# Patient Record
Sex: MALE | Race: WHITE | Employment: OTHER | URBAN - METROPOLITAN AREA
[De-identification: names, ages, dates, MRNs, and addresses within clinical notes are randomized per-mention and may not be internally consistent; named-entity substitution may affect disease eponyms.]

---

## 2017-05-16 PROBLEM — G93.41 ACUTE METABOLIC ENCEPHALOPATHY: Status: ACTIVE | Noted: 2017-05-16

## 2017-08-18 ENCOUNTER — HOSPITAL ENCOUNTER (OUTPATIENT)
Dept: ULTRASOUND IMAGING | Age: 77
Discharge: HOME | End: 2017-08-18

## 2017-08-18 DIAGNOSIS — N18.6 TYPE 2 DIABETES MELLITUS WITH ESRD (END-STAGE RENAL DISEASE) (HCC): ICD-10-CM

## 2017-08-18 DIAGNOSIS — E11.22 TYPE 2 DIABETES MELLITUS WITH ESRD (END-STAGE RENAL DISEASE) (HCC): ICD-10-CM

## 2017-08-18 DIAGNOSIS — D64.9 ANEMIA, UNSPECIFIED: ICD-10-CM

## 2017-08-18 DIAGNOSIS — N18.30 CHRONIC KIDNEY DISEASE, STAGE III (MODERATE) (HCC): ICD-10-CM

## 2017-08-18 DIAGNOSIS — I10 UNSPECIFIED ESSENTIAL HYPERTENSION: ICD-10-CM

## 2017-08-18 LAB
ALBUMIN SERPL-MCNC: 4.5 GM/DL (ref 3.4–5)
ANION GAP SERPL CALCULATED.3IONS-SCNC: 13 MMOL/L (ref 4–16)
BACTERIA: NEGATIVE /HPF
BASOPHILS ABSOLUTE: 0.1 K/CU MM
BASOPHILS RELATIVE PERCENT: 0.9 % (ref 0–1)
BILIRUBIN URINE: NEGATIVE MG/DL
BLOOD, URINE: ABNORMAL
BUN BLDV-MCNC: 30 MG/DL (ref 6–23)
CALCIUM SERPL-MCNC: 8.8 MG/DL (ref 8.3–10.6)
CHLORIDE BLD-SCNC: 103 MMOL/L (ref 99–110)
CLARITY: CLEAR
CO2: 25 MMOL/L (ref 21–32)
COLOR: ABNORMAL
CREAT SERPL-MCNC: 1.8 MG/DL (ref 0.9–1.3)
CREATININE URINE: 96.5 MG/DL (ref 39–259)
DIFFERENTIAL TYPE: ABNORMAL
EOSINOPHILS ABSOLUTE: 0.3 K/CU MM
EOSINOPHILS RELATIVE PERCENT: 3.9 % (ref 0–3)
GFR AFRICAN AMERICAN: 45 ML/MIN/1.73M2
GFR NON-AFRICAN AMERICAN: 37 ML/MIN/1.73M2
GLUCOSE BLD-MCNC: 108 MG/DL (ref 70–140)
GLUCOSE, URINE: NEGATIVE MG/DL
HCT VFR BLD CALC: 28.3 % (ref 42–52)
HEMOGLOBIN: 9.1 GM/DL (ref 13.5–18)
IMMATURE NEUTROPHIL %: 0.4 % (ref 0–0.43)
KETONES, URINE: NEGATIVE MG/DL
LEUKOCYTE ESTERASE, URINE: NEGATIVE
LYMPHOCYTES ABSOLUTE: 2.3 K/CU MM
LYMPHOCYTES RELATIVE PERCENT: 29.6 % (ref 24–44)
MCH RBC QN AUTO: 29.1 PG (ref 27–31)
MCHC RBC AUTO-ENTMCNC: 32.2 % (ref 32–36)
MCV RBC AUTO: 90.4 FL (ref 78–100)
MONOCYTES ABSOLUTE: 0.5 K/CU MM
MONOCYTES RELATIVE PERCENT: 6.8 % (ref 0–4)
MUCUS: ABNORMAL HPF
NITRITE URINE, QUANTITATIVE: NEGATIVE
NUCLEATED RBC %: 0 %
PDW BLD-RTO: 13.8 % (ref 11.7–14.9)
PH, URINE: 5 (ref 5–8)
PHOSPHORUS: 3.1 MG/DL (ref 2.5–4.9)
PLATELET # BLD: 209 K/CU MM (ref 140–440)
PMV BLD AUTO: 11.3 FL (ref 7.5–11.1)
POTASSIUM SERPL-SCNC: 5.1 MMOL/L (ref 3.5–5.1)
PROT/CREAT RATIO, UR: 0.2
PROTEIN UA: NEGATIVE MG/DL
RBC # BLD: 3.13 M/CU MM (ref 4.6–6.2)
RBC URINE: 1 /HPF (ref 0–3)
SEGMENTED NEUTROPHILS ABSOLUTE COUNT: 4.5 K/CU MM
SEGMENTED NEUTROPHILS RELATIVE PERCENT: 58.4 % (ref 36–66)
SODIUM BLD-SCNC: 141 MMOL/L (ref 135–145)
SPECIFIC GRAVITY UA: 1.01 (ref 1–1.03)
TOTAL IMMATURE NEUTOROPHIL: 0.03 K/CU MM
TOTAL NUCLEATED RBC: 0 K/CU MM
TRICHOMONAS: ABNORMAL /HPF
URINE TOTAL PROTEIN: 16.5 MG/DL
UROBILINOGEN, URINE: NORMAL MG/DL (ref 0.2–1)
VITAMIN D 25-HYDROXY: 14.51 NG/ML
WBC # BLD: 7.7 K/CU MM (ref 4–10.5)
WBC UA: <1 /HPF (ref 0–2)

## 2017-08-19 LAB — PARATHYROID HORMONE INTACT: 51

## 2017-11-25 PROBLEM — N17.9 AKI (ACUTE KIDNEY INJURY) (HCC): Status: ACTIVE | Noted: 2017-11-25

## 2018-04-05 ENCOUNTER — HOSPITAL ENCOUNTER (OUTPATIENT)
Dept: OTHER | Age: 78
Discharge: HOME | End: 2018-04-05
Attending: INTERNAL MEDICINE | Admitting: INTERNAL MEDICINE

## 2018-04-05 LAB
ALBUMIN SERPL-MCNC: 4.1 GM/DL (ref 3.4–5)
ALP BLD-CCNC: 80 IU/L (ref 40–129)
ALT SERPL-CCNC: 12 U/L (ref 10–40)
ANION GAP SERPL CALCULATED.3IONS-SCNC: 8 MMOL/L (ref 4–16)
APTT: 35.5 SECONDS (ref 21.2–33)
AST SERPL-CCNC: 17 IU/L (ref 15–37)
BILIRUB SERPL-MCNC: 0.3 MG/DL (ref 0–1)
BUN BLDV-MCNC: 28 MG/DL (ref 6–23)
CALCIUM SERPL-MCNC: 8.7 MG/DL (ref 8.3–10.6)
CHLORIDE BLD-SCNC: 102 MMOL/L (ref 99–110)
CO2: 29 MMOL/L (ref 21–32)
CREAT SERPL-MCNC: 1.9 MG/DL (ref 0.9–1.3)
ERYTHROCYTE SEDIMENTATION RATE: 41 MM/HR (ref 0–20)
FERRITIN: 66 NG/ML (ref 30–400)
FOLATE: >20 NG/ML (ref 3.1–17.5)
GFR AFRICAN AMERICAN: 42 ML/MIN/1.73M2
GFR NON-AFRICAN AMERICAN: 35 ML/MIN/1.73M2
GLUCOSE BLD-MCNC: 189 MG/DL (ref 70–99)
INR BLD: 1.07 INDEX
LACTATE DEHYDROGENASE: 207 IU/L (ref 120–246)
POTASSIUM SERPL-SCNC: 5.9 MMOL/L (ref 3.5–5.1)
PROTHROMBIN TIME: 12.2 SECONDS (ref 9.12–12.5)
RETICULOCYTE COUNT PCT: 1.5 % (ref 0.2–2.2)
SODIUM BLD-SCNC: 139 MMOL/L (ref 135–145)
TOTAL PROTEIN: 6.9 GM/DL (ref 6.4–8.2)
TSH HIGH SENSITIVITY: 0.19 UIU/ML (ref 0.27–4.2)
VITAMIN B-12: 501.1 PG/ML (ref 211–911)

## 2018-04-06 LAB
ALBUMIN ELP: 3.7 GM/DL (ref 3.2–5.6)
ALPHA-1-GLOBULIN: 0.3 GM/DL (ref 0.1–0.4)
ALPHA-2-GLOBULIN: 0.8 GM/DL (ref 0.4–1.2)
BETA GLOBULIN: 0.9 GM/DL (ref 0.5–1.3)
GAMMA GLOBULIN: 1.2 GM/DL (ref 0.5–1.6)
HAPTOGLOBIN: 194
TOTAL PROTEIN: 6.9 GM/DL (ref 6.4–8.2)

## 2018-06-01 PROBLEM — G93.40 ENCEPHALOPATHY ACUTE: Status: ACTIVE | Noted: 2018-06-01

## 2019-09-13 ENCOUNTER — OFFICE VISIT (OUTPATIENT)
Dept: ORTHOPEDIC SURGERY | Age: 79
End: 2019-09-13
Payer: MEDICARE

## 2019-09-13 VITALS — WEIGHT: 184 LBS | RESPIRATION RATE: 16 BRPM | HEIGHT: 67 IN | BODY MASS INDEX: 28.88 KG/M2

## 2019-09-13 DIAGNOSIS — S62.320A DISPLACED FRACTURE OF SHAFT OF SECOND METACARPAL BONE, RIGHT HAND, INITIAL ENCOUNTER FOR CLOSED FRACTURE: Primary | ICD-10-CM

## 2019-09-13 PROCEDURE — 1123F ACP DISCUSS/DSCN MKR DOCD: CPT | Performed by: PHYSICIAN ASSISTANT

## 2019-09-13 PROCEDURE — 4040F PNEUMOC VAC/ADMIN/RCVD: CPT | Performed by: PHYSICIAN ASSISTANT

## 2019-09-13 PROCEDURE — 26600 TREAT METACARPAL FRACTURE: CPT | Performed by: PHYSICIAN ASSISTANT

## 2019-09-13 PROCEDURE — 1036F TOBACCO NON-USER: CPT | Performed by: PHYSICIAN ASSISTANT

## 2019-09-13 PROCEDURE — G8427 DOCREV CUR MEDS BY ELIG CLIN: HCPCS | Performed by: PHYSICIAN ASSISTANT

## 2019-09-13 PROCEDURE — 99202 OFFICE O/P NEW SF 15 MIN: CPT | Performed by: PHYSICIAN ASSISTANT

## 2019-09-13 PROCEDURE — G8419 CALC BMI OUT NRM PARAM NOF/U: HCPCS | Performed by: PHYSICIAN ASSISTANT

## 2019-09-13 RX ORDER — TRAZODONE HYDROCHLORIDE 50 MG/1
50 TABLET ORAL
COMMUNITY
Start: 2019-07-10 | End: 2020-07-09

## 2019-09-13 ASSESSMENT — ENCOUNTER SYMPTOMS
RESPIRATORY NEGATIVE: 1
GASTROINTESTINAL NEGATIVE: 1
EYES NEGATIVE: 1

## 2019-09-13 NOTE — PROGRESS NOTES
I reviewed and agree with the portions of the HPI, review of systems, vital documentation and plan performed by my staff and have added/addended where appropriate. Review of Systems   Constitutional: Negative. HENT: Negative. Eyes: Negative. Respiratory: Negative. Cardiovascular: Negative. Gastrointestinal: Negative. Genitourinary: Negative. Musculoskeletal: Positive for arthralgias, gait problem, joint swelling and myalgias. Skin: Negative. Neurological: Positive for speech difficulty. Psychiatric/Behavioral: Positive for behavioral problems, confusion and dysphoric mood. The patient is nervous/anxious. Michael Murphy is a 70y.o. year old male who complains of Right hand pain and swelling. Patient was sent here by [facility] for possible hand fracture. He is a poor historian but states he had a fall on 9/9/19. He rates his pain a 7/10. And states there is no known prior history. Patient has severe anxiety. He is right-hand dominant.         Past Medical History:   Diagnosis Date    Depression     Diabetes ([address]     Dystonia per pt    GERD (gastroesophageal reflux disease)     Hyperlipidemia     Hypertension     Hypothyroidism     Neuropathy     Non-smoker per pt       Past Surgical History:   Procedure Laterality Date    ANKLE SURGERY Left 06/25/2016    [facility] BACK SURGERY         Family History   Problem Relation Age of Onset    Diabetes Father     High Cholesterol Father     Heart Disease Maternal Grandmother     Heart Disease Maternal Grandfather     Stroke Maternal Grandfather     Heart Disease Paternal Grandmother     Diabetes Paternal Grandfather     Heart Disease Paternal Grandfather     High Blood Pressure Paternal Grandfather        Social History     Socioeconomic History    Marital status:      Spouse name: None    Number of children: None    Years of education: None    Highest education level: None Occupational History    None   Social Needs    Financial resource strain: None    Food insecurity:     Worry: None     Inability: None    Transportation needs:     Medical: None     Non-medical: None   Tobacco Use    Smoking status: Never Smoker    Smokeless tobacco: Never Used   Substance and Sexual Activity    Alcohol use: No     Alcohol/week: 0.0 standard drinks    Drug use: No    Sexual activity: None   Lifestyle    Physical activity:     Days per week: None     Minutes per session: None    Stress: None   Relationships    Social connections:     Talks on phone: None     Gets together: None     Attends [employer] service: None     Active member of club or organization: None     Attends meetings of clubs or organizations: None     Relationship status: None    Intimate partner violence:     Fear of current or ex partner: None     Emotionally abused: None     Physically abused: None     Forced sexual activity: None   Other Topics Concern    None   Social History Narrative    None       Current Outpatient Medications   Medication Sig Dispense Refill    traZODone (DESYREL) 50 MG tablet Take 50 mg by mouth      donepezil (ARICEPT) 10 MG tablet Take 10 mg by mouth nightly      gabapentin (NEURONTIN) 300 MG capsule Take 300 mg by mouth 2 times daily. Suzette Becker escitalopram (LEXAPRO) 20 MG tablet Take 20 mg by mouth daily      morphine (MS CONTIN) 15 MG extended release tablet Take 15 mg by mouth 2 times daily. Suzette Becker linagliptin (TRADJENTA) 5 MG tablet Take 1 tablet by mouth daily 30 tablet 0    QUEtiapine (SEROQUEL) 50 MG tablet Take 1 tablet by mouth nightly 60 tablet 3    loperamide (IMODIUM) 2 MG capsule Take 2 mg by mouth as needed for Diarrhea      aspirin 81 MG chewable tablet Take 1 tablet by mouth daily 30 tablet 0    busPIRone (BUSPAR) 5 MG tablet Take 1 tablet by mouth 2 times daily 30 tablet 0    gabapentin (NEURONTIN) 400 MG capsule Take 2 capsules by mouth 3 times daily (with meals)

## 2019-09-23 ENCOUNTER — OFFICE VISIT (OUTPATIENT)
Dept: ORTHOPEDIC SURGERY | Age: 79
End: 2019-09-23

## 2019-09-23 VITALS
BODY MASS INDEX: 28.91 KG/M2 | RESPIRATION RATE: 14 BRPM | WEIGHT: 184.2 LBS | HEART RATE: 78 BPM | OXYGEN SATURATION: 99 % | HEIGHT: 67 IN

## 2019-09-23 DIAGNOSIS — S62.320A DISPLACED FRACTURE OF SHAFT OF SECOND METACARPAL BONE, RIGHT HAND, INITIAL ENCOUNTER FOR CLOSED FRACTURE: Primary | ICD-10-CM

## 2019-09-23 PROCEDURE — 99024 POSTOP FOLLOW-UP VISIT: CPT | Performed by: PHYSICIAN ASSISTANT

## 2019-09-23 ASSESSMENT — ENCOUNTER SYMPTOMS
EYES NEGATIVE: 1
RESPIRATORY NEGATIVE: 1
GASTROINTESTINAL NEGATIVE: 1

## 2019-09-23 NOTE — PROGRESS NOTES
Hold Lantus Insulin if H. S.blood glucose < 150  and call Dr. Baez Doing 1 vial 0    levothyroxine (SYNTHROID) 100 MCG tablet Take 1 tablet by mouth Daily 30 tablet 0    memantine (NAMENDA) 5 MG tablet Take 1 tablet by mouth 2 times daily 60 tablet 0    melatonin 3 MG TABS tablet Take 1 tablet by mouth nightly as needed (sleep) 30 tablet 0    Multiple Vitamin (MULTI-VITAMINS PO) Take 2 tablets by mouth daily Pt takes 2 tablets of Orchard Blend fruit and 2 tablets of Orchard vegetable a day      omeprazole (PRILOSEC) 20 MG capsule Take 20 mg by mouth daily      albuterol sulfate  (90 BASE) MCG/ACT inhaler Inhale 2 puffs into the lungs every 6 hours as needed for Wheezing      insulin lispro (HUMALOG) 100 UNIT/ML injection vial Inject 0-12 Units into the skin 3 times daily (with meals) 1 vial 3    cetirizine (ZYRTEC) 10 MG tablet Take 1 tablet by mouth daily       No current facility-administered medications for this visit. Allergies   Allergen Reactions    Amitriptyline Other (See Comments)     sedation  Other reaction(s): sedation  sedation    Codeine      States have used this with no issues  Other reaction(s): \"crazy\"  States have used this with no issues    Suprax [Cefixime] Other (See Comments)     Pain       Review of Systems:  See above      Physical Exam:   Ht 5' 6.75\" (1.695 m)   BMI 29.03 kg/m²        Gait is Abnormal.   Gen/Psych:Examination reveals a pleasant individual in no acute distress. The patient is oriented to time, place and person. The patient's mood and affect are appropriate.     Lymph: The lymphatic examination bilaterally reveals all areas to be without enlargement or induration.      Skin intact without lymphadenopathy, discoloration, or abnormal temperature.      Vascular: There is intact, symmetric circulation in both upper extremities.       Right Arm exam:  Sensation is subjectively and objectively {gen normal-decreased-absent ed:[id number]} in the

## 2019-09-30 ENCOUNTER — OFFICE VISIT (OUTPATIENT)
Dept: ORTHOPEDIC SURGERY | Age: 79
End: 2019-09-30

## 2019-09-30 VITALS — OXYGEN SATURATION: 98 % | TEMPERATURE: 98 F | HEART RATE: 80 BPM

## 2019-09-30 DIAGNOSIS — S62.320A DISPLACED FRACTURE OF SHAFT OF SECOND METACARPAL BONE, RIGHT HAND, INITIAL ENCOUNTER FOR CLOSED FRACTURE: Primary | ICD-10-CM

## 2019-09-30 PROCEDURE — 99024 POSTOP FOLLOW-UP VISIT: CPT | Performed by: ORTHOPAEDIC SURGERY

## 2019-09-30 RX ORDER — ACETAMINOPHEN 500 MG
500 TABLET ORAL 4 TIMES DAILY PRN
Qty: 60 TABLET | Refills: 0 | Status: ON HOLD | OUTPATIENT
Start: 2019-09-30 | End: 2020-05-11 | Stop reason: HOSPADM

## 2019-09-30 RX ORDER — HYDROCODONE BITARTRATE AND ACETAMINOPHEN 5; 325 MG/1; MG/1
1 TABLET ORAL EVERY 6 HOURS PRN
Qty: 15 TABLET | Refills: 0 | Status: SHIPPED | OUTPATIENT
Start: 2019-09-30 | End: 2019-10-07

## 2019-09-30 RX ORDER — CLINDAMYCIN HYDROCHLORIDE 150 MG/1
150 CAPSULE ORAL 4 TIMES DAILY
Qty: 4 CAPSULE | Refills: 0 | Status: SHIPPED | OUTPATIENT
Start: 2019-09-30 | End: 2019-10-01

## 2019-09-30 ASSESSMENT — ENCOUNTER SYMPTOMS
SHORTNESS OF BREATH: 0
COLOR CHANGE: 0

## 2019-09-30 NOTE — PROGRESS NOTES
Subjective:      Patient ID: Pacheco Headley is a 70 y.o. male. Patient here today to discuss possible surgery on R hand, referred by SORAIDA Paz for consult. Right hand cast is cutting into skin and patient would like that to be removed. Patient states due to the Right hand injury the L hand is now swelling and pains him to use.      Luis Huang MA
tenderness. He exhibits no deformity. Right elbow: Normal.       Left elbow: Normal.        Right wrist: He exhibits normal range of motion, no tenderness, no bony tenderness, no swelling, no effusion, no crepitus, no deformity and no laceration. Left wrist: He exhibits normal range of motion, no tenderness, no bony tenderness, no swelling, no effusion, no crepitus, no deformity and no laceration. Right hand: He exhibits decreased range of motion, tenderness, bony tenderness and swelling. He exhibits normal capillary refill, no deformity and no laceration. Normal sensation noted. Decreased strength noted. Left hand: He exhibits tenderness and swelling. He exhibits normal range of motion and no bony tenderness. Normal sensation noted. Normal strength noted. Neurological: He is alert and oriented to person, place, and time. Skin: Skin is warm and dry. Capillary refill takes less than 2 seconds. No rash noted. No erythema. No pallor. Right hand-Skin intact with no erythema, ecchymosis or lacerations present. moderate tenderness to palpation over the second metacarpal  Capillary refill less than 2 seconds to all fingers. Compartments soft. Range of motion not assessed due to recent injury. XRAY  X-ray 3 views of the right hand from September 23, 2019 reviewed by me today in the office demonstrates age appropriate bone density throughout with a long oblique fracture through the second metacarpal shaft with mild shortening and moderate dorsal displacement of the distal  fracture fragment, no subluxation or dislocation noted.     Xr Hand Left (min 3 Views)    Result Date: 9/30/2019  XRAY X-ray 3 views of the left hand obtained and reviewed by me today in the office demonstrates age appropriate bone density throughout with normal joint spaces and normal overall alignment, no subluxation or dislocation noted, mild degenerative changes, no acute osseous abnormalities, no bony

## 2019-10-01 ENCOUNTER — TELEPHONE (OUTPATIENT)
Dept: ORTHOPEDIC SURGERY | Age: 79
End: 2019-10-01

## 2019-10-02 ENCOUNTER — ANESTHESIA EVENT (OUTPATIENT)
Dept: OPERATING ROOM | Age: 79
End: 2019-10-02
Payer: MEDICARE

## 2019-10-03 RX ORDER — MIDODRINE HYDROCHLORIDE 5 MG/1
5 TABLET ORAL 2 TIMES DAILY
Status: ON HOLD | COMMUNITY
End: 2020-05-11 | Stop reason: HOSPADM

## 2019-10-03 RX ORDER — SENNA PLUS 8.6 MG/1
2 TABLET ORAL DAILY
Status: ON HOLD | COMMUNITY
End: 2022-01-26 | Stop reason: HOSPADM

## 2019-10-03 RX ORDER — ATORVASTATIN CALCIUM 80 MG/1
80 TABLET, FILM COATED ORAL DAILY
Status: ON HOLD | COMMUNITY
End: 2022-01-26 | Stop reason: HOSPADM

## 2019-10-04 ENCOUNTER — ANESTHESIA (OUTPATIENT)
Dept: OPERATING ROOM | Age: 79
End: 2019-10-04
Payer: MEDICARE

## 2019-10-04 ENCOUNTER — HOSPITAL ENCOUNTER (OUTPATIENT)
Age: 79
Setting detail: OUTPATIENT SURGERY
Discharge: HOME | End: 2019-10-04
Attending: ORTHOPAEDIC SURGERY | Admitting: ORTHOPAEDIC SURGERY
Payer: MEDICARE

## 2019-10-04 ENCOUNTER — APPOINTMENT (OUTPATIENT)
Dept: GENERAL RADIOLOGY | Age: 79
End: 2019-10-04
Attending: ORTHOPAEDIC SURGERY
Payer: MEDICARE

## 2019-10-04 VITALS
TEMPERATURE: 98.6 F | OXYGEN SATURATION: 100 % | DIASTOLIC BLOOD PRESSURE: 68 MMHG | RESPIRATION RATE: 1 BRPM | SYSTOLIC BLOOD PRESSURE: 109 MMHG

## 2019-10-04 VITALS
TEMPERATURE: 96.9 F | OXYGEN SATURATION: 99 % | BODY MASS INDEX: 28.25 KG/M2 | DIASTOLIC BLOOD PRESSURE: 94 MMHG | RESPIRATION RATE: 16 BRPM | SYSTOLIC BLOOD PRESSURE: 193 MMHG | HEART RATE: 87 BPM | HEIGHT: 67 IN | WEIGHT: 180 LBS

## 2019-10-04 LAB
ANION GAP SERPL CALCULATED.3IONS-SCNC: 7 MMOL/L (ref 4–16)
BUN BLDV-MCNC: 28 MG/DL (ref 6–23)
CALCIUM SERPL-MCNC: 8.1 MG/DL (ref 8.3–10.6)
CHLORIDE BLD-SCNC: 106 MMOL/L (ref 99–110)
CO2: 27 MMOL/L (ref 21–32)
CREAT SERPL-MCNC: 2.1 MG/DL (ref 0.9–1.3)
GFR AFRICAN AMERICAN: 37 ML/MIN/1.73M2
GFR NON-AFRICAN AMERICAN: 31 ML/MIN/1.73M2
GLUCOSE BLD-MCNC: 115 MG/DL (ref 70–99)
GLUCOSE BLD-MCNC: 129 MG/DL (ref 70–99)
GLUCOSE BLD-MCNC: 145 MG/DL (ref 70–99)
HCT VFR BLD CALC: 29.5 % (ref 42–52)
HEMOGLOBIN: 9 GM/DL (ref 13.5–18)
MCH RBC QN AUTO: 29.4 PG (ref 27–31)
MCHC RBC AUTO-ENTMCNC: 30.5 % (ref 32–36)
MCV RBC AUTO: 96.4 FL (ref 78–100)
PDW BLD-RTO: 13.5 % (ref 11.7–14.9)
PLATELET # BLD: 154 K/CU MM (ref 140–440)
PMV BLD AUTO: 10.6 FL (ref 7.5–11.1)
POTASSIUM SERPL-SCNC: 5 MMOL/L (ref 3.5–5.1)
RBC # BLD: 3.06 M/CU MM (ref 4.6–6.2)
SODIUM BLD-SCNC: 140 MMOL/L (ref 135–145)
WBC # BLD: 4.2 K/CU MM (ref 4–10.5)

## 2019-10-04 PROCEDURE — 82962 GLUCOSE BLOOD TEST: CPT

## 2019-10-04 PROCEDURE — 7100000011 HC PHASE II RECOVERY - ADDTL 15 MIN: Performed by: ORTHOPAEDIC SURGERY

## 2019-10-04 PROCEDURE — 7100000001 HC PACU RECOVERY - ADDTL 15 MIN: Performed by: ORTHOPAEDIC SURGERY

## 2019-10-04 PROCEDURE — 6360000002 HC RX W HCPCS: Performed by: ORTHOPAEDIC SURGERY

## 2019-10-04 PROCEDURE — 6360000002 HC RX W HCPCS: Performed by: NURSE ANESTHETIST, CERTIFIED REGISTERED

## 2019-10-04 PROCEDURE — 2580000003 HC RX 258: Performed by: ANESTHESIOLOGY

## 2019-10-04 PROCEDURE — 3600000014 HC SURGERY LEVEL 4 ADDTL 15MIN: Performed by: ORTHOPAEDIC SURGERY

## 2019-10-04 PROCEDURE — 2500000003 HC RX 250 WO HCPCS: Performed by: NURSE ANESTHETIST, CERTIFIED REGISTERED

## 2019-10-04 PROCEDURE — 2720000010 HC SURG SUPPLY STERILE: Performed by: ORTHOPAEDIC SURGERY

## 2019-10-04 PROCEDURE — 7100000000 HC PACU RECOVERY - FIRST 15 MIN: Performed by: ORTHOPAEDIC SURGERY

## 2019-10-04 PROCEDURE — C1713 ANCHOR/SCREW BN/BN,TIS/BN: HCPCS | Performed by: ORTHOPAEDIC SURGERY

## 2019-10-04 PROCEDURE — 3700000001 HC ADD 15 MINUTES (ANESTHESIA): Performed by: ORTHOPAEDIC SURGERY

## 2019-10-04 PROCEDURE — 2500000003 HC RX 250 WO HCPCS: Performed by: ORTHOPAEDIC SURGERY

## 2019-10-04 PROCEDURE — 3600000004 HC SURGERY LEVEL 4 BASE: Performed by: ORTHOPAEDIC SURGERY

## 2019-10-04 PROCEDURE — 7100000010 HC PHASE II RECOVERY - FIRST 15 MIN: Performed by: ORTHOPAEDIC SURGERY

## 2019-10-04 PROCEDURE — 2709999900 HC NON-CHARGEABLE SUPPLY: Performed by: ORTHOPAEDIC SURGERY

## 2019-10-04 PROCEDURE — 76000 FLUOROSCOPY <1 HR PHYS/QHP: CPT

## 2019-10-04 PROCEDURE — 85027 COMPLETE CBC AUTOMATED: CPT

## 2019-10-04 PROCEDURE — 80048 BASIC METABOLIC PNL TOTAL CA: CPT

## 2019-10-04 PROCEDURE — 26615 TREAT METACARPAL FRACTURE: CPT | Performed by: ORTHOPAEDIC SURGERY

## 2019-10-04 PROCEDURE — 3700000000 HC ANESTHESIA ATTENDED CARE: Performed by: ORTHOPAEDIC SURGERY

## 2019-10-04 DEVICE — IMPLANTS FOR ORTHOPEDIC BONE FIXATION. ALSO CALLED A BONE SCREW.
Type: IMPLANTABLE DEVICE | Site: HAND | Status: FUNCTIONAL
Brand: VARIABLE ANGLE NON-LOCKING SCREW

## 2019-10-04 DEVICE — IMPLANTS FOR ORTHOPEDIC BONE FIXATION.
Type: IMPLANTABLE DEVICE | Site: HAND | Status: FUNCTIONAL
Brand: VARIABLE ANGLE COMPRESSION SCREW

## 2019-10-04 DEVICE — IMPLANTS FOR ORTHOPEDIC BONE FIXATION
Type: IMPLANTABLE DEVICE | Status: FUNCTIONAL
Brand: 1.5MM H&F STRAIGHT PLATE, 8 HOLES, 1.5MM/2.0MM/2.4MM

## 2019-10-04 DEVICE — IMPLANTS FOR ORTHOPEDIC BONE FIXATION
Type: IMPLANTABLE DEVICE | Site: HAND | Status: FUNCTIONAL
Brand: VARIABLE ANGLE LOCKING SCREW

## 2019-10-04 RX ORDER — SODIUM CHLORIDE 9 MG/ML
INJECTION, SOLUTION INTRAVENOUS CONTINUOUS
Status: DISCONTINUED | OUTPATIENT
Start: 2019-10-04 | End: 2019-10-04 | Stop reason: HOSPADM

## 2019-10-04 RX ORDER — SODIUM CHLORIDE, SODIUM LACTATE, POTASSIUM CHLORIDE, CALCIUM CHLORIDE 600; 310; 30; 20 MG/100ML; MG/100ML; MG/100ML; MG/100ML
INJECTION, SOLUTION INTRAVENOUS CONTINUOUS
Status: DISCONTINUED | OUTPATIENT
Start: 2019-10-04 | End: 2019-10-04 | Stop reason: HOSPADM

## 2019-10-04 RX ORDER — FENTANYL CITRATE 50 UG/ML
INJECTION, SOLUTION INTRAMUSCULAR; INTRAVENOUS PRN
Status: DISCONTINUED | OUTPATIENT
Start: 2019-10-04 | End: 2019-10-04 | Stop reason: SDUPTHER

## 2019-10-04 RX ORDER — ONDANSETRON 2 MG/ML
INJECTION INTRAMUSCULAR; INTRAVENOUS PRN
Status: DISCONTINUED | OUTPATIENT
Start: 2019-10-04 | End: 2019-10-04 | Stop reason: SDUPTHER

## 2019-10-04 RX ORDER — ACETAMINOPHEN 160 MG
TABLET,DISINTEGRATING ORAL DAILY
Status: ON HOLD | COMMUNITY
End: 2022-01-26 | Stop reason: HOSPADM

## 2019-10-04 RX ORDER — OXYCODONE HYDROCHLORIDE 5 MG/1
10 TABLET ORAL EVERY 4 HOURS PRN
Status: DISCONTINUED | OUTPATIENT
Start: 2019-10-04 | End: 2019-10-04 | Stop reason: HOSPADM

## 2019-10-04 RX ORDER — SODIUM CHLORIDE 0.9 % (FLUSH) 0.9 %
10 SYRINGE (ML) INJECTION EVERY 12 HOURS SCHEDULED
Status: DISCONTINUED | OUTPATIENT
Start: 2019-10-04 | End: 2019-10-04 | Stop reason: HOSPADM

## 2019-10-04 RX ORDER — BUPIVACAINE HYDROCHLORIDE 2.5 MG/ML
INJECTION, SOLUTION EPIDURAL; INFILTRATION; INTRACAUDAL
Status: COMPLETED | OUTPATIENT
Start: 2019-10-04 | End: 2019-10-04

## 2019-10-04 RX ORDER — KETOROLAC TROMETHAMINE 4 MG/ML
1 SOLUTION/ DROPS OPHTHALMIC 4 TIMES DAILY
COMMUNITY
End: 2021-06-09

## 2019-10-04 RX ORDER — DOCUSATE SODIUM 100 MG/1
100 CAPSULE, LIQUID FILLED ORAL 2 TIMES DAILY
Status: DISCONTINUED | OUTPATIENT
Start: 2019-10-04 | End: 2019-10-04 | Stop reason: HOSPADM

## 2019-10-04 RX ORDER — OFLOXACIN 3 MG/ML
1 SOLUTION/ DROPS OPHTHALMIC 4 TIMES DAILY
COMMUNITY
End: 2021-06-09

## 2019-10-04 RX ORDER — GLYCOPYRROLATE 1 MG/5 ML
SYRINGE (ML) INTRAVENOUS PRN
Status: DISCONTINUED | OUTPATIENT
Start: 2019-10-04 | End: 2019-10-04 | Stop reason: SDUPTHER

## 2019-10-04 RX ORDER — SODIUM CHLORIDE 0.9 % (FLUSH) 0.9 %
10 SYRINGE (ML) INJECTION PRN
Status: DISCONTINUED | OUTPATIENT
Start: 2019-10-04 | End: 2019-10-04 | Stop reason: HOSPADM

## 2019-10-04 RX ORDER — CEFAZOLIN SODIUM 2 G/100ML
2 INJECTION, SOLUTION INTRAVENOUS ONCE
Status: COMPLETED | OUTPATIENT
Start: 2019-10-04 | End: 2019-10-04

## 2019-10-04 RX ORDER — EPHEDRINE SULFATE 50 MG/ML
INJECTION, SOLUTION INTRAVENOUS PRN
Status: DISCONTINUED | OUTPATIENT
Start: 2019-10-04 | End: 2019-10-04 | Stop reason: SDUPTHER

## 2019-10-04 RX ORDER — ONDANSETRON 2 MG/ML
4 INJECTION INTRAMUSCULAR; INTRAVENOUS EVERY 6 HOURS PRN
Status: DISCONTINUED | OUTPATIENT
Start: 2019-10-04 | End: 2019-10-04 | Stop reason: HOSPADM

## 2019-10-04 RX ORDER — LIDOCAINE HYDROCHLORIDE 20 MG/ML
INJECTION, SOLUTION EPIDURAL; INFILTRATION; INTRACAUDAL; PERINEURAL PRN
Status: DISCONTINUED | OUTPATIENT
Start: 2019-10-04 | End: 2019-10-04 | Stop reason: SDUPTHER

## 2019-10-04 RX ORDER — FENTANYL CITRATE 50 UG/ML
25 INJECTION, SOLUTION INTRAMUSCULAR; INTRAVENOUS EVERY 5 MIN PRN
Status: DISCONTINUED | OUTPATIENT
Start: 2019-10-04 | End: 2019-10-04 | Stop reason: HOSPADM

## 2019-10-04 RX ORDER — FENTANYL CITRATE 50 UG/ML
50 INJECTION, SOLUTION INTRAMUSCULAR; INTRAVENOUS EVERY 5 MIN PRN
Status: DISCONTINUED | OUTPATIENT
Start: 2019-10-04 | End: 2019-10-04 | Stop reason: HOSPADM

## 2019-10-04 RX ORDER — DEXAMETHASONE SODIUM PHOSPHATE 4 MG/ML
INJECTION, SOLUTION INTRA-ARTICULAR; INTRALESIONAL; INTRAMUSCULAR; INTRAVENOUS; SOFT TISSUE PRN
Status: DISCONTINUED | OUTPATIENT
Start: 2019-10-04 | End: 2019-10-04 | Stop reason: SDUPTHER

## 2019-10-04 RX ORDER — SODIUM CHLORIDE 9 MG/ML
INJECTION, SOLUTION INTRAVENOUS
Status: COMPLETED
Start: 2019-10-04 | End: 2019-10-04

## 2019-10-04 RX ORDER — DIPHENHYDRAMINE HCL 25 MG
25 TABLET ORAL NIGHTLY
Status: ON HOLD | COMMUNITY
End: 2020-05-11 | Stop reason: HOSPADM

## 2019-10-04 RX ORDER — PROPOFOL 10 MG/ML
INJECTION, EMULSION INTRAVENOUS PRN
Status: DISCONTINUED | OUTPATIENT
Start: 2019-10-04 | End: 2019-10-04 | Stop reason: SDUPTHER

## 2019-10-04 RX ORDER — OXYCODONE HYDROCHLORIDE 5 MG/1
5 TABLET ORAL EVERY 4 HOURS PRN
Status: DISCONTINUED | OUTPATIENT
Start: 2019-10-04 | End: 2019-10-04 | Stop reason: HOSPADM

## 2019-10-04 RX ORDER — PREDNISOLONE ACETATE 10 MG/ML
1 SUSPENSION/ DROPS OPHTHALMIC 4 TIMES DAILY
COMMUNITY
End: 2021-06-09

## 2019-10-04 RX ADMIN — Medication 0.2 MG: at 12:48

## 2019-10-04 RX ADMIN — DEXAMETHASONE SODIUM PHOSPHATE 8 MG: 4 INJECTION, SOLUTION INTRAMUSCULAR; INTRAVENOUS at 12:46

## 2019-10-04 RX ADMIN — FENTANYL CITRATE 50 MCG: 50 INJECTION INTRAMUSCULAR; INTRAVENOUS at 12:40

## 2019-10-04 RX ADMIN — Medication 0.2 MG: at 12:43

## 2019-10-04 RX ADMIN — PROPOFOL 150 MG: 10 INJECTION, EMULSION INTRAVENOUS at 12:40

## 2019-10-04 RX ADMIN — CEFAZOLIN SODIUM 2 G: 2 INJECTION, SOLUTION INTRAVENOUS at 12:43

## 2019-10-04 RX ADMIN — FENTANYL CITRATE 25 MCG: 50 INJECTION INTRAMUSCULAR; INTRAVENOUS at 13:05

## 2019-10-04 RX ADMIN — ONDANSETRON 4 MG: 2 INJECTION INTRAMUSCULAR; INTRAVENOUS at 13:21

## 2019-10-04 RX ADMIN — LIDOCAINE HYDROCHLORIDE 100 MG: 20 INJECTION, SOLUTION EPIDURAL; INFILTRATION; INTRACAUDAL; PERINEURAL at 12:40

## 2019-10-04 RX ADMIN — EPHEDRINE SULFATE 10 MG: 50 INJECTION, SOLUTION INTRAMUSCULAR; INTRAVENOUS; SUBCUTANEOUS at 12:47

## 2019-10-04 RX ADMIN — SODIUM CHLORIDE: 9 INJECTION, SOLUTION INTRAVENOUS at 12:37

## 2019-10-04 RX ADMIN — FENTANYL CITRATE 25 MCG: 50 INJECTION INTRAMUSCULAR; INTRAVENOUS at 12:51

## 2019-10-04 ASSESSMENT — PULMONARY FUNCTION TESTS
PIF_VALUE: 6
PIF_VALUE: 18
PIF_VALUE: 12
PIF_VALUE: 10
PIF_VALUE: 8
PIF_VALUE: 6
PIF_VALUE: 12
PIF_VALUE: 6
PIF_VALUE: 6
PIF_VALUE: 9
PIF_VALUE: 5
PIF_VALUE: 6
PIF_VALUE: 2
PIF_VALUE: 9
PIF_VALUE: 3
PIF_VALUE: 8
PIF_VALUE: 12
PIF_VALUE: 10
PIF_VALUE: 6
PIF_VALUE: 1
PIF_VALUE: 6
PIF_VALUE: 9
PIF_VALUE: 1
PIF_VALUE: 6
PIF_VALUE: 8
PIF_VALUE: 12
PIF_VALUE: 10
PIF_VALUE: 10
PIF_VALUE: 7
PIF_VALUE: 1
PIF_VALUE: 1
PIF_VALUE: 10
PIF_VALUE: 6
PIF_VALUE: 10
PIF_VALUE: 10
PIF_VALUE: 6
PIF_VALUE: 12
PIF_VALUE: 9
PIF_VALUE: 7
PIF_VALUE: 6
PIF_VALUE: 11
PIF_VALUE: 9
PIF_VALUE: 6
PIF_VALUE: 8
PIF_VALUE: 6
PIF_VALUE: 12
PIF_VALUE: 8
PIF_VALUE: 6
PIF_VALUE: 1
PIF_VALUE: 11
PIF_VALUE: 6
PIF_VALUE: 7
PIF_VALUE: 7
PIF_VALUE: 2
PIF_VALUE: 7
PIF_VALUE: 0
PIF_VALUE: 7
PIF_VALUE: 9
PIF_VALUE: 7
PIF_VALUE: 5
PIF_VALUE: 8

## 2019-10-04 ASSESSMENT — PAIN DESCRIPTION - DESCRIPTORS: DESCRIPTORS: DISCOMFORT;SHARP

## 2019-10-04 ASSESSMENT — PAIN SCALES - GENERAL
PAINLEVEL_OUTOF10: 4
PAINLEVEL_OUTOF10: 0

## 2019-10-04 ASSESSMENT — PAIN - FUNCTIONAL ASSESSMENT: PAIN_FUNCTIONAL_ASSESSMENT: 0-10

## 2019-10-21 ENCOUNTER — OFFICE VISIT (OUTPATIENT)
Dept: ORTHOPEDIC SURGERY | Age: 79
End: 2019-10-21

## 2019-10-21 VITALS
RESPIRATION RATE: 15 BRPM | HEIGHT: 67 IN | BODY MASS INDEX: 28.24 KG/M2 | HEART RATE: 84 BPM | WEIGHT: 179.9 LBS | OXYGEN SATURATION: 94 %

## 2019-10-21 DIAGNOSIS — Z09 POSTOP CHECK: Primary | ICD-10-CM

## 2019-10-21 PROCEDURE — 99024 POSTOP FOLLOW-UP VISIT: CPT | Performed by: PHYSICIAN ASSISTANT

## 2019-11-20 PROBLEM — Z09 POSTOP CHECK: Status: RESOLVED | Noted: 2019-10-21 | Resolved: 2019-11-20

## 2019-11-22 ENCOUNTER — OFFICE VISIT (OUTPATIENT)
Dept: ORTHOPEDIC SURGERY | Age: 79
End: 2019-11-22

## 2019-11-22 VITALS
BODY MASS INDEX: 29.57 KG/M2 | HEIGHT: 67 IN | OXYGEN SATURATION: 98 % | HEART RATE: 78 BPM | WEIGHT: 188.4 LBS | RESPIRATION RATE: 14 BRPM

## 2019-11-22 DIAGNOSIS — Z98.890 S/P ORIF (OPEN REDUCTION INTERNAL FIXATION) FRACTURE: Primary | ICD-10-CM

## 2019-11-22 DIAGNOSIS — S62.320A DISPLACED FRACTURE OF SHAFT OF SECOND METACARPAL BONE, RIGHT HAND, INITIAL ENCOUNTER FOR CLOSED FRACTURE: ICD-10-CM

## 2019-11-22 DIAGNOSIS — Z87.81 S/P ORIF (OPEN REDUCTION INTERNAL FIXATION) FRACTURE: Primary | ICD-10-CM

## 2019-11-22 PROCEDURE — 99024 POSTOP FOLLOW-UP VISIT: CPT | Performed by: PHYSICIAN ASSISTANT

## 2020-05-01 ENCOUNTER — APPOINTMENT (OUTPATIENT)
Dept: CT IMAGING | Age: 80
DRG: 673 | End: 2020-05-01
Payer: MEDICARE

## 2020-05-01 ENCOUNTER — HOSPITAL ENCOUNTER (INPATIENT)
Age: 80
LOS: 10 days | Discharge: NURSING, REHAB OR LONG-TERM CARE | DRG: 673 | End: 2020-05-11
Attending: EMERGENCY MEDICINE | Admitting: INTERNAL MEDICINE
Payer: MEDICARE

## 2020-05-01 ENCOUNTER — APPOINTMENT (OUTPATIENT)
Dept: GENERAL RADIOLOGY | Age: 80
DRG: 673 | End: 2020-05-01
Payer: MEDICARE

## 2020-05-01 LAB
ALBUMIN SERPL-MCNC: 3.5 GM/DL (ref 3.4–5)
ALP BLD-CCNC: 648 IU/L (ref 40–129)
ALT SERPL-CCNC: 144 U/L (ref 10–40)
ANION GAP SERPL CALCULATED.3IONS-SCNC: 17 MMOL/L (ref 4–16)
AST SERPL-CCNC: 147 IU/L (ref 15–37)
BACTERIA: NEGATIVE /HPF
BASE EXCESS: 8 (ref 0–3.3)
BASOPHILS ABSOLUTE: 0 K/CU MM
BASOPHILS RELATIVE PERCENT: 0.4 % (ref 0–1)
BILIRUB SERPL-MCNC: 2.7 MG/DL (ref 0–1)
BILIRUBIN URINE: NEGATIVE MG/DL
BLOOD, URINE: ABNORMAL
BUN BLDV-MCNC: 83 MG/DL (ref 6–23)
CALCIUM SERPL-MCNC: 8 MG/DL (ref 8.3–10.6)
CHLORIDE BLD-SCNC: 90 MMOL/L (ref 99–110)
CLARITY: CLEAR
CO2: 18 MMOL/L (ref 21–32)
COLOR: ABNORMAL
COMMENT: ABNORMAL
CREAT SERPL-MCNC: 10.1 MG/DL (ref 0.9–1.3)
DIFFERENTIAL TYPE: ABNORMAL
EOSINOPHILS ABSOLUTE: 0.2 K/CU MM
EOSINOPHILS RELATIVE PERCENT: 2.1 % (ref 0–3)
GFR AFRICAN AMERICAN: 6 ML/MIN/1.73M2
GFR NON-AFRICAN AMERICAN: 5 ML/MIN/1.73M2
GLUCOSE BLD-MCNC: 124 MG/DL (ref 70–99)
GLUCOSE, URINE: 50 MG/DL
HCO3 VENOUS: 19.6 MMOL/L (ref 19–25)
HCT VFR BLD CALC: 25.9 % (ref 42–52)
HEMOGLOBIN: 8 GM/DL (ref 13.5–18)
IMMATURE NEUTROPHIL %: 0.4 % (ref 0–0.43)
KETONES, URINE: NEGATIVE MG/DL
LEUKOCYTE ESTERASE, URINE: NEGATIVE
LYMPHOCYTES ABSOLUTE: 0.9 K/CU MM
LYMPHOCYTES RELATIVE PERCENT: 11 % (ref 24–44)
MCH RBC QN AUTO: 28.4 PG (ref 27–31)
MCHC RBC AUTO-ENTMCNC: 30.9 % (ref 32–36)
MCV RBC AUTO: 91.8 FL (ref 78–100)
MONOCYTES ABSOLUTE: 0.6 K/CU MM
MONOCYTES RELATIVE PERCENT: 7.2 % (ref 0–4)
NITRITE URINE, QUANTITATIVE: NEGATIVE
NUCLEATED RBC %: 0 %
O2 SAT, VEN: 90.8 % (ref 50–70)
PCO2, VEN: 49 MMHG (ref 38–52)
PDW BLD-RTO: 13.7 % (ref 11.7–14.9)
PH VENOUS: 7.21 (ref 7.32–7.42)
PH, URINE: 5 (ref 5–8)
PLATELET # BLD: 122 K/CU MM (ref 140–440)
PMV BLD AUTO: 12.4 FL (ref 7.5–11.1)
PO2, VEN: 68 MMHG (ref 28–48)
POTASSIUM SERPL-SCNC: 4.5 MMOL/L (ref 3.5–5.1)
PRO-BNP: 2578 PG/ML
PROTEIN UA: 100 MG/DL
RBC # BLD: 2.82 M/CU MM (ref 4.6–6.2)
RBC URINE: 1 /HPF (ref 0–3)
SEGMENTED NEUTROPHILS ABSOLUTE COUNT: 6.6 K/CU MM
SEGMENTED NEUTROPHILS RELATIVE PERCENT: 78.9 % (ref 36–66)
SODIUM BLD-SCNC: 125 MMOL/L (ref 135–145)
SPECIFIC GRAVITY UA: 1.01 (ref 1–1.03)
TOTAL IMMATURE NEUTOROPHIL: 0.03 K/CU MM
TOTAL NUCLEATED RBC: 0 K/CU MM
TOTAL PROTEIN: 6.6 GM/DL (ref 6.4–8.2)
TRICHOMONAS: ABNORMAL /HPF
UROBILINOGEN, URINE: NORMAL MG/DL (ref 0.2–1)
WBC # BLD: 8.4 K/CU MM (ref 4–10.5)
WBC UA: <1 /HPF (ref 0–2)

## 2020-05-01 PROCEDURE — 70450 CT HEAD/BRAIN W/O DYE: CPT

## 2020-05-01 PROCEDURE — 99291 CRITICAL CARE FIRST HOUR: CPT

## 2020-05-01 PROCEDURE — 83880 ASSAY OF NATRIURETIC PEPTIDE: CPT

## 2020-05-01 PROCEDURE — 4500000027

## 2020-05-01 PROCEDURE — 80053 COMPREHEN METABOLIC PANEL: CPT

## 2020-05-01 PROCEDURE — 1200000000 HC SEMI PRIVATE

## 2020-05-01 PROCEDURE — 82805 BLOOD GASES W/O2 SATURATION: CPT

## 2020-05-01 PROCEDURE — 85025 COMPLETE CBC W/AUTO DIFF WBC: CPT

## 2020-05-01 PROCEDURE — 71045 X-RAY EXAM CHEST 1 VIEW: CPT

## 2020-05-01 PROCEDURE — 74176 CT ABD & PELVIS W/O CONTRAST: CPT

## 2020-05-01 PROCEDURE — 81001 URINALYSIS AUTO W/SCOPE: CPT

## 2020-05-02 ENCOUNTER — APPOINTMENT (OUTPATIENT)
Dept: ULTRASOUND IMAGING | Age: 80
DRG: 673 | End: 2020-05-02
Payer: MEDICARE

## 2020-05-02 ENCOUNTER — APPOINTMENT (OUTPATIENT)
Dept: GENERAL RADIOLOGY | Age: 80
DRG: 673 | End: 2020-05-02
Payer: MEDICARE

## 2020-05-02 ENCOUNTER — CLINICAL DOCUMENTATION (OUTPATIENT)
Dept: ULTRASOUND IMAGING | Age: 80
End: 2020-05-02

## 2020-05-02 ENCOUNTER — APPOINTMENT (OUTPATIENT)
Dept: INTERVENTIONAL RADIOLOGY/VASCULAR | Age: 80
DRG: 673 | End: 2020-05-02
Payer: MEDICARE

## 2020-05-02 LAB
ALBUMIN SERPL-MCNC: 3.6 GM/DL (ref 3.4–5)
ALP BLD-CCNC: 749 IU/L (ref 40–128)
ALT SERPL-CCNC: 143 U/L (ref 10–40)
ANION GAP SERPL CALCULATED.3IONS-SCNC: 21 MMOL/L (ref 4–16)
AST SERPL-CCNC: 157 IU/L (ref 15–37)
BILIRUB SERPL-MCNC: 2.6 MG/DL (ref 0–1)
BUN BLDV-MCNC: 83 MG/DL (ref 6–23)
CALCIUM SERPL-MCNC: 8 MG/DL (ref 8.3–10.6)
CHLORIDE BLD-SCNC: 94 MMOL/L (ref 99–110)
CO2: 18 MMOL/L (ref 21–32)
CREAT SERPL-MCNC: 10.9 MG/DL (ref 0.9–1.3)
ESTIMATED AVERAGE GLUCOSE: 166 MG/DL
GFR AFRICAN AMERICAN: 6 ML/MIN/1.73M2
GFR NON-AFRICAN AMERICAN: 5 ML/MIN/1.73M2
GLUCOSE BLD-MCNC: 114 MG/DL (ref 70–99)
GLUCOSE BLD-MCNC: 129 MG/DL (ref 70–99)
GLUCOSE BLD-MCNC: 150 MG/DL (ref 70–99)
GLUCOSE BLD-MCNC: 162 MG/DL (ref 70–99)
GLUCOSE BLD-MCNC: 63 MG/DL (ref 70–99)
GLUCOSE BLD-MCNC: 64 MG/DL (ref 70–99)
GLUCOSE BLD-MCNC: 88 MG/DL (ref 70–99)
GLUCOSE BLD-MCNC: 92 MG/DL (ref 70–99)
GLUCOSE BLD-MCNC: 99 MG/DL (ref 70–99)
HBA1C MFR BLD: 7.4 % (ref 4.2–6.3)
HBV SURFACE AB TITR SER: <3.5 {TITER}
HCT VFR BLD CALC: 26.6 % (ref 42–52)
HEMOGLOBIN: 8.2 GM/DL (ref 13.5–18)
HEPATITIS B SURFACE ANTIGEN: NON REACTIVE
MCH RBC QN AUTO: 28.5 PG (ref 27–31)
MCHC RBC AUTO-ENTMCNC: 30.8 % (ref 32–36)
MCV RBC AUTO: 92.4 FL (ref 78–100)
OSMOLALITY: 302 MOS/L (ref 280–300)
PDW BLD-RTO: 13.7 % (ref 11.7–14.9)
PLATELET # BLD: 143 K/CU MM (ref 140–440)
PMV BLD AUTO: 12.3 FL (ref 7.5–11.1)
POTASSIUM SERPL-SCNC: 4.1 MMOL/L (ref 3.5–5.1)
RBC # BLD: 2.88 M/CU MM (ref 4.6–6.2)
SODIUM BLD-SCNC: 133 MMOL/L (ref 135–145)
TOTAL CK: 3135 IU/L (ref 38–174)
TOTAL PROTEIN: 6.1 GM/DL (ref 6.4–8.2)
WBC # BLD: 8.4 K/CU MM (ref 4–10.5)

## 2020-05-02 PROCEDURE — 6370000000 HC RX 637 (ALT 250 FOR IP): Performed by: INTERNAL MEDICINE

## 2020-05-02 PROCEDURE — 86706 HEP B SURFACE ANTIBODY: CPT

## 2020-05-02 PROCEDURE — 6360000002 HC RX W HCPCS: Performed by: INTERNAL MEDICINE

## 2020-05-02 PROCEDURE — 5A1D70Z PERFORMANCE OF URINARY FILTRATION, INTERMITTENT, LESS THAN 6 HOURS PER DAY: ICD-10-PCS | Performed by: INTERNAL MEDICINE

## 2020-05-02 PROCEDURE — 90935 HEMODIALYSIS ONE EVALUATION: CPT

## 2020-05-02 PROCEDURE — 2580000003 HC RX 258: Performed by: HOSPITALIST

## 2020-05-02 PROCEDURE — 80053 COMPREHEN METABOLIC PANEL: CPT

## 2020-05-02 PROCEDURE — 36556 INSERT NON-TUNNEL CV CATH: CPT

## 2020-05-02 PROCEDURE — 82550 ASSAY OF CK (CPK): CPT

## 2020-05-02 PROCEDURE — 85027 COMPLETE CBC AUTOMATED: CPT

## 2020-05-02 PROCEDURE — 1200000000 HC SEMI PRIVATE

## 2020-05-02 PROCEDURE — 83036 HEMOGLOBIN GLYCOSYLATED A1C: CPT

## 2020-05-02 PROCEDURE — 76937 US GUIDE VASCULAR ACCESS: CPT

## 2020-05-02 PROCEDURE — B548ZZA ULTRASONOGRAPHY OF SUPERIOR VENA CAVA, GUIDANCE: ICD-10-PCS | Performed by: RADIOLOGY

## 2020-05-02 PROCEDURE — 2709999900 HC NON-CHARGEABLE SUPPLY

## 2020-05-02 PROCEDURE — 02HV33Z INSERTION OF INFUSION DEVICE INTO SUPERIOR VENA CAVA, PERCUTANEOUS APPROACH: ICD-10-PCS | Performed by: RADIOLOGY

## 2020-05-02 PROCEDURE — C1894 INTRO/SHEATH, NON-LASER: HCPCS

## 2020-05-02 PROCEDURE — 71045 X-RAY EXAM CHEST 1 VIEW: CPT

## 2020-05-02 PROCEDURE — C1752 CATH,HEMODIALYSIS,SHORT-TERM: HCPCS

## 2020-05-02 PROCEDURE — 83930 ASSAY OF BLOOD OSMOLALITY: CPT

## 2020-05-02 PROCEDURE — 87340 HEPATITIS B SURFACE AG IA: CPT

## 2020-05-02 PROCEDURE — 2580000003 HC RX 258: Performed by: INTERNAL MEDICINE

## 2020-05-02 PROCEDURE — 94761 N-INVAS EAR/PLS OXIMETRY MLT: CPT

## 2020-05-02 PROCEDURE — 82962 GLUCOSE BLOOD TEST: CPT

## 2020-05-02 RX ORDER — ACETAMINOPHEN 650 MG/1
650 SUPPOSITORY RECTAL EVERY 6 HOURS PRN
Status: DISCONTINUED | OUTPATIENT
Start: 2020-05-02 | End: 2020-05-11 | Stop reason: HOSPADM

## 2020-05-02 RX ORDER — SODIUM CHLORIDE 0.9 % (FLUSH) 0.9 %
10 SYRINGE (ML) INJECTION EVERY 12 HOURS SCHEDULED
Status: DISCONTINUED | OUTPATIENT
Start: 2020-05-02 | End: 2020-05-11 | Stop reason: HOSPADM

## 2020-05-02 RX ORDER — ONDANSETRON 2 MG/ML
4 INJECTION INTRAMUSCULAR; INTRAVENOUS EVERY 6 HOURS PRN
Status: DISCONTINUED | OUTPATIENT
Start: 2020-05-02 | End: 2020-05-11 | Stop reason: HOSPADM

## 2020-05-02 RX ORDER — ESCITALOPRAM OXALATE 10 MG/1
20 TABLET ORAL DAILY
Status: DISCONTINUED | OUTPATIENT
Start: 2020-05-02 | End: 2020-05-02

## 2020-05-02 RX ORDER — HEPARIN SODIUM 1000 [USP'U]/ML
2600 INJECTION, SOLUTION INTRAVENOUS; SUBCUTANEOUS
Status: COMPLETED | OUTPATIENT
Start: 2020-05-02 | End: 2020-05-02

## 2020-05-02 RX ORDER — ACETAMINOPHEN 325 MG/1
650 TABLET ORAL EVERY 6 HOURS PRN
Status: DISCONTINUED | OUTPATIENT
Start: 2020-05-02 | End: 2020-05-11 | Stop reason: HOSPADM

## 2020-05-02 RX ORDER — PROMETHAZINE HYDROCHLORIDE 12.5 MG/1
12.5 TABLET ORAL EVERY 6 HOURS PRN
Status: DISCONTINUED | OUTPATIENT
Start: 2020-05-02 | End: 2020-05-11 | Stop reason: HOSPADM

## 2020-05-02 RX ORDER — SODIUM CHLORIDE 0.9 % (FLUSH) 0.9 %
10 SYRINGE (ML) INJECTION PRN
Status: DISCONTINUED | OUTPATIENT
Start: 2020-05-02 | End: 2020-05-11 | Stop reason: HOSPADM

## 2020-05-02 RX ORDER — ASPIRIN 81 MG/1
81 TABLET, CHEWABLE ORAL DAILY
Status: DISCONTINUED | OUTPATIENT
Start: 2020-05-02 | End: 2020-05-11 | Stop reason: HOSPADM

## 2020-05-02 RX ORDER — ALBUTEROL SULFATE 2.5 MG/3ML
2.5 SOLUTION RESPIRATORY (INHALATION) EVERY 6 HOURS PRN
Status: DISCONTINUED | OUTPATIENT
Start: 2020-05-02 | End: 2020-05-11 | Stop reason: HOSPADM

## 2020-05-02 RX ORDER — NICOTINE POLACRILEX 4 MG
15 LOZENGE BUCCAL PRN
Status: DISCONTINUED | OUTPATIENT
Start: 2020-05-02 | End: 2020-05-11 | Stop reason: HOSPADM

## 2020-05-02 RX ORDER — ATORVASTATIN CALCIUM 40 MG/1
80 TABLET, FILM COATED ORAL DAILY
Status: DISCONTINUED | OUTPATIENT
Start: 2020-05-02 | End: 2020-05-11 | Stop reason: HOSPADM

## 2020-05-02 RX ORDER — ACETAMINOPHEN 500 MG
500 TABLET ORAL 4 TIMES DAILY PRN
Status: DISCONTINUED | OUTPATIENT
Start: 2020-05-02 | End: 2020-05-11 | Stop reason: HOSPADM

## 2020-05-02 RX ORDER — INSULIN GLARGINE 100 [IU]/ML
20 INJECTION, SOLUTION SUBCUTANEOUS NIGHTLY
Status: DISCONTINUED | OUTPATIENT
Start: 2020-05-02 | End: 2020-05-02

## 2020-05-02 RX ORDER — BUSPIRONE HYDROCHLORIDE 5 MG/1
5 TABLET ORAL 2 TIMES DAILY
Status: DISCONTINUED | OUTPATIENT
Start: 2020-05-02 | End: 2020-05-02

## 2020-05-02 RX ORDER — HEPARIN SODIUM 5000 [USP'U]/ML
5000 INJECTION, SOLUTION INTRAVENOUS; SUBCUTANEOUS EVERY 8 HOURS SCHEDULED
Status: DISCONTINUED | OUTPATIENT
Start: 2020-05-02 | End: 2020-05-11 | Stop reason: HOSPADM

## 2020-05-02 RX ORDER — ALBUTEROL SULFATE 90 UG/1
2 AEROSOL, METERED RESPIRATORY (INHALATION) EVERY 6 HOURS PRN
Status: DISCONTINUED | OUTPATIENT
Start: 2020-05-02 | End: 2020-05-02

## 2020-05-02 RX ORDER — DEXTROSE MONOHYDRATE 25 G/50ML
12.5 INJECTION, SOLUTION INTRAVENOUS PRN
Status: DISCONTINUED | OUTPATIENT
Start: 2020-05-02 | End: 2020-05-11 | Stop reason: HOSPADM

## 2020-05-02 RX ORDER — QUETIAPINE FUMARATE 25 MG/1
50 TABLET, FILM COATED ORAL NIGHTLY
Status: DISCONTINUED | OUTPATIENT
Start: 2020-05-02 | End: 2020-05-02

## 2020-05-02 RX ORDER — DONEPEZIL HYDROCHLORIDE 10 MG/1
10 TABLET, FILM COATED ORAL NIGHTLY
Status: DISCONTINUED | OUTPATIENT
Start: 2020-05-02 | End: 2020-05-02

## 2020-05-02 RX ORDER — POLYETHYLENE GLYCOL 3350 17 G/17G
17 POWDER, FOR SOLUTION ORAL DAILY PRN
Status: DISCONTINUED | OUTPATIENT
Start: 2020-05-02 | End: 2020-05-05

## 2020-05-02 RX ORDER — SODIUM CHLORIDE 9 MG/ML
INJECTION, SOLUTION INTRAVENOUS CONTINUOUS
Status: DISCONTINUED | OUTPATIENT
Start: 2020-05-02 | End: 2020-05-02

## 2020-05-02 RX ORDER — DEXTROSE MONOHYDRATE 50 MG/ML
100 INJECTION, SOLUTION INTRAVENOUS PRN
Status: DISCONTINUED | OUTPATIENT
Start: 2020-05-02 | End: 2020-05-11 | Stop reason: HOSPADM

## 2020-05-02 RX ADMIN — ASPIRIN 81 MG 81 MG: 81 TABLET ORAL at 09:12

## 2020-05-02 RX ADMIN — SODIUM CHLORIDE, PRESERVATIVE FREE 10 ML: 5 INJECTION INTRAVENOUS at 22:19

## 2020-05-02 RX ADMIN — BUSPIRONE HYDROCHLORIDE 5 MG: 5 TABLET ORAL at 01:19

## 2020-05-02 RX ADMIN — ATORVASTATIN CALCIUM 80 MG: 40 TABLET, FILM COATED ORAL at 09:12

## 2020-05-02 RX ADMIN — HEPARIN SODIUM 5000 UNITS: 5000 INJECTION INTRAVENOUS; SUBCUTANEOUS at 06:19

## 2020-05-02 RX ADMIN — HEPARIN SODIUM 5000 UNITS: 5000 INJECTION INTRAVENOUS; SUBCUTANEOUS at 14:55

## 2020-05-02 RX ADMIN — DONEPEZIL HYDROCHLORIDE 10 MG: 10 TABLET, FILM COATED ORAL at 01:19

## 2020-05-02 RX ADMIN — QUETIAPINE FUMARATE 50 MG: 25 TABLET ORAL at 01:18

## 2020-05-02 RX ADMIN — HEPARIN SODIUM 2600 UNITS: 1000 INJECTION, SOLUTION INTRAVENOUS; SUBCUTANEOUS at 13:03

## 2020-05-02 RX ADMIN — HEPARIN SODIUM 5000 UNITS: 5000 INJECTION INTRAVENOUS; SUBCUTANEOUS at 22:19

## 2020-05-02 RX ADMIN — DEXTROSE MONOHYDRATE 12.5 G: 25 INJECTION, SOLUTION INTRAVENOUS at 15:17

## 2020-05-02 ASSESSMENT — PAIN SCALES - GENERAL
PAINLEVEL_OUTOF10: 0
PAINLEVEL_OUTOF10: 0

## 2020-05-02 NOTE — ED NOTES
Dr Burgess Steinberg returned call @[phone] -- parked call and notified Dr Irasema Patel.      Dianelys Clark  05/01/20 1130

## 2020-05-02 NOTE — ED NOTES
Hospitalist returned call @ [phone] returned call @[phone] -- transferred call to Dr Tisha Rubio  Waiting orders     [facility]  05/01/20 2252

## 2020-05-02 NOTE — H&P
History and Physical      Name:  Ricky Espinosa /Age/Sex: 1940  (70 y.o. male)   MRN & CSN:  [id number] & [id number] Admission Date/Time: 2020  7:15 PM   Location:  ED28/ED-28 PCP: Andrew Ho MD       Hospital day 1       History of Present Illness:     Chief Complaint: AMS , SO    Ricky Espinosa is a 70 y.o.  male with a history of dementia who presents with AMS. HE has a history of CKD. HE had tremors and change in consciousness. He had SO with a creatinine of 10 compared to baseline of 2. It was thought to be postrenal.  Nephrology was contacted. CT imaging was suggestive of bladder distension. A Pereira was placed and dark urine came out. When I examined the patient he was alert and talking. HE says he is not confused right now. Dark urine noted on the urine bag. Ten point ROS reviewed negative, unless as noted above    Objective:   No intake or output data in the 24 hours ending 20 0020   Vitals:   Vitals:    20 2046   BP: 114/73   Pulse: 75   Resp: 18   Temp:    SpO2: 98%     Physical Exam:   GEN Awake male, sitting upright in bed in no apparent distress. Appears given age. EYES Pupils are equally round. No scleral erythema, discharge, or conjunctivitis. HENT Mucous membranes are moist. Oral pharynx without exudates, no evidence of thrush. NECK Supple, no apparent thyromegaly or masses. RESP Clear to auscultation, no wheezes, rales or rhonchi. Symmetric chest movement while on room air. CARDIO/VASC S1/S2 auscultated. Regular rate without appreciable murmurs, rubs, or gallops. No JVD or carotid bruits. Peripheral pulses equal bilaterally and palpable. No peripheral edema. GI Abdomen is soft without significant tenderness, masses, or guarding. Bowel sounds are normoactive. Rectal exam deferred.  No costovertebral angle tenderness. Normal appearing external genitalia. Pereira catheter is not present.   HEME/LYMPH No palpable cervical lymphadenopathy

## 2020-05-02 NOTE — CONSULTS
Pt seen ,examined,interviewed and chart reviewed. Please see the dictated consult for details     Imp :   1. SO/CKD stage  A1- oliguric- his urine is near bland - only alb- I supect from ? ATI-  he seems to be on 600 mg/ d gabapentin at [facility] likely  Causing  toxicicty with SO- will r/o other process - he seems to have pulm edema too - shoul he does not  recover renal fx , than brpoeden the d/d - he  Has  no obstruction  per CT - has some renal  cyst- he seems to have uremia- some asterixcis and myoclonic jesr from uremia and gabapentin tocicty likley  2. Likely  gabpentin  Toxicity - also r/o serotonin syndrome- with several serotonergic meds   3. ? ADHF- little bit difficult to explain - but sybil cardiac syndrome type 3 with underlying risk factor for CAD/ cardiomyopathy unless she  Has  transient myocardial  Depression   4. Underlying / dementai/HTn/HLP/ HLP     Plan:  1.  Urgent HD cath   2. Add ck   3. uregt HD as gabapentin is dialyzable  4.  he  may recoevr renl fx ubnless he  Has severe  ATI or additonal intrinsic    process - he ahd stage 3 SO in 6/18 and neede HD x1 - also will broaden the d/d if renal fx does not improve - depending on collateral  and  Other data       Thanks for the consult    #[id number]

## 2020-05-02 NOTE — PROGRESS NOTES
Renal Replacement Therapy note     Seen on RRT, tolerating ok. BP :acceptable  Access: Rt IJ temp HD cath    Blood Flow :200 ml/ min  Dialysate Flow :400 ml/min  UF 2 kg   RRT order reviewed.     Edwin Cruz   5/2/2020

## 2020-05-02 NOTE — CONSULTS
[address] [facility]               [address] [facility], [address] [facility]                                  CONSULTATION    PATIENT NAME: Elin Brown                      :        1940  MED REC NO:   [id number]                          ROOM:       3227  ACCOUNT NO:   [de-identified]                           ADMIT DATE: 2020  PROVIDER:     Danilo Chaves MD    CONSULT DATE:  2020    CONSULT REQUESTED BY:  Jocelyn Rai MD    REASON FOR CONSULT:  Acute kidney injury with underlying CKD stage IV. BRIEF HISTORY:  The patient is a 51-year-old, looks like nursing home  resident, who was sent to the emergency room apparently with altered  mental status with myoclonic jerk, fatigue, etc.  It is reported that he  came from [place]. He might have been there for a while. We might  have to call the nursing home and see what the collateral history is. In any event, in the emergency room, the patient was rather hypotensive  with a blood pressure of 101/62 and underwent several diagnostic tests,  which include imaging and biochemical.  Imaging study, mainly CT of the  abdomen and pelvis without contrast was unrevealing. His bladder was a  little bit distended, but really no hydronephrosis. He also had a CT of  the head which did not reveal any clear-cut etiology. Chest x-ray  showed some cardiomegaly and probable pulmonary edema. Biochemical  testing showed BUN and creatinine of 83 and 10.9 respectively and CO2 of  18, and his sugar was 92. He is anemic with hemoglobin of 8.2 and  platelet count of 421,200, normal white count. Urinalysis, really no  active sediment except 100 mg/dL of albumin. His proBNP level though  was high, more than 2500. He was subsequently admitted for further  evaluation. I am a little bit familiar with him. I did see him back in 2017.   He  sees my partner, Dr. Cy Lenz, although has not been seeing him  since 2018 inpatient admission. Truly speaking, his creatinine was  running about 1.6 or so before 2017, but he had acute kidney injury and  peak creatinine was 2.8. He did recover some of the kidney function  only to readmit in 06/2018. At that time, the creatinine peaked almost  6.9. He actually needed one hemodialysis mainly for hypokalemia. He  was able to recover some of the kidney function, creatinine running in  2ish, but he never followed up with Dr. Kelli Snider. Looks like, he  probably had been in [facility]UG [facility]AB[facility] RES[facility] and is on 1600 mg per day of  gabapentin. I am unsure what really happened between 2018 and now. PAST MEDICAL HISTORY:  1. At least CKD stage IVA1, does not look like much proteinuria before. 2.  Diabetes mellitus, he is on insulin therapy. He has currently  little low blood sugar this morning. 3.  Stage III SO back in 2018 needing one dialysis. 4.  Hypertension. 5.  Hyperlipidemia. 6.  Hypothyroidism. 7.  Probable dementia as well as other underlying mental disorder. 8.  Gastroesophageal reflux disorder. PAST SURGICAL HISTORY:  1. Temporary dialysis catheter back in 06/2018 which obviously I had to  remove. 2.  He also had some ankle surgery and back surgery in the distant past.    FAMILY MEDICAL HISTORY:  Unavailable at this time. SOCIAL HISTORY:  He is in [facility] right now. I think he  had been in a nursing home for a while, even back in 2017 he was in  nursing home. I am unsure of other social history. HABITS:  Apparently does not smoke. No history of alcohol or illicit  drug abuse. ALLERGIES:  He is allergic to AMITRIPTYLINE, CODEINE, and CEFIXIME,  precise reaction is unknown to me.     HOME MEDICATIONS:  According to Epic, he was on vitamin D3, Benadryl  p.r.n., prednisolone ophthalmic drop, atorvastatin 80 mg at bedtime,  midodrine, Senokot, trazodone, Aricept, Lexapro, morphine sulfate,  gabapentin 300 mg twice a day, Tradjenta, Seroquel,

## 2020-05-02 NOTE — PROGRESS NOTES
Patient arrived to unit from ed via stretcher. Patient alert to person with some confusion noted. Skin assessment completed with Harry Libman. Patients skin is warm and dry. Abrasions noted to Bilateral knees. Patient feet are dry with flaky skin and calluses. No other skin issues noted at this time.  Will continue to monitor

## 2020-05-02 NOTE — ED PROVIDER NOTES
6/2018    Neuropathy     Non-smoker per pt    Poor historian     [address]izoaffective disorder [address] [address]     hx per old chart    Vitamin D deficiency     hx per old chart       Prior to Admission medications    Medication Sig Start Date End Date Taking? Authorizing Provider   ketorolac 0.4 % SOLN ophthalmic solution Place 1 drop into the left eye 4 times daily    Historical Provider, MD   Cholecalciferol (VITAMIN D3) 2000 units CAPS Take by mouth Daily    Historical Provider, MD   diphenhydrAMINE (BENADRYL ALLERGY) 25 MG tablet Take 25 mg by mouth nightly    Historical Provider, MD   ofloxacin (OCUFLOX) 0.3 % solution Place 1 drop into the left eye 4 times daily    Historical Provider, MD   prednisoLONE acetate (PRED FORTE) 1 % ophthalmic suspension 1 drop 4 times daily    Historical Provider, MD   atorvastatin (LIPITOR) 80 MG tablet Take 80 mg by mouth daily Indications: high cholesterol    Historical Provider, MD   midodrine (PROAMATINE) 5 MG tablet Take 5 mg by mouth 2 times daily    Historical Provider, MD   senna (SENOKOT) 8.6 MG tablet Take 2 tablets by mouth daily Indications: constipation    Historical Provider, MD   acetaminophen (TYLENOL) 500 MG tablet Take 1 tablet by mouth 4 times daily as needed for Pain 9/30/19   Azalia Diaz DO   traZODone (DESYREL) 50 MG tablet Take 50 mg by mouth 7/10/19 7/9/20  Historical Provider, MD   donepezil (ARICEPT) 10 MG tablet Take 10 mg by mouth nightly    Historical Provider, MD   gabapentin (NEURONTIN) 300 MG capsule Take 300 mg by mouth 2 times daily. Genesis Carter Historical Provider, MD   escitalopram (LEXAPRO) 20 MG tablet Take 20 mg by mouth daily    Historical Provider, MD   morphine (MS CONTIN) 15 MG extended release tablet Take 15 mg by mouth 2 times daily. Genesis Carter     Historical Provider, MD   linagliptin (TRADJENTA) 5 MG tablet Take 1 tablet by mouth daily 11/28/17   Keri Raya MD   QUEtiapine (SEROQUEL) 50 MG tablet Take 1 tablet by mouth nightly 11/27/17   Azalia Myron Whitaker MD   loperamide (IMODIUM) 2 MG capsule Take 2 mg by mouth as needed for Diarrhea    Historical Provider, MD   aspirin 81 MG chewable tablet Take 1 tablet by mouth daily 5/17/17   Zay Solis MD   busPIRone (BUSPAR) 5 MG tablet Take 1 tablet by mouth 2 times daily 5/17/17   Zay Solis MD   gabapentin (NEURONTIN) 400 MG capsule Take 2 capsules by mouth 3 times daily (with meals)  Patient taking differently: Take 400 mg by mouth 2 times daily. . 5/17/17   Zay Solis MD   insulin glargine (LANTUS) 100 UNIT/ML injection vial Inject 20 Units into the skin nightly Hold Lantus Insulin if H. S.blood glucose < 150  and call Dr. Ernst Last  Patient taking differently: Inject 25 Units into the skin nightly Hold Lantus Insulin if H. S.blood glucose < 150  and call Dr. Ernst Last 5/17/17   Zay Solis MD   levothyroxine (SYNTHROID) 100 MCG tablet Take 1 tablet by mouth Daily 5/18/17   Zay Solis MD   memantine [facility]) 5 MG tablet Take 1 tablet by mouth 2 times daily 5/17/17   Zay Solis MD   melatonin 3 MG TABS tablet Take 1 tablet by mouth nightly as needed (sleep) 5/17/17   Zay Solis MD   Multiple Vitamin (MULTI-VITAMINS PO) Take 2 tablets by mouth daily Pt takes 2 tablets of Orchard Blend fruit and 2 tablets of Orchard vegetable a day    Historical Provider, MD   omeprazole (PRILOSEC) 20 MG capsule Take 20 mg by mouth daily    Historical Provider, MD   albuterol sulfate  (90 BASE) MCG/ACT inhaler Inhale 2 puffs into the lungs every 6 hours as needed for Wheezing    Historical Provider, MD   insulin lispro (HUMALOG) 100 UNIT/ML injection vial Inject 0-12 Units into the skin 3 times daily (with meals) 3/6/16   Sole Almonte MD   cetirizine (ZYRTEC) 10 MG tablet Take 1 tablet by mouth daily 3/6/16   Sole Almonte MD        Patient Active Problem List   Diagnosis    Altered mental status    Essential hypertension    Low back pain    Acquired hypothyroidism    Panic disorder    Dystonia    Chronic kidney disease (CKD) stage G3a/A3, moderately decreased glomerular filtration rate (GFR) between 45-59 mL/min/1.73 square meter and albuminuria creatinine ratio greater than 300 mg/g (HCC)    Type 2 diabetes mellitus without complication ([facility] [address]    Ambulatory dysfunction    Non compliance w medication regimen    Hyperlipidemia    Closed fracture of distal end of fibula    Acute metabolic encephalopathy    SO (acute kidney injury) ([facility] [address]    Encephalopathy acute    Closed displaced fracture of shaft of second metacarpal bone of right hand         SURGICAL HISTORY       Past Surgical History:   Procedure Laterality Date    ANKLE SURGERY Left 06/25/2016    [facility]ospit[facility] BACK [facility]GERY      Cervical 3    COLONOSCOPY      ENDOSCOPY, COLON, DIAGNOSTIC      EYE SURGERY  09/2019    Left cataract surgery    FRACTURE SURGERY      Right Hand    HAND SURGERY Right 10/4/2019    HAND OPEN REDUCTION INTERNAL FIXATION RIGHT SECOND METACARPAL performed by Ash Osorio DO at [address] Right     Inguinal Hernia    TONSILLECTOMY           CURRENT MEDICATIONS       Previous Medications    ACETAMINOPHEN (TYLENOL) 500 MG TABLET    Take 1 tablet by mouth 4 times daily as needed for Pain    ALBUTEROL SULFATE  (90 BASE) MCG/ACT INHALER    Inhale 2 puffs into the lungs every 6 hours as needed for Wheezing    ASPIRIN 81 MG CHEWABLE TABLET    Take 1 tablet by mouth daily    ATORVASTATIN (LIPITOR) 80 MG TABLET    Take 80 mg by mouth daily Indications: high cholesterol    BUSPIRONE (BUSPAR) 5 MG TABLET    Take 1 tablet by mouth 2 times daily    CETIRIZINE (ZYRTEC) 10 MG TABLET    Take 1 tablet by mouth daily    CHOLECALCIFEROL (VITAMIN D3) 2000 UNITS CAPS    Take by mouth Daily    DIPHENHYDRAMINE (BENADRYL ALLERGY) 25 MG TABLET    Take 25 mg by mouth nightly    DONEPEZIL (ARICEPT) 10 MG TABLET    Take 10 mg by mouth nightly    ESCITALOPRAM (LEXAPRO) 20 MG TABLET    Take 20 mg by mouth daily

## 2020-05-02 NOTE — PROGRESS NOTES
Hospitalist Progress Note      Name:  Zoey Davis /Age/Sex: 1940  (70 y.o. male)   MRN & CSN:  [id number] & [id number] Admission Date/Time: 2020  7:15 PM   Location:  [facility]4/8059- PCP: Blair Barton, [address] Day: 2    Assessment and Plan:   Zoey Davis is a 70 y.o.  male  who presents with <principal problem not specified>     > Altered mental status  - sec to uremia vs metabolic encephalopathy multifactorial   - hold all sedatives Neurontin, Seroquel, trazodone, lexapro, buspar  - management as below    >SO on CKD III - Cr 10 on adm  > Hyponatremia  - consulted nephrology  - HD planned      >IDDM with hypoglycemia  - hold lantus, tradjenta, cover with SSI, hypoglycemia protocol.      >HLD  >Dementia    Diet DIET GENERAL;   DVT Prophylaxis ? Heparin   Code Status Full Code   Disposition  back to SNF pending clinical improvment     History of Present Illness:     Pt S&E. Pt somnolent, would wake up if call his name,     10-14 point ROS limited due to pt's mental status    Objective: Intake/Output Summary (Last 24 hours) at 2020 0926  Last data filed at 2020 0655  Gross per 24 hour   Intake --   Output 225 ml   Net -225 ml      Vitals:   Vitals:    20 0613   BP: (!) 165/83   Pulse: 94   Resp: 18   Temp: 98.4 °F (36.9 °C)   SpO2: 96%     Physical Exam:    GEN somnolent male, cooperative, no apparent distress. EYES No scleral erythema, discharge, or conjunctivitis. RESP Decreased air sounds. Symmetric chest movement . CARDIO/VASC S1/S2 auscultated. Regular rate. GI Abdomen is soft without significant tenderness, Bowel sounds are normoactive. MSK No gross joint deformities  SKIN Normal coloration,  NEURO/PSYCH somnolent.     Medications:   Medications:    sodium chloride flush  10 mL Intravenous 2 times per day    heparin (porcine)  5,000 Units Subcutaneous 3 times per day    aspirin  81 mg Oral Daily    atorvastatin  80 mg Oral Daily    insulin lispro

## 2020-05-03 LAB
GLUCOSE BLD-MCNC: 127 MG/DL (ref 70–99)
GLUCOSE BLD-MCNC: 127 MG/DL (ref 70–99)
GLUCOSE BLD-MCNC: 145 MG/DL (ref 70–99)
GLUCOSE BLD-MCNC: 220 MG/DL (ref 70–99)
GLUCOSE BLD-MCNC: 239 MG/DL (ref 70–99)
GLUCOSE BLD-MCNC: 72 MG/DL (ref 70–99)

## 2020-05-03 PROCEDURE — 6370000000 HC RX 637 (ALT 250 FOR IP): Performed by: INTERNAL MEDICINE

## 2020-05-03 PROCEDURE — 6360000002 HC RX W HCPCS: Performed by: INTERNAL MEDICINE

## 2020-05-03 PROCEDURE — 82962 GLUCOSE BLOOD TEST: CPT

## 2020-05-03 PROCEDURE — 94761 N-INVAS EAR/PLS OXIMETRY MLT: CPT

## 2020-05-03 PROCEDURE — 1200000000 HC SEMI PRIVATE

## 2020-05-03 PROCEDURE — 2580000003 HC RX 258: Performed by: INTERNAL MEDICINE

## 2020-05-03 PROCEDURE — 2580000003 HC RX 258: Performed by: HOSPITALIST

## 2020-05-03 PROCEDURE — 6370000000 HC RX 637 (ALT 250 FOR IP): Performed by: HOSPITALIST

## 2020-05-03 RX ORDER — MORPHINE SULFATE 15 MG/1
15 TABLET, FILM COATED, EXTENDED RELEASE ORAL EVERY 12 HOURS SCHEDULED
Status: DISCONTINUED | OUTPATIENT
Start: 2020-05-03 | End: 2020-05-07

## 2020-05-03 RX ADMIN — HEPARIN SODIUM 5000 UNITS: 5000 INJECTION INTRAVENOUS; SUBCUTANEOUS at 05:07

## 2020-05-03 RX ADMIN — ATORVASTATIN CALCIUM 80 MG: 40 TABLET, FILM COATED ORAL at 08:48

## 2020-05-03 RX ADMIN — HEPARIN SODIUM 5000 UNITS: 5000 INJECTION INTRAVENOUS; SUBCUTANEOUS at 21:42

## 2020-05-03 RX ADMIN — SODIUM CHLORIDE, PRESERVATIVE FREE 10 ML: 5 INJECTION INTRAVENOUS at 08:49

## 2020-05-03 RX ADMIN — ACETAMINOPHEN 650 MG: 325 TABLET ORAL at 12:18

## 2020-05-03 RX ADMIN — INSULIN LISPRO 2 UNITS: 100 INJECTION, SOLUTION INTRAVENOUS; SUBCUTANEOUS at 17:22

## 2020-05-03 RX ADMIN — HEPARIN SODIUM 5000 UNITS: 5000 INJECTION INTRAVENOUS; SUBCUTANEOUS at 13:19

## 2020-05-03 RX ADMIN — SODIUM CHLORIDE, PRESERVATIVE FREE 10 ML: 5 INJECTION INTRAVENOUS at 21:06

## 2020-05-03 RX ADMIN — DEXTROSE MONOHYDRATE 12.5 G: 25 INJECTION, SOLUTION INTRAVENOUS at 03:48

## 2020-05-03 RX ADMIN — DEXTROSE MONOHYDRATE 100 ML/HR: 50 INJECTION, SOLUTION INTRAVENOUS at 03:48

## 2020-05-03 RX ADMIN — INSULIN LISPRO 1 UNITS: 100 INJECTION, SOLUTION INTRAVENOUS; SUBCUTANEOUS at 12:15

## 2020-05-03 RX ADMIN — MORPHINE SULFATE 15 MG: 15 TABLET, FILM COATED, EXTENDED RELEASE ORAL at 21:05

## 2020-05-03 RX ADMIN — ASPIRIN 81 MG 81 MG: 81 TABLET ORAL at 08:48

## 2020-05-03 ASSESSMENT — PAIN DESCRIPTION - PAIN TYPE
TYPE: ACUTE PAIN
TYPE: ACUTE PAIN

## 2020-05-03 ASSESSMENT — PAIN SCALES - GENERAL
PAINLEVEL_OUTOF10: 3
PAINLEVEL_OUTOF10: 8
PAINLEVEL_OUTOF10: 0

## 2020-05-03 ASSESSMENT — PAIN DESCRIPTION - DESCRIPTORS
DESCRIPTORS: SHARP
DESCRIPTORS: HEADACHE

## 2020-05-03 ASSESSMENT — PAIN DESCRIPTION - FREQUENCY: FREQUENCY: CONTINUOUS

## 2020-05-03 ASSESSMENT — PAIN DESCRIPTION - ORIENTATION: ORIENTATION: RIGHT

## 2020-05-03 ASSESSMENT — PAIN DESCRIPTION - LOCATION
LOCATION: NECK
LOCATION: HEAD

## 2020-05-03 NOTE — PROGRESS NOTES
lateralizing weakness. PSYCH Awake, alert, oriented x 2. Affect appropriate.     Medications:   Medications:    sodium chloride flush  10 mL Intravenous 2 times per day    heparin (porcine)  5,000 Units Subcutaneous 3 times per day    aspirin  81 mg Oral Daily    atorvastatin  80 mg Oral Daily    insulin lispro  0-6 Units Subcutaneous TID WC    insulin lispro  0-3 Units Subcutaneous Nightly      Infusions:    dextrose 100 mL/hr (05/03/20 0348)     PRN Meds: sodium chloride flush, 10 mL, PRN  acetaminophen, 650 mg, Q6H PRN    Or  acetaminophen, 650 mg, Q6H PRN  polyethylene glycol, 17 g, Daily PRN  promethazine, 12.5 mg, Q6H PRN    Or  ondansetron, 4 mg, Q6H PRN  acetaminophen, 500 mg, 4x Daily PRN  albuterol, 2.5 mg, Q6H PRN  glucose, 15 g, PRN  dextrose, 12.5 g, PRN  glucagon (rDNA), 1 mg, PRN  dextrose, 100 mL/hr, PRN          Electronically signed by Ronny Erwin MD on 5/3/2020 at 9:32 AM

## 2020-05-04 LAB
ALBUMIN SERPL-MCNC: 3.2 GM/DL (ref 3.4–5)
ALP BLD-CCNC: 945 IU/L (ref 40–128)
ALT SERPL-CCNC: 113 U/L (ref 10–40)
ANION GAP SERPL CALCULATED.3IONS-SCNC: 20 MMOL/L (ref 4–16)
AST SERPL-CCNC: 148 IU/L (ref 15–37)
BILIRUB SERPL-MCNC: 1.9 MG/DL (ref 0–1)
BUN BLDV-MCNC: 80 MG/DL (ref 6–23)
CALCIUM SERPL-MCNC: 7.5 MG/DL (ref 8.3–10.6)
CHLORIDE BLD-SCNC: 86 MMOL/L (ref 99–110)
CO2: 22 MMOL/L (ref 21–32)
CREAT SERPL-MCNC: 11.7 MG/DL (ref 0.9–1.3)
FERRITIN: 1411 NG/ML (ref 30–400)
GFR AFRICAN AMERICAN: 5 ML/MIN/1.73M2
GFR NON-AFRICAN AMERICAN: 4 ML/MIN/1.73M2
GLUCOSE BLD-MCNC: 145 MG/DL (ref 70–99)
GLUCOSE BLD-MCNC: 162 MG/DL (ref 70–99)
GLUCOSE BLD-MCNC: 175 MG/DL (ref 70–99)
GLUCOSE BLD-MCNC: 203 MG/DL (ref 70–99)
GLUCOSE BLD-MCNC: 229 MG/DL (ref 70–99)
GLUCOSE BLD-MCNC: 229 MG/DL (ref 70–99)
IRON: 42 UG/DL (ref 59–158)
MAGNESIUM: 2.6 MG/DL (ref 1.8–2.4)
PCT TRANSFERRIN: 25 % (ref 10–44)
PHOSPHORUS: 5.5 MG/DL (ref 2.5–4.9)
POTASSIUM SERPL-SCNC: 4.3 MMOL/L (ref 3.5–5.1)
SODIUM BLD-SCNC: 128 MMOL/L (ref 135–145)
TOTAL CK: 1845 IU/L (ref 38–174)
TOTAL IRON BINDING CAPACITY: 168 UG/DL (ref 250–450)
TOTAL PROTEIN: 6.1 GM/DL (ref 6.4–8.2)
UNSATURATED IRON BINDING CAPACITY: 126 UG/DL (ref 110–370)

## 2020-05-04 PROCEDURE — 6360000002 HC RX W HCPCS: Performed by: INTERNAL MEDICINE

## 2020-05-04 PROCEDURE — 80053 COMPREHEN METABOLIC PANEL: CPT

## 2020-05-04 PROCEDURE — 82962 GLUCOSE BLOOD TEST: CPT

## 2020-05-04 PROCEDURE — 83550 IRON BINDING TEST: CPT

## 2020-05-04 PROCEDURE — 83540 ASSAY OF IRON: CPT

## 2020-05-04 PROCEDURE — 82728 ASSAY OF FERRITIN: CPT

## 2020-05-04 PROCEDURE — 83735 ASSAY OF MAGNESIUM: CPT

## 2020-05-04 PROCEDURE — 90935 HEMODIALYSIS ONE EVALUATION: CPT

## 2020-05-04 PROCEDURE — 1200000000 HC SEMI PRIVATE

## 2020-05-04 PROCEDURE — 84100 ASSAY OF PHOSPHORUS: CPT

## 2020-05-04 PROCEDURE — 82550 ASSAY OF CK (CPK): CPT

## 2020-05-04 PROCEDURE — 36415 COLL VENOUS BLD VENIPUNCTURE: CPT

## 2020-05-04 PROCEDURE — 6370000000 HC RX 637 (ALT 250 FOR IP): Performed by: HOSPITALIST

## 2020-05-04 RX ORDER — HEPARIN SODIUM 1000 [USP'U]/ML
2600 INJECTION, SOLUTION INTRAVENOUS; SUBCUTANEOUS
Status: COMPLETED | OUTPATIENT
Start: 2020-05-04 | End: 2020-05-04

## 2020-05-04 RX ADMIN — EPOETIN ALFA-EPBX 2000 UNITS: 2000 INJECTION, SOLUTION INTRAVENOUS; SUBCUTANEOUS at 09:51

## 2020-05-04 RX ADMIN — HEPARIN SODIUM 2600 UNITS: 1000 INJECTION, SOLUTION INTRAVENOUS; SUBCUTANEOUS at 11:15

## 2020-05-04 RX ADMIN — INSULIN LISPRO 1 UNITS: 100 INJECTION, SOLUTION INTRAVENOUS; SUBCUTANEOUS at 13:51

## 2020-05-04 RX ADMIN — HEPARIN SODIUM 5000 UNITS: 5000 INJECTION INTRAVENOUS; SUBCUTANEOUS at 05:47

## 2020-05-04 RX ADMIN — EPOETIN ALFA-EPBX 3000 UNITS: 3000 INJECTION, SOLUTION INTRAVENOUS; SUBCUTANEOUS at 09:51

## 2020-05-04 RX ADMIN — MORPHINE SULFATE 15 MG: 15 TABLET, FILM COATED, EXTENDED RELEASE ORAL at 21:08

## 2020-05-04 RX ADMIN — INSULIN LISPRO 1 UNITS: 100 INJECTION, SOLUTION INTRAVENOUS; SUBCUTANEOUS at 18:20

## 2020-05-04 RX ADMIN — HEPARIN SODIUM 5000 UNITS: 5000 INJECTION INTRAVENOUS; SUBCUTANEOUS at 15:56

## 2020-05-04 RX ADMIN — HEPARIN SODIUM 5000 UNITS: 5000 INJECTION INTRAVENOUS; SUBCUTANEOUS at 21:11

## 2020-05-04 ASSESSMENT — PAIN SCALES - GENERAL
PAINLEVEL_OUTOF10: 6
PAINLEVEL_OUTOF10: 0
PAINLEVEL_OUTOF10: 6
PAINLEVEL_OUTOF10: 0
PAINLEVEL_OUTOF10: 6

## 2020-05-04 ASSESSMENT — PAIN DESCRIPTION - FREQUENCY: FREQUENCY: INTERMITTENT

## 2020-05-04 ASSESSMENT — PAIN DESCRIPTION - PAIN TYPE: TYPE: ACUTE PAIN

## 2020-05-04 ASSESSMENT — PAIN DESCRIPTION - LOCATION: LOCATION: NECK

## 2020-05-04 ASSESSMENT — PAIN DESCRIPTION - PROGRESSION: CLINICAL_PROGRESSION: NOT CHANGED

## 2020-05-04 NOTE — PROGRESS NOTES
Recommendation/Plan  :   1.   -serum creatinine has progressively trending higher  -completed HD today: 2.5 hours and UF during HD: 1.5 liters  -no current indications of renal recovery at present time   2.   -negligible urine output   -fluid management via HD / UF  3.   -on SSI for diabetes management   4.   -latest Hb: 8.2; follow hemoglobin trend   -retacrit given during HD today   -CBC in am   5.   -recent systolics: 93 - 881  6.   -hyponatremia management via HD / UF   -please hold all serotonergic medications   7.    -CK level improved from previous blood draw    Electronically signed by ALENA Berger - [facility]         Nephrology Attending Progress Note  5/4/2020 2:51 PM  Subjective:   Admit Date: 5/1/2020  PCP: Rodriguez Werner MD    Interval History: I have personally performed face to face diagnostic evaluation on this patient. I have personally reviewed pertinent labs and imaging and agree with the care plan above.  My additional findings are as follows:  Pt seen and examined on dialysis and try interact but confused and tried pulling on wilburn catheter    Objective:   Vitals: /60   Pulse 78   Temp 98.4 °F (36.9 °C) (Oral)   Resp 16   Ht 5' 7\" (1.702 m)   Wt 153 lb 3.5 oz (69.5 kg)   SpO2 95%   BMI 24.00 kg/m²   Weak arousable  Soft nt  Confused  wilburn    Assessment and Plan:  IMP:  As stated above    Plan     1 bp stable monitor  2 HdX1 today and monitor  3 fu affect and held meds with sedation  4 ssi and monitor  5 monitor hb  6 na low stable monitor with hd   For now give time for meds get out of system and follow             Electronically signed by Parker Almeida MD on 5/4/2020 at 2:51 PM

## 2020-05-05 ENCOUNTER — APPOINTMENT (OUTPATIENT)
Dept: ULTRASOUND IMAGING | Age: 80
DRG: 673 | End: 2020-05-05
Payer: MEDICARE

## 2020-05-05 LAB
ANION GAP SERPL CALCULATED.3IONS-SCNC: 18 MMOL/L (ref 4–16)
BUN BLDV-MCNC: 58 MG/DL (ref 6–23)
CALCIUM SERPL-MCNC: 8.3 MG/DL (ref 8.3–10.6)
CHLORIDE BLD-SCNC: 84 MMOL/L (ref 99–110)
CO2: 22 MMOL/L (ref 21–32)
CREAT SERPL-MCNC: 8.6 MG/DL (ref 0.9–1.3)
GFR AFRICAN AMERICAN: 7 ML/MIN/1.73M2
GFR NON-AFRICAN AMERICAN: 6 ML/MIN/1.73M2
GLUCOSE BLD-MCNC: 174 MG/DL (ref 70–99)
GLUCOSE BLD-MCNC: 183 MG/DL (ref 70–99)
GLUCOSE BLD-MCNC: 239 MG/DL (ref 70–99)
GLUCOSE BLD-MCNC: 247 MG/DL (ref 70–99)
GLUCOSE BLD-MCNC: 303 MG/DL (ref 70–99)
HCT VFR BLD CALC: 28.1 % (ref 42–52)
HEMOGLOBIN: 8 GM/DL (ref 13.5–18)
MCH RBC QN AUTO: 28.8 PG (ref 27–31)
MCHC RBC AUTO-ENTMCNC: 28.5 % (ref 32–36)
MCV RBC AUTO: 101.1 FL (ref 78–100)
PDW BLD-RTO: 14.1 % (ref 11.7–14.9)
PLATELET # BLD: 223 K/CU MM (ref 140–440)
PMV BLD AUTO: 11.2 FL (ref 7.5–11.1)
POTASSIUM SERPL-SCNC: 5 MMOL/L (ref 3.5–5.1)
RBC # BLD: 2.78 M/CU MM (ref 4.6–6.2)
SODIUM BLD-SCNC: 124 MMOL/L (ref 135–145)
WBC # BLD: 12.5 K/CU MM (ref 4–10.5)

## 2020-05-05 PROCEDURE — 92610 EVALUATE SWALLOWING FUNCTION: CPT | Performed by: SPEECH-LANGUAGE PATHOLOGIST

## 2020-05-05 PROCEDURE — 6360000002 HC RX W HCPCS: Performed by: INTERNAL MEDICINE

## 2020-05-05 PROCEDURE — 2580000003 HC RX 258: Performed by: INTERNAL MEDICINE

## 2020-05-05 PROCEDURE — 6370000000 HC RX 637 (ALT 250 FOR IP): Performed by: INTERNAL MEDICINE

## 2020-05-05 PROCEDURE — 36415 COLL VENOUS BLD VENIPUNCTURE: CPT

## 2020-05-05 PROCEDURE — 1200000000 HC SEMI PRIVATE

## 2020-05-05 PROCEDURE — 6370000000 HC RX 637 (ALT 250 FOR IP): Performed by: HOSPITALIST

## 2020-05-05 PROCEDURE — 85027 COMPLETE CBC AUTOMATED: CPT

## 2020-05-05 PROCEDURE — 6370000000 HC RX 637 (ALT 250 FOR IP)

## 2020-05-05 PROCEDURE — 82962 GLUCOSE BLOOD TEST: CPT

## 2020-05-05 PROCEDURE — 2709999900 HC NON-CHARGEABLE SUPPLY

## 2020-05-05 PROCEDURE — 6370000000 HC RX 637 (ALT 250 FOR IP): Performed by: PHYSICIAN ASSISTANT

## 2020-05-05 PROCEDURE — 76775 US EXAM ABDO BACK WALL LIM: CPT

## 2020-05-05 PROCEDURE — 80048 BASIC METABOLIC PNL TOTAL CA: CPT

## 2020-05-05 PROCEDURE — 94761 N-INVAS EAR/PLS OXIMETRY MLT: CPT

## 2020-05-05 RX ORDER — SODIUM CHLORIDE 9 MG/ML
INJECTION, SOLUTION INTRAVENOUS CONTINUOUS
Status: DISCONTINUED | OUTPATIENT
Start: 2020-05-05 | End: 2020-05-08

## 2020-05-05 RX ORDER — DOCUSATE SODIUM 100 MG/1
100 CAPSULE, LIQUID FILLED ORAL 2 TIMES DAILY
Status: DISCONTINUED | OUTPATIENT
Start: 2020-05-05 | End: 2020-05-07

## 2020-05-05 RX ORDER — POLYETHYLENE GLYCOL 3350 17 G/17G
17 POWDER, FOR SOLUTION ORAL DAILY
Status: DISCONTINUED | OUTPATIENT
Start: 2020-05-05 | End: 2020-05-07

## 2020-05-05 RX ADMIN — INSULIN LISPRO 2 UNITS: 100 INJECTION, SOLUTION INTRAVENOUS; SUBCUTANEOUS at 13:14

## 2020-05-05 RX ADMIN — HEPARIN SODIUM 5000 UNITS: 5000 INJECTION INTRAVENOUS; SUBCUTANEOUS at 22:27

## 2020-05-05 RX ADMIN — Medication 1 EACH: at 03:17

## 2020-05-05 RX ADMIN — INSULIN LISPRO 4 UNITS: 100 INJECTION, SOLUTION INTRAVENOUS; SUBCUTANEOUS at 18:33

## 2020-05-05 RX ADMIN — MORPHINE SULFATE 15 MG: 15 TABLET, FILM COATED, EXTENDED RELEASE ORAL at 22:27

## 2020-05-05 RX ADMIN — Medication: at 02:49

## 2020-05-05 RX ADMIN — HEPARIN SODIUM 5000 UNITS: 5000 INJECTION INTRAVENOUS; SUBCUTANEOUS at 14:55

## 2020-05-05 RX ADMIN — DOCUSATE SODIUM 100 MG: 100 CAPSULE, LIQUID FILLED ORAL at 10:53

## 2020-05-05 RX ADMIN — DOCUSATE SODIUM 100 MG: 100 CAPSULE, LIQUID FILLED ORAL at 22:27

## 2020-05-05 RX ADMIN — SODIUM CHLORIDE, PRESERVATIVE FREE 10 ML: 5 INJECTION INTRAVENOUS at 22:28

## 2020-05-05 RX ADMIN — SODIUM CHLORIDE: 9 INJECTION, SOLUTION INTRAVENOUS at 13:59

## 2020-05-05 RX ADMIN — MORPHINE SULFATE 15 MG: 15 TABLET, FILM COATED, EXTENDED RELEASE ORAL at 10:45

## 2020-05-05 RX ADMIN — HEPARIN SODIUM 5000 UNITS: 5000 INJECTION INTRAVENOUS; SUBCUTANEOUS at 05:39

## 2020-05-05 RX ADMIN — ONDANSETRON HYDROCHLORIDE 4 MG: 2 SOLUTION INTRAMUSCULAR; INTRAVENOUS at 03:17

## 2020-05-05 RX ADMIN — DARBEPOETIN ALFA 100 MCG: 100 SOLUTION INTRAVENOUS; SUBCUTANEOUS at 14:50

## 2020-05-05 RX ADMIN — INSULIN LISPRO 1 UNITS: 100 INJECTION, SOLUTION INTRAVENOUS; SUBCUTANEOUS at 09:31

## 2020-05-05 RX ADMIN — ACETAMINOPHEN 500 MG: 500 TABLET ORAL at 14:58

## 2020-05-05 RX ADMIN — ATORVASTATIN CALCIUM 80 MG: 40 TABLET, FILM COATED ORAL at 10:44

## 2020-05-05 RX ADMIN — ASPIRIN 81 MG 81 MG: 81 TABLET ORAL at 10:40

## 2020-05-05 RX ADMIN — POLYETHYLENE GLYCOL (3350) 17 G: 17 POWDER, FOR SOLUTION ORAL at 10:45

## 2020-05-05 RX ADMIN — SODIUM CHLORIDE, PRESERVATIVE FREE 10 ML: 5 INJECTION INTRAVENOUS at 10:45

## 2020-05-05 ASSESSMENT — PAIN SCALES - GENERAL
PAINLEVEL_OUTOF10: 9
PAINLEVEL_OUTOF10: 3
PAINLEVEL_OUTOF10: 9
PAINLEVEL_OUTOF10: 6

## 2020-05-05 NOTE — PROGRESS NOTES
3557  Gross per 24 hour   Intake 630 ml   Output 2000 ml   Net -1370 ml      Vitals:   Vitals:    05/05/20 0503   BP: 125/65   Pulse: 67   Resp: 17   Temp: 97.3 °F (36.3 °C)   SpO2: 91%     Physical Exam:   GEN: Awake male, nontoxic appearing. Right IJ in place. EYES: No eye discharge. HENT: Membranes moist.  No nasal discharge. NECK: Supple,  RESP: No wheezing. No crackles. Symmetric breath sounds. CV: Regular rate and rhythm. No murmur. No peripheral edema. GI: Soft abdomen. Nontender. Nondistended. : Pereira in place. MSK: No bony fractures. No gross deformities. SKIN: warm, dry, no rashes  NEURO: Cranial nerves appear grossly intact, normal speech, no focal deficit.   PSYCH: Awake, alert, oriented     Medications:   Medications:    polyethylene glycol  17 g Oral Daily    docusate sodium  100 mg Oral BID    morphine  15 mg Oral 2 times per day    sodium chloride flush  10 mL Intravenous 2 times per day    heparin (porcine)  5,000 Units Subcutaneous 3 times per day    aspirin  81 mg Oral Daily    atorvastatin  80 mg Oral Daily    insulin lispro  0-6 Units Subcutaneous TID WC    insulin lispro  0-3 Units Subcutaneous Nightly      Infusions:    dextrose 100 mL/hr (05/03/20 0348)     PRN Meds: microfibrillar collagen, , PRN  sodium chloride flush, 10 mL, PRN  acetaminophen, 650 mg, Q6H PRN    Or  acetaminophen, 650 mg, Q6H PRN  promethazine, 12.5 mg, Q6H PRN    Or  ondansetron, 4 mg, Q6H PRN  acetaminophen, 500 mg, 4x Daily PRN  albuterol, 2.5 mg, Q6H PRN  glucose, 15 g, PRN  dextrose, 12.5 g, PRN  glucagon (rDNA), 1 mg, PRN  dextrose, 100 mL/hr, PRN      Electronically signed by Ej Rosa MD on 5/5/2020 at 9:10 AM

## 2020-05-05 NOTE — PROGRESS NOTES
Results   Component Value Date    LABALBU 3.2 05/04/2020    LABALBU 100 06/01/2018     Calcium:    Lab Results   Component Value Date    CALCIUM 8.3 05/05/2020     Phosphorus:    Lab Results   Component Value Date    PHOS 5.5 05/04/2020     U/A:    Lab Results   Component Value Date    NITRU NEGATIVE 05/01/2020    COLORU ELIDA 05/01/2020    WBCUA <1 05/01/2020    RBCUA 1 05/01/2020    MUCUS RARE 06/01/2018    TRICHOMONAS NONE SEEN 05/01/2020    BACTERIA NEGATIVE 05/01/2020    CLARITYU CLEAR 05/01/2020    SPECGRAV 1.015 05/01/2020    UROBILINOGEN NORMAL 05/01/2020    BILIRUBINUR NEGATIVE 05/01/2020    BLOODU SMALL 05/01/2020    KETUA NEGATIVE 05/01/2020     ABG:  No results found for: PHART, ENR0MOL, PO2ART, PLM6LKR, BEART, THGBART, HQE1XIT, H0ZJGVFI  HgBA1c:    Lab Results   Component Value Date    LABA1C 7.4 05/02/2020     Microalbumen/Creatinine ratio:  No components found for: RUCREAT          Objective:   Vitals: /62   Pulse 70   Temp 97.8 °F (36.6 °C) (Oral)   Resp 16   Ht 5' 7\" (1.702 m)   Wt 156 lb 12.8 oz (71.1 kg)   SpO2 94%   BMI 24.56 kg/m²   General appearance: awake weak  HEENT: Head: Normal, normocephalic, atraumatic.   Neck: supple, symmetrical, trachea midline  Lungs: diminished breath sounds bilaterally  Heart: S1, S2 normal  Abdomen: abnormal findings:  soft nt  Extremities: edema trace  Neurologic: Mental status: alertness: awake      Patient Active Problem List:     Altered mental status     Essential hypertension     Low back pain     Acquired hypothyroidism     Panic disorder     Dystonia     Chronic kidney disease (CKD) stage G3a/A3, moderately decreased glomerular filtration rate (GFR) between 45-59 mL/min/1.73 square meter and albuminuria creatinine ratio greater than 300 mg/g ([facility])     Type 2 diabetes mellitus without complication (HCC)     Ambulatory dysfunction     Non compliance w medication regimen     Hyperlipidemia     Closed fracture of distal end of fibula     Acute metabolic encephalopathy     SO (acute kidney injury) ([facility] [address]     Encephalopathy acute     Closed displaced fracture of shaft of second metacarpal bone of right hand    Renal US  FINDINGS:  Exam was markedly limited and had to be terminated early due to patient  combativeness.  The exam was unable to be completed.  Three images of the  right kidney demonstrate no definite hydronephrosis or increased cortical  echogenicity.      Impression:       Limited exam due to early termination secondary to patient combativeness. Limited view of the right kidney demonstrates no definite hydronephrosis. Please refer to dedicated CT performed on 05/01/2020 for evaluation of the  kidneys.  No hydronephrosis is noted on prior CT. Assessment and Plan:      IMP:  arf from atn on ckd 4  CHF diastolic ef 06-77%  Dm2 controlled  Anemia  Hyponatremia/hyperkalemia  Met encephalopathy  Hypotension    Plan     1 monitor renal function off dialysis and affect appear better. Hope can recover renal still. Off the neurontin and possible pra with med affect. No obstruction. 2 volume stbale not on diuretic  3 glucsoe stable overall  4 no active bleed monitor hb check iron  5 gentle ivf for na and monitor K, also give fluid for increase CK  6 monitor affect and is better  7 was on proamatine outpt and monitor bp  Will follow in setting psych meds and more stable and not complain of pain  Will try call daughter and give update.  dw pt    Also bleeding from hd catheter and after saw IR now stable  And left message for daughter with my number           Mervin Garner MD

## 2020-05-05 NOTE — PLAN OF CARE
Problem: SAFETY  Goal: Free from accidental physical injury  Outcome: Ongoing     Problem: DAILY CARE  Goal: Daily care needs are met  Outcome: Ongoing     Problem: PAIN  Goal: Patient's pain/discomfort is manageable  Outcome: Ongoing     Problem: KNOWLEDGE DEFICIT  Goal: Patient/S.O. demonstrates understanding of disease process, treatment plan, medications, and discharge instructions.   Outcome: Ongoing     Problem: DISCHARGE BARRIERS  Goal: Patient's continuum of care needs are met  Outcome: Ongoing     Problem: Pain:  Goal: Pain level will decrease  Description: Pain level will decrease  Outcome: Ongoing  Goal: Control of acute pain  Description: Control of acute pain  Outcome: Ongoing  Goal: Control of chronic pain  Description: Control of chronic pain  Outcome: Ongoing

## 2020-05-06 LAB
ANION GAP SERPL CALCULATED.3IONS-SCNC: 15 MMOL/L (ref 4–16)
BUN BLDV-MCNC: 78 MG/DL (ref 6–23)
CALCIUM SERPL-MCNC: 7.7 MG/DL (ref 8.3–10.6)
CHLORIDE BLD-SCNC: 90 MMOL/L (ref 99–110)
CO2: 22 MMOL/L (ref 21–32)
CREAT SERPL-MCNC: 10 MG/DL (ref 0.9–1.3)
GFR AFRICAN AMERICAN: 6 ML/MIN/1.73M2
GFR NON-AFRICAN AMERICAN: 5 ML/MIN/1.73M2
GLUCOSE BLD-MCNC: 159 MG/DL (ref 70–99)
GLUCOSE BLD-MCNC: 174 MG/DL (ref 70–99)
GLUCOSE BLD-MCNC: 175 MG/DL (ref 70–99)
GLUCOSE BLD-MCNC: 196 MG/DL (ref 70–99)
GLUCOSE BLD-MCNC: 212 MG/DL (ref 70–99)
POTASSIUM SERPL-SCNC: 4.5 MMOL/L (ref 3.5–5.1)
REASON FOR REJECTION: NORMAL
REJECTED TEST: NORMAL
SODIUM BLD-SCNC: 127 MMOL/L (ref 135–145)

## 2020-05-06 PROCEDURE — 2580000003 HC RX 258: Performed by: INTERNAL MEDICINE

## 2020-05-06 PROCEDURE — 6360000002 HC RX W HCPCS: Performed by: INTERNAL MEDICINE

## 2020-05-06 PROCEDURE — 36415 COLL VENOUS BLD VENIPUNCTURE: CPT

## 2020-05-06 PROCEDURE — 6370000000 HC RX 637 (ALT 250 FOR IP): Performed by: HOSPITALIST

## 2020-05-06 PROCEDURE — 94761 N-INVAS EAR/PLS OXIMETRY MLT: CPT

## 2020-05-06 PROCEDURE — 80048 BASIC METABOLIC PNL TOTAL CA: CPT

## 2020-05-06 PROCEDURE — 6370000000 HC RX 637 (ALT 250 FOR IP): Performed by: INTERNAL MEDICINE

## 2020-05-06 PROCEDURE — 90935 HEMODIALYSIS ONE EVALUATION: CPT

## 2020-05-06 PROCEDURE — 1200000000 HC SEMI PRIVATE

## 2020-05-06 PROCEDURE — 82962 GLUCOSE BLOOD TEST: CPT

## 2020-05-06 RX ORDER — LORAZEPAM 2 MG/ML
0.25 INJECTION INTRAMUSCULAR ONCE
Status: COMPLETED | OUTPATIENT
Start: 2020-05-06 | End: 2020-05-06

## 2020-05-06 RX ORDER — HEPARIN SODIUM 1000 [USP'U]/ML
2600 INJECTION, SOLUTION INTRAVENOUS; SUBCUTANEOUS
Status: COMPLETED | OUTPATIENT
Start: 2020-05-06 | End: 2020-05-06

## 2020-05-06 RX ADMIN — SODIUM CHLORIDE: 9 INJECTION, SOLUTION INTRAVENOUS at 03:28

## 2020-05-06 RX ADMIN — HEPARIN SODIUM 2600 UNITS: 1000 INJECTION, SOLUTION INTRAVENOUS; SUBCUTANEOUS at 12:38

## 2020-05-06 RX ADMIN — ATORVASTATIN CALCIUM 80 MG: 40 TABLET, FILM COATED ORAL at 14:03

## 2020-05-06 RX ADMIN — ONDANSETRON HYDROCHLORIDE 4 MG: 2 SOLUTION INTRAMUSCULAR; INTRAVENOUS at 22:06

## 2020-05-06 RX ADMIN — ASPIRIN 81 MG 81 MG: 81 TABLET ORAL at 14:04

## 2020-05-06 RX ADMIN — SODIUM CHLORIDE, PRESERVATIVE FREE 10 ML: 5 INJECTION INTRAVENOUS at 22:07

## 2020-05-06 RX ADMIN — INSULIN LISPRO 2 UNITS: 100 INJECTION, SOLUTION INTRAVENOUS; SUBCUTANEOUS at 18:42

## 2020-05-06 RX ADMIN — HEPARIN SODIUM 5000 UNITS: 5000 INJECTION INTRAVENOUS; SUBCUTANEOUS at 14:03

## 2020-05-06 RX ADMIN — HEPARIN SODIUM 5000 UNITS: 5000 INJECTION INTRAVENOUS; SUBCUTANEOUS at 22:06

## 2020-05-06 RX ADMIN — HEPARIN SODIUM 5000 UNITS: 5000 INJECTION INTRAVENOUS; SUBCUTANEOUS at 05:38

## 2020-05-06 RX ADMIN — POLYETHYLENE GLYCOL (3350) 17 G: 17 POWDER, FOR SOLUTION ORAL at 14:03

## 2020-05-06 RX ADMIN — SODIUM CHLORIDE: 9 INJECTION, SOLUTION INTRAVENOUS at 16:51

## 2020-05-06 RX ADMIN — MORPHINE SULFATE 15 MG: 15 TABLET, FILM COATED, EXTENDED RELEASE ORAL at 22:05

## 2020-05-06 RX ADMIN — DOCUSATE SODIUM 100 MG: 100 CAPSULE, LIQUID FILLED ORAL at 14:03

## 2020-05-06 RX ADMIN — INSULIN LISPRO 1 UNITS: 100 INJECTION, SOLUTION INTRAVENOUS; SUBCUTANEOUS at 08:39

## 2020-05-06 RX ADMIN — LORAZEPAM 0.25 MG: 2 INJECTION, SOLUTION INTRAMUSCULAR; INTRAVENOUS at 10:44

## 2020-05-06 RX ADMIN — INSULIN LISPRO 1 UNITS: 100 INJECTION, SOLUTION INTRAVENOUS; SUBCUTANEOUS at 14:19

## 2020-05-06 RX ADMIN — DOCUSATE SODIUM 100 MG: 100 CAPSULE, LIQUID FILLED ORAL at 22:05

## 2020-05-06 ASSESSMENT — PAIN DESCRIPTION - LOCATION: LOCATION: NECK

## 2020-05-06 ASSESSMENT — PAIN DESCRIPTION - PAIN TYPE: TYPE: ACUTE PAIN

## 2020-05-06 ASSESSMENT — PAIN DESCRIPTION - FREQUENCY: FREQUENCY: INTERMITTENT

## 2020-05-06 ASSESSMENT — PAIN SCALES - GENERAL
PAINLEVEL_OUTOF10: 0
PAINLEVEL_OUTOF10: 0
PAINLEVEL_OUTOF10: 10
PAINLEVEL_OUTOF10: 0

## 2020-05-06 ASSESSMENT — PAIN DESCRIPTION - ORIENTATION: ORIENTATION: RIGHT

## 2020-05-06 ASSESSMENT — PAIN DESCRIPTION - PROGRESSION: CLINICAL_PROGRESSION: NOT CHANGED

## 2020-05-06 ASSESSMENT — PAIN DESCRIPTION - DESCRIPTORS: DESCRIPTORS: SHARP

## 2020-05-06 NOTE — PROGRESS NOTES
Nephrology Progress Note  5/6/2020 12:24 PM  Subjective:   Admit Date: 5/1/2020  PCP: Morelia Zavala MD  Interval History: pt appear stable and tolerating dialysis but was anxious and wanted off early    Diet: DIET GENERAL; Dysphagia Minced and Moist; Daily Fluid Restriction: 1800 ml; Low Potassium  Pain is:Mild      Data:   Scheduled Meds:   heparin (porcine)  2,600 Units Intracatheter Once in dialysis    polyethylene glycol  17 g Oral Daily    docusate sodium  100 mg Oral BID    morphine  15 mg Oral 2 times per day    sodium chloride flush  10 mL Intravenous 2 times per day    heparin (porcine)  5,000 Units Subcutaneous 3 times per day    aspirin  81 mg Oral Daily    atorvastatin  80 mg Oral Daily    insulin lispro  0-6 Units Subcutaneous TID WC    insulin lispro  0-3 Units Subcutaneous Nightly     Continuous Infusions:   sodium chloride 75 mL/hr at 05/06/20 0328    dextrose 100 mL/hr (05/03/20 0348)     PRN Meds:microfibrillar collagen, sodium chloride flush, acetaminophen **OR** acetaminophen, promethazine **OR** ondansetron, acetaminophen, albuterol, glucose, dextrose, glucagon (rDNA), dextrose  I/O last 3 completed shifts: In: 320 [P.O.:20; I.V.:300]  Out: 50 [Urine:50]  No intake/output data recorded. Intake/Output Summary (Last 24 hours) at 5/6/2020 1224  Last data filed at 5/6/2020 0321  Gross per 24 hour   Intake 320 ml   Output 50 ml   Net 270 ml     CBC:   Recent Labs     05/05/20 0358   WBC 12.5*   HGB 8.0*        BMP:    Recent Labs     05/04/20 0418 05/05/20 0358 05/06/20  0524   * 124* 127*   K 4.3 5.0 4.5   CL 86* 84* 90*   CO2 22 22 22   BUN 80* 58* 78*   CREATININE 11.7* 8.6* 10.0*   GLUCOSE 229* 174* 196*     Hepatic:   Recent Labs     05/04/20 0418   *   *   BILITOT 1.9*   ALKPHOS 945*     Troponin: No results for input(s): TROPONINI in the last 72 hours. BNP: No results for input(s): BNP in the last 72 hours.   Lipids: No results for input(s): CHOL, HDL in the last 72 hours. Invalid input(s): LDLCALCU  ABGs: No results found for: PHART, PO2ART, VGE8TYF  INR: No results for input(s): INR in the last 72 hours. Renal Labs  Albumin:    Lab Results   Component Value Date    LABALBU 3.2 05/04/2020    LABALBU 100 06/01/2018     Calcium:    Lab Results   Component Value Date    CALCIUM 7.7 05/06/2020     Phosphorus:    Lab Results   Component Value Date    PHOS 5.5 05/04/2020     U/A:    Lab Results   Component Value Date    NITRU NEGATIVE 05/01/2020    COLORU ELIDA 05/01/2020    WBCUA <1 05/01/2020    RBCUA 1 05/01/2020    MUCUS RARE 06/01/2018    TRICHOMONAS NONE SEEN 05/01/2020    BACTERIA NEGATIVE 05/01/2020    CLARITYU CLEAR 05/01/2020    SPECGRAV 1.015 05/01/2020    UROBILINOGEN NORMAL 05/01/2020    BILIRUBINUR NEGATIVE 05/01/2020    BLOODU SMALL 05/01/2020    KETUA NEGATIVE 05/01/2020     ABG:  No results found for: PHART, EQM5QUL, PO2ART, ATF3PMK, BEART, THGBART, VNH2OSM, S0SSVIMY  HgBA1c:    Lab Results   Component Value Date    LABA1C 7.4 05/02/2020     Microalbumen/Creatinine ratio:  No components found for: RUCREAT          Objective:   Vitals: /62   Pulse 84   Temp 98.4 °F (36.9 °C)   Resp 16   Ht 5' 7\" (1.702 m)   Wt 160 lb 1.6 oz (72.6 kg)   SpO2 95%   BMI 25.08 kg/m²   General appearance: awake weak  HEENT: Head: Normal, normocephalic, atraumatic.   Neck: supple, symmetrical, trachea midline  Lungs: diminished breath sounds bilaterally  Heart: S1, S2 normal  Abdomen: abnormal findings:  soft nt  Extremities: edema trace  Neurologic: Mental status: alertness: awake      Patient Active Problem List:     Altered mental status     Essential hypertension     Low back pain     Acquired hypothyroidism     Panic disorder     Dystonia     Chronic kidney disease (CKD) stage G3a/A3, moderately decreased glomerular filtration rate (GFR) between 45-59 mL/min/1.73 square meter and albuminuria creatinine ratio greater than 300 mg/g (HCC)     Type 2 diabetes mellitus without complication ([facility] [address]     Ambulatory dysfunction     Non compliance w medication regimen     Hyperlipidemia     Closed fracture of distal end of fibula     Acute metabolic encephalopathy     SO (acute kidney injury) ([address]     Encephalopathy acute     Closed displaced fracture of shaft of second metacarpal bone of right hand    Renal US  FINDINGS:  Exam was markedly limited and had to be terminated early due to patient  combativeness.  The exam was unable to be completed.  Three images of the  right kidney demonstrate no definite hydronephrosis or increased cortical  echogenicity.      Impression:       Limited exam due to early termination secondary to patient combativeness. Limited view of the right kidney demonstrates no definite hydronephrosis. Please refer to dedicated CT performed on 05/01/2020 for evaluation of the  kidneys.  No hydronephrosis is noted on prior CT. Assessment and Plan:      IMP:  esrd from atn  CHF diastolic ef 32-33%  Dm2 controlled  Anemia  Hyponatremia/hyperkalemia  Met encephalopathy  Hypotension    Plan     1 for short term at least the dialysis will be needed. Doing hd today and will maintain mwf for now. I dw daughter POA from [place] and she was agreeable but wanted dw pt first and let me know. My concern is if will stay in chair for full treatment and behave.  If agree will order tunnel hd line and schedule outpt chair  2 o2 monitor and stbale  3 ssi  4 epo as need with hd  5 correct na and K with hd  6 monitor affect  7 bp low stable  Will follow             Carla Milner MD

## 2020-05-06 NOTE — PROGRESS NOTES
Initial, Last Name, Title): Jessy Avina RN   Incapacitated Nurse Education Completed: Yes     HBsAg ONLY:  Date Drawn: May 2, 2020       Results: Negative  HBsAb:  Date Drawn:  May 2, 2020       Results: Susceptible <10    Order     Citrate Bath  Ca+ (Calcium): 2.5 (05/06/20 0910)  K+ (Potassium): 2 (05/06/20 0910)  Bicarb: 35 (05/06/20 0910)  Na+ Modeling: Not Applicable  Dialyzer: F963  Dialysate Temperature (C):  36  Blood Flow Rate (BFR):  300   Dialysate Flow Rate (DFR):   600        Access to be Utilized   Access: Non-tunneled Catheter  Location: Internal Jugular  Side: Right   Needle gauge:  Not Applicable  + Bruit/Thrill: Not Applicable    First Use X-ray Verified: Not Applicable  OK to use line order: Not Applicable    Site Assessment:  Signs and Symptoms of Infection/Inflammation: None  If yes: Not Applicable  Dressing: Dry and Intact  Site Prep: Medical Aseptic Technique  Dressing Changed this Treatment: No  If yes, by whom: NA - not changed today  Date of Last Dressing Change: May 5, 2020  Antimicrobial Patch in place?: Yes  Blue Caps in place?: Yes   Gauze Dressing? : n/a  Non Dialysis Use?: No  Comment:    Flows: Good, Patent  If access problem, who was notified:     Pre and Post-Assessment  Patient Vitals for the past 8 hrs:   Level of Consciousness Heart Rhythm Respiratory Quality/Effort O2 Device Bilateral Breath Sounds Skin Color Skin Condition/Temp Abdomen Inspection Bowel Sounds (All Quadrants) Edema Comments Pain Level   05/06/20 0815 0 -- -- None (Room air) -- -- -- -- -- -- -- --   05/06/20 0823 0 -- -- -- -- -- -- Soft;Rounded -- None -- --   05/06/20 0900 0 Regular Unlabored None (Room air) Diminished Appropriate for ethnicity Dry; Warm Soft;Rounded Active None cconsent verified, pre treatment vitals 0   05/06/20 1230 0 Regular Unlabored None (Room air) Diminished Appropriate for ethnicity Dry; Warm Soft;Rounded Active None -- --   05/06/20 1300 0 -- -- None (Room air) -- -- -- -- -- -- -- -- Comments Access Visible   05/06/20 0910 200 ml/min 500 ml/hr 0 ml -40 mmHg 40 50 -- 600 tx initiated, md notified Yes   05/06/20 0915 300 ml/min 500 ml/hr 82 ml -80 mmHg 80 50 -- 600 lines secure Yes   05/06/20 0930 300 ml/min 500 ml/hr 177 ml -80 mmHg 80 50 -- 600 no distress Yes   05/06/20 0945 300 ml/min 500 ml/hr 208 ml -80 mmHg 80 50 -- 600 talking to RN Yes   05/06/20 1000 300 ml/min 500 ml/hr 315 ml -80 mmHg 80 50 -- 600 no complaints Yes   05/06/20 1015 300 ml/min 500 ml/hr 424 ml -80 mmHg 80 50 -- 600 pt anxious Yes   05/06/20 1030 300 ml/min 500 ml/hr 539 ml -80 mmHg 80 50 -- 600 called floor RN to give ativan Yes   05/06/20 1045 300 ml/min 500 ml/hr 675 ml -80 mmHg 90 40 13.6 600 bicarb changed Yes   05/06/20 1100 300 ml/min 500 ml/hr 809 ml -80 mmHg 90 40 -- 600 Floor RN gave ativan Yes   05/06/20 1115 200 ml/min 500 ml/hr 913 ml -80 mmHg 90 40 -- 600 resting quietly Yes   05/06/20 1130 300 ml/min 500 ml/hr 1043 ml -80 mmHg 90 40 -- 600 no change Yes   05/06/20 1145 300 ml/min 500 ml/hr 1163 ml -80 mmHg 90 40 -- 600 denies needs Yes   05/06/20 1200 300 ml/min 500 ml/hr 1249 ml -80 mmHg 90 40 -- 600 -- --   05/06/20 1215 -- -- 1375 ml -- -- -- -- -- tx ended Yes     Vital Signs  Patient Vitals for the past 8 hrs:   BP Temp Pulse Resp SpO2   05/06/20 0815 132/64 98.1 °F (36.7 °C) 64 16 98 %   05/06/20 0900 121/64 98.4 °F (36.9 °C) 65 16 97 %   05/06/20 0910 126/67 -- 65 -- --   05/06/20 0915 (!) 109/59 -- 64 -- --   05/06/20 0930 (!) 89/58 -- 77 -- --   05/06/20 0945 (!) 96/45 -- 79 -- --   05/06/20 1000 (!) 107/54 -- 78 -- --   05/06/20 1015 126/68 -- 72 -- 100 %   05/06/20 1030 113/64 -- 72 16 98 %   05/06/20 1045 109/65 -- 76 -- --   05/06/20 1100 116/64 -- 79 -- --   05/06/20 1115 116/64 -- 82 -- --   05/06/20 1130 115/60 -- 83 -- --   05/06/20 1135 -- -- -- -- 95 %   05/06/20 1145 102/62 -- 84 -- --   05/06/20 1200 (!) 103/57 -- 81 -- --   05/06/20 1215 119/71 -- 87 -- --   05/06/20 1230 128/65 98.2

## 2020-05-06 NOTE — PROGRESS NOTES
Hospitalist Progress Note      Name:  Franklyn Escalante /Age/Sex: 1940  (70 y.o. male)   MRN & CSN:  [id number] & [id number] Admission Date/Time: 2020  7:15 PM   Location:  [address] [place] [address] PCP: Amy Chávez, Phonitive - [address] Day: 6    ASSESSMENT & PLAN:   Franklyn Escalante is a 70 y.o.  male who presented to the hospital with a complaint of confusion. He was subsequently noted to have a creatinine of 10.1. Baseline creatinine is 2.1. He is not making urine. Creatinine has not improved. Patient to remain in the hospital.  He will probably end up having dialysis on outpatient basis. If so, he may need a tunneled HD catheter. #.  SO on CKD 3--Baseline creatinine 2.1. Creatinine admission 10.1. Right IJ catheter in place. Undergoing HD. Pereira in place. Creatinine remains at 10. No urine output.  -Daily chemistry panel  -Avoid NSAIDs/contrast  - Continue routine HD, managed by nephro    #. Toxic metabolic encephalopathy-- likely uremic encephalopathy given BUN of 83 and creatinine of 10.1. No longer confused. #.  DM2--A1c 7.4 on 20.  -Hypoglycemic protocol  -Low-dose sliding scale insulin    #. Constipation--no bowel movement in 4 days per patient account. -MiraLAX daily  -Docusate 100 mg twice daily    #. Anemia of CKD 3-- hemoglobin around baseline at 8. #.  Hyperlipidemia--on Lipitor      MEDICAL DECISION MAKING:  -Labs reviewed  -Imaging reviewed  -Level of risk moderate  Diet DIET GENERAL; Dysphagia Minced and Moist; Daily Fluid Restriction: 1800 ml; Low Potassium   DVT Prophylaxis [] Lovenox, [x]  Heparin, [] SCDs, [] Ambulation   GI Prophylaxis [] PPI,  [] H2 Blocker,  [] Carafate,  [] Diet/Tube Feeds   Code Status Full Code   Disposition  SNF   [facility] [] Low, [x] Moderate,[]  High     Chief complaint/Interval History/ROS     Chief Complaint: Encephalopathy, renal failure      INTERVAL HISTORY: No longer confused. Not making urine. ROS:    No chest pain.   No abdominal pain.  No nausea. No vomiting. No fevers or chills. Objective: Intake/Output Summary (Last 24 hours) at 5/6/2020 1034  Last data filed at 5/6/2020 0321  Gross per 24 hour   Intake 320 ml   Output 50 ml   Net 270 ml      Vitals:   Vitals:    05/06/20 1015   BP: 126/68   Pulse: 72   Resp:    Temp:    SpO2:      Physical Exam:   GEN: Awake male, pleasant. Nontoxic appearing. Answers questions appropriately. EYES: Sclera anicteric. HENT: Membranes moist.  No nasal discharge. Normocephalic atraumatic. NECK: Supple,  RESP: Clear to auscultation bilaterally. CV: Regular rate and rhythm. No murmur. No peripheral edema. GI: Soft abdomen. Nontender. Nondistended. : Pereira in place. MSK: No bony fractures. No gross deformities. SKIN: warm, dry, no rashes  NEURO: Cranial nerves appear grossly intact, normal speech, no focal deficit.   PSYCH: Awake, alert, oriented,  normal affect, normal mood    Medications:   Medications:    heparin (porcine)  2,600 Units Intracatheter Once in dialysis    LORazepam  0.25 mg Intravenous Once    polyethylene glycol  17 g Oral Daily    docusate sodium  100 mg Oral BID    morphine  15 mg Oral 2 times per day    sodium chloride flush  10 mL Intravenous 2 times per day    heparin (porcine)  5,000 Units Subcutaneous 3 times per day    aspirin  81 mg Oral Daily    atorvastatin  80 mg Oral Daily    insulin lispro  0-6 Units Subcutaneous TID WC    insulin lispro  0-3 Units Subcutaneous Nightly      Infusions:    sodium chloride 75 mL/hr at 05/06/20 0328    dextrose 100 mL/hr (05/03/20 0348)     PRN Meds: microfibrillar collagen, , PRN  sodium chloride flush, 10 mL, PRN  acetaminophen, 650 mg, Q6H PRN    Or  acetaminophen, 650 mg, Q6H PRN  promethazine, 12.5 mg, Q6H PRN    Or  ondansetron, 4 mg, Q6H PRN  acetaminophen, 500 mg, 4x Daily PRN  albuterol, 2.5 mg, Q6H PRN  glucose, 15 g, PRN  dextrose, 12.5 g, PRN  glucagon (rDNA), 1 mg, PRN  dextrose, 100 mL/hr, PRN      Electronically signed by Jenny Schulte MD on 5/6/2020 at 10:34 AM

## 2020-05-07 ENCOUNTER — APPOINTMENT (OUTPATIENT)
Dept: GENERAL RADIOLOGY | Age: 80
DRG: 673 | End: 2020-05-07
Payer: MEDICARE

## 2020-05-07 LAB
ALBUMIN SERPL-MCNC: 3 GM/DL (ref 3.4–5)
ALP BLD-CCNC: 803 IU/L (ref 40–128)
ALT SERPL-CCNC: 75 U/L (ref 10–40)
ANION GAP SERPL CALCULATED.3IONS-SCNC: 16 MMOL/L (ref 4–16)
APTT: 69.2 SECONDS (ref 25.1–37.1)
AST SERPL-CCNC: 72 IU/L (ref 15–37)
BILIRUB SERPL-MCNC: 1.1 MG/DL (ref 0–1)
BUN BLDV-MCNC: 45 MG/DL (ref 6–23)
CALCIUM SERPL-MCNC: 7.9 MG/DL (ref 8.3–10.6)
CHLORIDE BLD-SCNC: 94 MMOL/L (ref 99–110)
CO2: 25 MMOL/L (ref 21–32)
CREAT SERPL-MCNC: 7.4 MG/DL (ref 0.9–1.3)
GFR AFRICAN AMERICAN: 9 ML/MIN/1.73M2
GFR NON-AFRICAN AMERICAN: 7 ML/MIN/1.73M2
GLUCOSE BLD-MCNC: 159 MG/DL (ref 70–99)
GLUCOSE BLD-MCNC: 165 MG/DL (ref 70–99)
GLUCOSE BLD-MCNC: 196 MG/DL (ref 70–99)
GLUCOSE BLD-MCNC: 201 MG/DL (ref 70–99)
GLUCOSE BLD-MCNC: 216 MG/DL (ref 70–99)
HCT VFR BLD CALC: 26.4 % (ref 42–52)
HEMOGLOBIN: 8.1 GM/DL (ref 13.5–18)
INR BLD: 1.04 INDEX
MCH RBC QN AUTO: 28.4 PG (ref 27–31)
MCHC RBC AUTO-ENTMCNC: 30.7 % (ref 32–36)
MCV RBC AUTO: 92.6 FL (ref 78–100)
PDW BLD-RTO: 14 % (ref 11.7–14.9)
PLATELET # BLD: 186 K/CU MM (ref 140–440)
PMV BLD AUTO: 11.2 FL (ref 7.5–11.1)
POTASSIUM SERPL-SCNC: 4.6 MMOL/L (ref 3.5–5.1)
PROTHROMBIN TIME: 12.6 SECONDS (ref 11.7–14.5)
RBC # BLD: 2.85 M/CU MM (ref 4.6–6.2)
SARS-COV-2, NAAT: NOT DETECTED
SODIUM BLD-SCNC: 135 MMOL/L (ref 135–145)
SOURCE: NORMAL
TOTAL PROTEIN: 5.8 GM/DL (ref 6.4–8.2)
WBC # BLD: 16 K/CU MM (ref 4–10.5)

## 2020-05-07 PROCEDURE — 97535 SELF CARE MNGMENT TRAINING: CPT

## 2020-05-07 PROCEDURE — 1200000000 HC SEMI PRIVATE

## 2020-05-07 PROCEDURE — 6370000000 HC RX 637 (ALT 250 FOR IP): Performed by: INTERNAL MEDICINE

## 2020-05-07 PROCEDURE — 85730 THROMBOPLASTIN TIME PARTIAL: CPT

## 2020-05-07 PROCEDURE — 85027 COMPLETE CBC AUTOMATED: CPT

## 2020-05-07 PROCEDURE — 36415 COLL VENOUS BLD VENIPUNCTURE: CPT

## 2020-05-07 PROCEDURE — 97166 OT EVAL MOD COMPLEX 45 MIN: CPT

## 2020-05-07 PROCEDURE — 8E0ZXY6 ISOLATION: ICD-10-PCS | Performed by: FAMILY MEDICINE

## 2020-05-07 PROCEDURE — 6360000002 HC RX W HCPCS: Performed by: INTERNAL MEDICINE

## 2020-05-07 PROCEDURE — 85610 PROTHROMBIN TIME: CPT

## 2020-05-07 PROCEDURE — 6370000000 HC RX 637 (ALT 250 FOR IP): Performed by: HOSPITALIST

## 2020-05-07 PROCEDURE — 97530 THERAPEUTIC ACTIVITIES: CPT

## 2020-05-07 PROCEDURE — 82962 GLUCOSE BLOOD TEST: CPT

## 2020-05-07 PROCEDURE — U0002 COVID-19 LAB TEST NON-CDC: HCPCS

## 2020-05-07 PROCEDURE — 2580000003 HC RX 258: Performed by: INTERNAL MEDICINE

## 2020-05-07 PROCEDURE — 2700000000 HC OXYGEN THERAPY PER DAY

## 2020-05-07 PROCEDURE — 80053 COMPREHEN METABOLIC PANEL: CPT

## 2020-05-07 PROCEDURE — 74018 RADEX ABDOMEN 1 VIEW: CPT

## 2020-05-07 PROCEDURE — 94761 N-INVAS EAR/PLS OXIMETRY MLT: CPT

## 2020-05-07 PROCEDURE — 92526 ORAL FUNCTION THERAPY: CPT

## 2020-05-07 RX ORDER — TRAMADOL HYDROCHLORIDE 50 MG/1
50 TABLET ORAL EVERY 6 HOURS PRN
Status: DISCONTINUED | OUTPATIENT
Start: 2020-05-07 | End: 2020-05-11 | Stop reason: HOSPADM

## 2020-05-07 RX ORDER — SENNA PLUS 8.6 MG/1
1 TABLET ORAL NIGHTLY
Status: DISCONTINUED | OUTPATIENT
Start: 2020-05-07 | End: 2020-05-11 | Stop reason: HOSPADM

## 2020-05-07 RX ORDER — FUROSEMIDE 10 MG/ML
80 INJECTION INTRAMUSCULAR; INTRAVENOUS ONCE
Status: COMPLETED | OUTPATIENT
Start: 2020-05-07 | End: 2020-05-07

## 2020-05-07 RX ORDER — POLYETHYLENE GLYCOL 3350 17 G/17G
17 POWDER, FOR SOLUTION ORAL 2 TIMES DAILY
Status: DISCONTINUED | OUTPATIENT
Start: 2020-05-07 | End: 2020-05-11 | Stop reason: HOSPADM

## 2020-05-07 RX ADMIN — FUROSEMIDE 80 MG: 10 INJECTION, SOLUTION INTRAMUSCULAR; INTRAVENOUS at 17:20

## 2020-05-07 RX ADMIN — SODIUM CHLORIDE: 9 INJECTION, SOLUTION INTRAVENOUS at 18:50

## 2020-05-07 RX ADMIN — SODIUM CHLORIDE: 9 INJECTION, SOLUTION INTRAVENOUS at 05:56

## 2020-05-07 RX ADMIN — ONDANSETRON HYDROCHLORIDE 4 MG: 2 SOLUTION INTRAMUSCULAR; INTRAVENOUS at 05:55

## 2020-05-07 RX ADMIN — HEPARIN SODIUM 5000 UNITS: 5000 INJECTION INTRAVENOUS; SUBCUTANEOUS at 05:55

## 2020-05-07 RX ADMIN — INSULIN LISPRO 2 UNITS: 100 INJECTION, SOLUTION INTRAVENOUS; SUBCUTANEOUS at 17:19

## 2020-05-07 RX ADMIN — SODIUM CHLORIDE, PRESERVATIVE FREE 10 ML: 5 INJECTION INTRAVENOUS at 08:18

## 2020-05-07 RX ADMIN — ATORVASTATIN CALCIUM 80 MG: 40 TABLET, FILM COATED ORAL at 08:09

## 2020-05-07 RX ADMIN — POLYETHYLENE GLYCOL (3350) 17 G: 17 POWDER, FOR SOLUTION ORAL at 08:08

## 2020-05-07 RX ADMIN — Medication 8.6 MG: at 21:06

## 2020-05-07 RX ADMIN — MORPHINE SULFATE 15 MG: 15 TABLET, FILM COATED, EXTENDED RELEASE ORAL at 08:09

## 2020-05-07 RX ADMIN — POLYETHYLENE GLYCOL 3350 17 G: 17 POWDER, FOR SOLUTION ORAL at 21:06

## 2020-05-07 RX ADMIN — DOCUSATE SODIUM 100 MG: 100 CAPSULE, LIQUID FILLED ORAL at 08:09

## 2020-05-07 RX ADMIN — INSULIN LISPRO 2 UNITS: 100 INJECTION, SOLUTION INTRAVENOUS; SUBCUTANEOUS at 13:27

## 2020-05-07 RX ADMIN — ASPIRIN 81 MG 81 MG: 81 TABLET ORAL at 08:09

## 2020-05-07 RX ADMIN — TRAMADOL HYDROCHLORIDE 50 MG: 50 TABLET, FILM COATED ORAL at 21:06

## 2020-05-07 ASSESSMENT — PAIN DESCRIPTION - PROGRESSION: CLINICAL_PROGRESSION: NOT CHANGED

## 2020-05-07 ASSESSMENT — PAIN DESCRIPTION - FREQUENCY: FREQUENCY: INTERMITTENT

## 2020-05-07 ASSESSMENT — PAIN - FUNCTIONAL ASSESSMENT: PAIN_FUNCTIONAL_ASSESSMENT: PREVENTS OR INTERFERES SOME ACTIVE ACTIVITIES AND ADLS

## 2020-05-07 ASSESSMENT — PAIN SCALES - GENERAL
PAINLEVEL_OUTOF10: 3
PAINLEVEL_OUTOF10: 8

## 2020-05-07 ASSESSMENT — PAIN DESCRIPTION - PAIN TYPE: TYPE: CHRONIC PAIN

## 2020-05-07 ASSESSMENT — PAIN DESCRIPTION - ORIENTATION: ORIENTATION: MID;LOWER

## 2020-05-07 ASSESSMENT — PAIN DESCRIPTION - DESCRIPTORS: DESCRIPTORS: ACHING

## 2020-05-07 ASSESSMENT — PAIN DESCRIPTION - ONSET: ONSET: ON-GOING

## 2020-05-07 ASSESSMENT — PAIN DESCRIPTION - LOCATION: LOCATION: BACK

## 2020-05-07 NOTE — PROGRESS NOTES
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 · Strength R/L:  4-/5,   · Sensation: WFL  · Tone: Normal  · Coordination: WFL  · Perception: WNL    Activities of Daily Living (ADLs):  · Feeding: [place] Antonio  · Grooming: SBA (washing face/hands and brushing hair sitting upright)  · UB bathing: SBA (washing abdomen, trunk, BUE arms sitting upright in reclining chair)  · LB bathing: maxA (assistance to wash anne area/buttocks in stand due to decreased dynamic standing balance, unable to reach floor level to wash feet/lower legs, pt able to wash upper legs sitting upright in chair)  · UB dressing: SBA (Vance/donning gown sitting upright in chair)  · LB dressing: dependent/total (doffing/donning socks sitting EOB)  · Toileting: dependent/total (See LB bathing/dressing for details)    Cognitive and Psychosocial Functioning:  · Overall cognitive status: Decreased short term memory/problem solving, decreased safety awareness  · Affect: Confused       Mobility:  · Supine to sit:  [place]  · Transfers: maxA from EOB stand step to [facility] and then stand step from [facility] to reclining chair  · Sitting balance:  CGA. · Standing balance:  maxA. · Toilet/Shower Transfers: maxA from EOB to [facility]          PM TX session    Pt received sitting upright in chair requesting to return to bed. STS maxA from reclining chair up, stand pivot maxA. Stand to sit maxA. Sit to supine maxA. Scooting to [address] [place] [address] [address] [place]. Pt supine in bed, bed alarm on, call light at side, nursing notified.  Gait belt used throughout

## 2020-05-07 NOTE — PROGRESS NOTES
PRN  dextrose, 12.5 g, PRN  glucagon (rDNA), 1 mg, PRN  dextrose, 100 mL/hr, PRN      Electronically signed by Forrest Green MD on 5/7/2020 at 10:10 AM

## 2020-05-07 NOTE — PROGRESS NOTES
Nephrology Progress Note  5/7/2020 9:21 AM  Subjective:   Admit Date: 5/1/2020  PCP: Jessika Galvan MD     Interval History: Please see discussion below   With patient's daughter via facetime. Cayla Gross is threatening to pull out his temp HD line. Diet: DIET GENERAL; Dysphagia Minced and Moist; Daily Fluid Restriction: 1800 ml; Low Potassium    Data:   Scheduled Meds:   polyethylene glycol  17 g Oral Daily    docusate sodium  100 mg Oral BID    morphine  15 mg Oral 2 times per day    sodium chloride flush  10 mL Intravenous 2 times per day    heparin (porcine)  5,000 Units Subcutaneous 3 times per day    aspirin  81 mg Oral Daily    atorvastatin  80 mg Oral Daily    insulin lispro  0-6 Units Subcutaneous TID WC    insulin lispro  0-3 Units Subcutaneous Nightly     Continuous Infusions:   sodium chloride 75 mL/hr at 05/07/20 0556    dextrose 100 mL/hr (05/03/20 0348)     PRN Meds:microfibrillar collagen, sodium chloride flush, acetaminophen **OR** acetaminophen, promethazine **OR** ondansetron, acetaminophen, albuterol, glucose, dextrose, glucagon (rDNA), dextrose  I/O last 3 completed shifts: In: 2860.4 [P.O.:380; I.V.:1780.4; Other:200]  Out: 1530 [Urine:155]  No intake/output data recorded. Intake/Output Summary (Last 24 hours) at 5/7/2020 0921  Last data filed at 5/7/2020 0544  Gross per 24 hour   Intake 2860.41 ml   Output 1530 ml   Net 1330.41 ml     CBC:   Recent Labs     05/05/20  0358 05/07/20  0508   WBC 12.5* 16.0*   HGB 8.0* 8.1*    186     BMP:    Recent Labs     05/05/20  0358 05/06/20  0524 05/07/20  0508   * 127* 135   K 5.0 4.5 4.6   CL 84* 90* 94*   CO2 22 22 25   BUN 58* 78* 45*   CREATININE 8.6* 10.0* 7.4*   GLUCOSE 174* 196* 159*     Hepatic:   Recent Labs     05/07/20  0508   AST 72*   ALT 75*   BILITOT 1.1*   ALKPHOS 803*     Troponin: No results for input(s): TROPONINI in the last 72 hours. BNP: No results for input(s): BNP in the last 72 hours.   Lipids: No questions this morning. I conveyed a pressing concern   To her that Flavia Eaton is threatening to pull out his temporary dialysis   Catheter and I asked her to speak with him and explain that we would  Exchange his current catheter with one which would be placed along the   Upper chest wall which would likely be more tolerable to him. She spoke with   Flavia Eaton via my cellphone and explained the situation with exchanging his dialysis catheter for which he was agreeable. I also spoke with Flavia Eaton and described   The risk associated with pulling out the temp dialysis catheter and   Explained that we would exchange the temp catheter with a tunneled catheter  As quickly as feasibly possible. I also spoke with the patient's   Assigned nurse and explained the situation to her so she can monitor him. Electronically signed by ALENA Kinsey CNP                            Nephrology Attending Progress Note  5/7/2020 3:25 PM  Subjective:   Admit Date: 5/1/2020  PCP: Yuan Ramirez MD    Interval History: I have personally performed face to face diagnostic evaluation on this patient. I have personally reviewed pertinent labs and imaging and agree with the care plan above. My additional findings are as follows:  Pt needy but awake and state wants stay with dialysis and will make arrangements    Objective:   Vitals: BP (!) 115/58   Pulse 72   Temp 96.9 °F (36.1 °C) (Oral)   Resp 16   Ht 5' 7\" (1.702 m)   Wt 162 lb (73.5 kg)   SpO2 93%   BMI 25.37 kg/m²   Awake weak  Soft nt  No edema    Assessment and Plan:  IMP:  As stated above    Plan     1 poor uop and felt atn but not recovering.  Will place tunnel hd cathetr and set up for outpt dialysis and pt and daughter agree to plan  2 plan hd in am  3 will plan hd n trail and will need arrange transport for hd  4 bp low stable  5 monitor affect  6 ssi  7 monitor hb  Will follow and slow to recover  Na better and K tsasble           Electronically signed by Elizabeth Harris MD on 5/7/2020 at 3:25 PM

## 2020-05-08 ENCOUNTER — APPOINTMENT (OUTPATIENT)
Dept: INTERVENTIONAL RADIOLOGY/VASCULAR | Age: 80
DRG: 673 | End: 2020-05-08
Payer: MEDICARE

## 2020-05-08 LAB
ALBUMIN SERPL-MCNC: 2.8 GM/DL (ref 3.4–5)
ALP BLD-CCNC: 723 IU/L (ref 40–129)
ALT SERPL-CCNC: 61 U/L (ref 10–40)
ANION GAP SERPL CALCULATED.3IONS-SCNC: 13 MMOL/L (ref 4–16)
AST SERPL-CCNC: 52 IU/L (ref 15–37)
BILIRUB SERPL-MCNC: 0.8 MG/DL (ref 0–1)
BUN BLDV-MCNC: 56 MG/DL (ref 6–23)
CALCIUM SERPL-MCNC: 7.6 MG/DL (ref 8.3–10.6)
CHLORIDE BLD-SCNC: 98 MMOL/L (ref 99–110)
CO2: 23 MMOL/L (ref 21–32)
CREAT SERPL-MCNC: 8.4 MG/DL (ref 0.9–1.3)
GFR AFRICAN AMERICAN: 8 ML/MIN/1.73M2
GFR NON-AFRICAN AMERICAN: 6 ML/MIN/1.73M2
GLUCOSE BLD-MCNC: 141 MG/DL (ref 70–99)
GLUCOSE BLD-MCNC: 163 MG/DL (ref 70–99)
GLUCOSE BLD-MCNC: 180 MG/DL (ref 70–99)
GLUCOSE BLD-MCNC: 198 MG/DL (ref 70–99)
HCT VFR BLD CALC: 25.5 % (ref 42–52)
HEMOGLOBIN: 7.7 GM/DL (ref 13.5–18)
MCH RBC QN AUTO: 28.2 PG (ref 27–31)
MCHC RBC AUTO-ENTMCNC: 30.2 % (ref 32–36)
MCV RBC AUTO: 93.4 FL (ref 78–100)
PDW BLD-RTO: 14.6 % (ref 11.7–14.9)
PLATELET # BLD: 164 K/CU MM (ref 140–440)
PMV BLD AUTO: 11.4 FL (ref 7.5–11.1)
POTASSIUM SERPL-SCNC: 4.6 MMOL/L (ref 3.5–5.1)
RBC # BLD: 2.73 M/CU MM (ref 4.6–6.2)
SODIUM BLD-SCNC: 134 MMOL/L (ref 135–145)
TOTAL PROTEIN: 5.5 GM/DL (ref 6.4–8.2)
WBC # BLD: 15 K/CU MM (ref 4–10.5)

## 2020-05-08 PROCEDURE — 6360000002 HC RX W HCPCS: Performed by: INTERNAL MEDICINE

## 2020-05-08 PROCEDURE — 76000 FLUOROSCOPY <1 HR PHYS/QHP: CPT

## 2020-05-08 PROCEDURE — 2580000003 HC RX 258: Performed by: INTERNAL MEDICINE

## 2020-05-08 PROCEDURE — C1750 CATH, HEMODIALYSIS,LONG-TERM: HCPCS

## 2020-05-08 PROCEDURE — 94761 N-INVAS EAR/PLS OXIMETRY MLT: CPT

## 2020-05-08 PROCEDURE — 82962 GLUCOSE BLOOD TEST: CPT

## 2020-05-08 PROCEDURE — 0JH63XZ INSERTION OF TUNNELED VASCULAR ACCESS DEVICE INTO CHEST SUBCUTANEOUS TISSUE AND FASCIA, PERCUTANEOUS APPROACH: ICD-10-PCS | Performed by: RADIOLOGY

## 2020-05-08 PROCEDURE — 36558 INSERT TUNNELED CV CATH: CPT

## 2020-05-08 PROCEDURE — 02H633Z INSERTION OF INFUSION DEVICE INTO RIGHT ATRIUM, PERCUTANEOUS APPROACH: ICD-10-PCS | Performed by: RADIOLOGY

## 2020-05-08 PROCEDURE — 2580000003 HC RX 258: Performed by: RADIOLOGY

## 2020-05-08 PROCEDURE — C1894 INTRO/SHEATH, NON-LASER: HCPCS

## 2020-05-08 PROCEDURE — 1200000000 HC SEMI PRIVATE

## 2020-05-08 PROCEDURE — 2709999900 HC NON-CHARGEABLE SUPPLY

## 2020-05-08 PROCEDURE — 36415 COLL VENOUS BLD VENIPUNCTURE: CPT

## 2020-05-08 PROCEDURE — C1881 DIALYSIS ACCESS SYSTEM: HCPCS

## 2020-05-08 PROCEDURE — 6360000002 HC RX W HCPCS: Performed by: RADIOLOGY

## 2020-05-08 PROCEDURE — B5181ZA FLUOROSCOPY OF SUPERIOR VENA CAVA USING LOW OSMOLAR CONTRAST, GUIDANCE: ICD-10-PCS | Performed by: RADIOLOGY

## 2020-05-08 PROCEDURE — 76937 US GUIDE VASCULAR ACCESS: CPT

## 2020-05-08 PROCEDURE — 90935 HEMODIALYSIS ONE EVALUATION: CPT

## 2020-05-08 PROCEDURE — 80053 COMPREHEN METABOLIC PANEL: CPT

## 2020-05-08 PROCEDURE — 77001 FLUOROGUIDE FOR VEIN DEVICE: CPT

## 2020-05-08 PROCEDURE — 85027 COMPLETE CBC AUTOMATED: CPT

## 2020-05-08 PROCEDURE — 86704 HEP B CORE ANTIBODY TOTAL: CPT

## 2020-05-08 PROCEDURE — 6370000000 HC RX 637 (ALT 250 FOR IP): Performed by: INTERNAL MEDICINE

## 2020-05-08 RX ORDER — FENTANYL CITRATE 50 UG/ML
50 INJECTION, SOLUTION INTRAMUSCULAR; INTRAVENOUS ONCE
Status: COMPLETED | OUTPATIENT
Start: 2020-05-08 | End: 2020-05-08

## 2020-05-08 RX ORDER — MIDAZOLAM HYDROCHLORIDE 1 MG/ML
1 INJECTION INTRAMUSCULAR; INTRAVENOUS ONCE
Status: COMPLETED | OUTPATIENT
Start: 2020-05-08 | End: 2020-05-08

## 2020-05-08 RX ORDER — HEPARIN SODIUM 1000 [USP'U]/ML
3800 INJECTION, SOLUTION INTRAVENOUS; SUBCUTANEOUS
Status: COMPLETED | OUTPATIENT
Start: 2020-05-08 | End: 2020-05-08

## 2020-05-08 RX ORDER — MIDODRINE HYDROCHLORIDE 5 MG/1
5 TABLET ORAL
Status: ACTIVE | OUTPATIENT
Start: 2020-05-08 | End: 2020-05-08

## 2020-05-08 RX ORDER — 0.9 % SODIUM CHLORIDE 0.9 %
500 INTRAVENOUS SOLUTION INTRAVENOUS ONCE
Status: COMPLETED | OUTPATIENT
Start: 2020-05-08 | End: 2020-05-08

## 2020-05-08 RX ORDER — LORAZEPAM 2 MG/ML
0.25 INJECTION INTRAMUSCULAR ONCE
Status: COMPLETED | OUTPATIENT
Start: 2020-05-08 | End: 2020-05-08

## 2020-05-08 RX ADMIN — Medication 8.6 MG: at 23:00

## 2020-05-08 RX ADMIN — SODIUM CHLORIDE 500 ML: 9 INJECTION, SOLUTION INTRAVENOUS at 11:56

## 2020-05-08 RX ADMIN — SODIUM CHLORIDE: 9 INJECTION, SOLUTION INTRAVENOUS at 09:21

## 2020-05-08 RX ADMIN — MIDAZOLAM 1 MG: 1 INJECTION INTRAMUSCULAR; INTRAVENOUS at 11:57

## 2020-05-08 RX ADMIN — FENTANYL CITRATE 50 MCG: 50 INJECTION INTRAMUSCULAR; INTRAVENOUS at 12:15

## 2020-05-08 RX ADMIN — LORAZEPAM 0.25 MG: 2 INJECTION INTRAMUSCULAR; INTRAVENOUS at 14:02

## 2020-05-08 RX ADMIN — POLYETHYLENE GLYCOL 3350 17 G: 17 POWDER, FOR SOLUTION ORAL at 23:00

## 2020-05-08 RX ADMIN — HEPARIN SODIUM 5000 UNITS: 5000 INJECTION INTRAVENOUS; SUBCUTANEOUS at 23:01

## 2020-05-08 RX ADMIN — HEPARIN SODIUM 3800 UNITS: 1000 INJECTION, SOLUTION INTRAVENOUS; SUBCUTANEOUS at 15:45

## 2020-05-08 ASSESSMENT — PAIN SCALES - GENERAL
PAINLEVEL_OUTOF10: 0

## 2020-05-08 ASSESSMENT — PAIN DESCRIPTION - PROGRESSION
CLINICAL_PROGRESSION: NOT CHANGED

## 2020-05-08 NOTE — PROGRESS NOTES
Pt very anxious throughout most of treatment. Floor RN gave ativan with little effect. Treatment ended early d/t pt stating \"if you don't take me off I will have a panic attack\". MD notified and treatment ended. UF of 788ml. Both ports heparin locked and capped. Report given to RN. Patient Name: Jessica Perez  Patient : 1940  MRN: [id number]     Acct: [de-identified]  Date of Admission: 2020  Room/Bed: [id number]  Code Status:  Full Code  Allergies:    Allergies   Allergen Reactions    Amitriptyline Other (See Comments)     sedation  Other reaction(s): sedation  sedation    Codeine      States have used this with no issues  Other reaction(s): \"crazy\"  States have used this with no issues    Gabapentin      Neuro toxicity myoclonus     Suprax [Cefixime] Other (See Comments)     Pain     Diagnosis:    Patient Active Problem List   Diagnosis    Altered mental status    Essential hypertension    Low back pain    Acquired hypothyroidism    Panic disorder    Dystonia    Chronic kidney disease (CKD) stage G3a/A3, moderately decreased glomerular filtration rate (GFR) between 45-59 mL/min/1.73 square meter and albuminuria creatinine ratio greater than 300 mg/g (HCC)    Type 2 diabetes mellitus without complication (HCC)    Ambulatory dysfunction    Non compliance w medication regimen    Hyperlipidemia    Closed fracture of distal end of fibula    Acute metabolic encephalopathy    SO (acute kidney injury) ([facility] [address]    Encephalopathy acute    Closed displaced fracture of shaft of second metacarpal bone of right hand         Treatment:  Hemodilaysis 2:1  Priority: Routine  Location: Acute Room    Diabetic: No  NPO: No  Isolation Precautions: None     Consent for Treatment Verified: Yes  Blood Consent Verified: Not Applicable     Safety Verified: Identify (I), Consent (C), Equipment (E), HepB Status (B), Orders Complete (O), Access Verified (A) and Timeliness (T)  Time out performed prior to access at 1240 hours. Report Received from Primary RN at 1130 hours. Primary RN (First Initial, Last Name, Title): Beto Sigala RN  Incapacitated Nurse Education Completed: Not Applicable     HBsAg ONLY:  Date Drawn: May 2, 2020       Results: Negative  HBsAb:  Date Drawn:  May 2, 2020       Results: Susceptible <10    Order     Citrate Bath  Ca+ (Calcium): 2.5 (05/08/20 1250)  K+ (Potassium): 3 (05/08/20 1250)  Bicarb: 35 (05/08/20 1250)  Na+ Modeling: Not Applicable  Dialyzer: R835  Dialysate Temperature (C):  36  Blood Flow Rate (BFR):  300   Dialysate Flow Rate (DFR):   600        Access to be Utilized   Access: Tunneled Catheter  Location: Internal Jugular  Side: Right   Needle gauge:  Not Applicable  + Bruit/Thrill: Not Applicable    First Use X-ray Verified: Yes  OK to use line order: Yes    Site Assessment:  Signs and Symptoms of Infection/Inflammation: None  If yes: Not Applicable  Dressing: Dry and Intact  Site Prep: Medical Aseptic Technique  Dressing Changed this Treatment: No  If yes, by whom: Special Procedures  Date of Last Dressing Change: May 8, 2020   Antimicrobial Patch in place?: Yes  Red Alcohol Caps in place?: n/a  Gauze Dressing?: No  Non Dialysis Use?: No  Comment:    Flows: Good, Patent  If access problem, who was notified:     Pre and Post-Assessment  Patient Vitals for the past 8 hrs:   Level of Consciousness Oriented X Heart Rhythm Respiratory Quality/Effort O2 Device Bilateral Breath Sounds Skin Color Skin Condition/Temp Abdomen Inspection Bowel Sounds (All Quadrants) Edema Comments Pain Level Response to Pain Intervention   05/08/20 0945 0 -- -- -- None (Room air) -- -- -- -- -- -- -- -- --   05/08/20 1153 -- -- -- -- Nasal cannula -- -- -- -- -- -- -- -- --   05/08/20 1215 -- -- -- -- -- -- -- -- -- -- -- -- 0 --   05/08/20 1230 0 2 Regular Unlabored None (Room air) Diminished Appropriate for ethnicity Dry; Warm Soft;Rounded Active None consent verified, pre treatment vitals 0 -- 05/08/20 1324 0 -- -- Unlabored -- -- -- -- Soft;Rounded -- None -- -- --   05/08/20 1535 0 2 Regular Unlabored None (Room air) Diminished Appropriate for ethnicity Dry; Warm Soft;Rounded Active None -- 0 --   05/08/20 1607 -- -- -- -- -- -- -- -- -- -- -- -- -- Patient Satisfied     Labs  Recent Labs     05/07/20  0508 05/08/20  0542   WBC 16.0* 15.0*   HGB 8.1* 7.7*   HCT 26.4* 25.5*    164                                                                  Recent Labs     05/06/20  0524 05/07/20  0508 05/08/20  0542   * 135 134*   K 4.5 4.6 4.6   CL 90* 94* 98*   CO2 22 25 23   BUN 78* 45* 56*   CREATININE 10.0* 7.4* 8.4*   GLUCOSE 196* 159* 180*     IV Drips and Rate/Dose   dextrose 100 mL/hr (05/03/20 0348)      Safety - Before each treatment:   Dialysis Machine No.: [id number]  RO Machine No.: [id number]  Dialyzer Lot No.: [id number]  RO Machine Log Sheet Completed: Yes  Machine Alarm Self Test: Completed; Passed (05/08/20 1230)  Machine Autotest: Completed, Passed  Air Foam Detector: Tested, Proper Function, pH Reading  Extracorporeal Circuit Tested for Integrity: Yes  Machine Conductivity: 13.7  Manual Conductivity: 13.7  Machine Ph: 7.4  Bicarbonate Concentrate Lot No.: [id number]  Acid Concentrate Lot No.: [id number]  Manual Ph: 7.5  Bleach Test (Neg): Yes  Bath Temperature: 96.8 °F (36 °C)  Tubing Lot#: [id number]  Conductivity Meter Serial #: [id number]  All Connections Secure?: Yes  Venous Parameters Set?: Yes  Arterial Parameters Set?: Yes  Saline Line Double Clamped?: Yes  Air Foam Detector Engaged?: Yes  Machine Functioning Alarm Free?  Yes  Prime Given: 200ml    Chlorine Testing - Before each treatment and every 4 hours:   Treatment  Treatment Number: 1  Time On: 1250  Time Off: 1530  Treatment Goal: 0.5-1kg  Weight: 162 lb (73.5 kg) (05/07/20 0541)  1st check: less than 0.1 ppm at: 1215 hours  2nd check: less than 0.1 ppm at: 1600 hours  3rd check: Not Applicable  (if greater than 0.1 ppm, then check

## 2020-05-08 NOTE — PROGRESS NOTES
Physical Therapy  2 attempts made to see pt for PT eval. Pt off unit for procedure and then off unit for dialysis. Will check back tomorrow.

## 2020-05-08 NOTE — PROGRESS NOTES
Nutrition Assessment    Type and Reason for Visit: Initial(los 7)    Nutrition Recommendations:   · Advance diet as timely as able to Low Potassium, Dysphagia 3/thin liquids with strict aspiration precautions. Pt requires supervision and assistance with meals per SLP. · Begin renal oral nutrition supplement bid, between meals  · Please record all po intake, thanks    Nutrition Assessment: Poor intake dos on dysphagia diet with confusion, constipation, renal failure. NPO today for HD catheter exchange. Malnutrition Assessment:  · Malnutrition Status:  At risk for malnutrition    Nutrition Risk Level: High    Nutrient Needs:  · Estimated Daily Total Kcal: 2010-2345 (30-35 kcal/kg IBW)  · Estimated Daily Protein (g): 80-87 (1.2-1.3 g/kg IBW)  · Estimated Daily Total Fluid (ml/day): 0721-7610 (1 ml/kcal)    Nutrition Diagnosis:   · Problem: Inadequate oral intake  · Etiology: related to Increased demand for energy/nutrients     Signs and symptoms:  as evidenced by NPO status due to medical condition, Intake 0-25%, Diet history of poor intake, Weight loss greater than or equal to 10% in 6 months, Known losses from dialysis    Objective Information:  · Wound Type: Skin Tears  · Current Nutrition Therapies:  · Oral Diet Orders: NPO   · Oral Diet intake: NPO  · Oral Nutrition Supplement (ONS) Orders: None  · ONS intake: NPO  · Anthropometric Measures:  · Ht: 5' 7\" (170.2 cm)   · Current Body Wt: 162 lb (73.5 kg)  · Admission Body Wt: 161 lb 3.2 oz (73.1 kg)  · Usual Body Wt: 188 lb 6.4 oz (85.5 kg)(11/22/19)  · % Weight Change:    -14% over past 6 months  · Ideal Body Wt: 148 lb (67.1 kg), % Ideal Body 109  · BMI Classification: BMI 25.0 - 29.9 Overweight(25.4)    Nutrition Interventions:   Start oral diet, Start ONS  Continued Inpatient Monitoring, Education not appropriate at this time, Coordination of Care    Nutrition Evaluation:   · Evaluation: Goals set   · Goals:   Patient will meet at least  50% of

## 2020-05-08 NOTE — PROGRESS NOTES
Nephrology Progress Note  5/8/2020 6:48 AM  Subjective:   Admit Date: 5/1/2020  PCP: Jessika Galvan MD     Interval History:  Patient informed of HD catheter exchanged planned  For today; placement of tunneled catheter and removal of temp HD.   -anticipate HD today     Diet: Diet NPO, After Midnight    Data:   Scheduled Meds:   polyethylene glycol  17 g Oral BID    senna  1 tablet Oral Nightly    sodium chloride flush  10 mL Intravenous 2 times per day    heparin (porcine)  5,000 Units Subcutaneous 3 times per day    aspirin  81 mg Oral Daily    atorvastatin  80 mg Oral Daily    insulin lispro  0-6 Units Subcutaneous TID WC    insulin lispro  0-3 Units Subcutaneous Nightly     Continuous Infusions:   sodium chloride 75 mL/hr at 05/07/20 1850    dextrose 100 mL/hr (05/03/20 0348)     PRN Meds:traMADol, microfibrillar collagen, sodium chloride flush, acetaminophen **OR** acetaminophen, promethazine **OR** ondansetron, acetaminophen, albuterol, glucose, dextrose, glucagon (rDNA), dextrose  I/O last 3 completed shifts: In: 1070.4 [I.V.:870.4; Other:200]  Out: 125 [Urine:125]  No intake/output data recorded. No intake or output data in the 24 hours ending 05/08/20 0648  CBC:   Recent Labs     05/07/20  0508   WBC 16.0*   HGB 8.1*        BMP:    Recent Labs     05/06/20  0524 05/07/20  0508   * 135   K 4.5 4.6   CL 90* 94*   CO2 22 25   BUN 78* 45*   CREATININE 10.0* 7.4*   GLUCOSE 196* 159*     Hepatic:   Recent Labs     05/07/20  0508   AST 72*   ALT 75*   BILITOT 1.1*   ALKPHOS 803*     Troponin: No results for input(s): TROPONINI in the last 72 hours. BNP: No results for input(s): BNP in the last 72 hours. Lipids: No results for input(s): CHOL, HDL in the last 72 hours.     Invalid input(s): LDLCALCU  ABGs: No results found for: PHART, PO2ART, QMY1FNM  INR:   Recent Labs     05/07/20  0950   INR 1.04     Renal Labs  Albumin:    Lab Results   Component Value Date    LABALBU 3.0 05/07/2020

## 2020-05-08 NOTE — PROGRESS NOTES
Pt arrived to IR for placement of a tunneled dialysis catheter. Pt alert, verbalizes understanding of procedure. Informed consent placed in soft chart. Pt assisted to the table, prepped and draped to the right neck and chest. Dr Luke Jackson at bedside. US images obtained. 1156 Time Out  1157 Procedure started  1157 Versed 1 mg and fentanyl 50 mcg given by Munira Jackson uses US guidance to access the right IJ  Chronic dual lumen catheter placed by Dr Luke Jackson  4671 Sutures placed by Dr Luke Jackson, biopatch, sterile dressing applied. Ok to use catheter for dialysis. Pt to dialysis post procedure  Pt tolerated procedure well  Temp cath removed by Zachery Don RT, sterile dressing applied.  Pt tolerated procedure well    Report called to Katherine Finn and Vonda Dc

## 2020-05-08 NOTE — PROGRESS NOTES
today.    ROS:    No chest pain. No abdominal pain. No nausea. No vomiting. No fevers or chills. Objective:     No intake or output data in the 24 hours ending 05/08/20 0843   Vitals:   Vitals:    05/08/20 0635   BP: (!) 118/56   Pulse: 66   Resp: 16   Temp: 97.4 °F (36.3 °C)   SpO2: 98%     Physical Exam:   GEN: Awake male, nontoxic appearing. Answers questions appropriately. EYES: Sclera anicteric. HENT: Membranes moist.  No nasal discharge. Normocephalic atraumatic. NECK: Supple. Rt IJ vac cath present. RESP: Clear to auscultation bilaterally. CV: Regular rate and rhythm. No murmur. No peripheral edema. GI: Soft abdomen. Nontender. Nondistended. : Pereira catheter in place. MSK: No bony fractures. No gross deformities. SKIN: warm, dry, no rashes  NEURO:  normal speech, no focal deficit.   PSYCH: Awake, alert, oriented,  normal affect, normal mood    Medications:   Medications:    midodrine  5 mg Oral Once in dialysis    polyethylene glycol  17 g Oral BID    senna  1 tablet Oral Nightly    sodium chloride flush  10 mL Intravenous 2 times per day    heparin (porcine)  5,000 Units Subcutaneous 3 times per day    aspirin  81 mg Oral Daily    atorvastatin  80 mg Oral Daily    insulin lispro  0-6 Units Subcutaneous TID WC    insulin lispro  0-3 Units Subcutaneous Nightly      Infusions:    sodium chloride 75 mL/hr at 05/07/20 1850    dextrose 100 mL/hr (05/03/20 0348)     PRN Meds: traMADol, 50 mg, Q6H PRN  microfibrillar collagen, , PRN  sodium chloride flush, 10 mL, PRN  acetaminophen, 650 mg, Q6H PRN    Or  acetaminophen, 650 mg, Q6H PRN  promethazine, 12.5 mg, Q6H PRN    Or  ondansetron, 4 mg, Q6H PRN  acetaminophen, 500 mg, 4x Daily PRN  albuterol, 2.5 mg, Q6H PRN  glucose, 15 g, PRN  dextrose, 12.5 g, PRN  glucagon (rDNA), 1 mg, PRN  dextrose, 100 mL/hr, PRN      Electronically signed by Hughes Ganser, MD on 5/8/2020 at 8:43 AM

## 2020-05-09 PROBLEM — F41.1 GENERALIZED ANXIETY DISORDER: Chronic | Status: ACTIVE | Noted: 2020-05-09

## 2020-05-09 PROBLEM — F33.1 MODERATE EPISODE OF RECURRENT MAJOR DEPRESSIVE DISORDER (HCC): Chronic | Status: ACTIVE | Noted: 2020-05-09

## 2020-05-09 LAB
ALBUMIN SERPL-MCNC: 2.8 GM/DL (ref 3.4–5)
ALP BLD-CCNC: 728 IU/L (ref 40–128)
ALT SERPL-CCNC: 56 U/L (ref 10–40)
ANION GAP SERPL CALCULATED.3IONS-SCNC: 16 MMOL/L (ref 4–16)
AST SERPL-CCNC: 55 IU/L (ref 15–37)
BILIRUB SERPL-MCNC: 0.9 MG/DL (ref 0–1)
BUN BLDV-MCNC: 35 MG/DL (ref 6–23)
CALCIUM SERPL-MCNC: 7.8 MG/DL (ref 8.3–10.6)
CHLORIDE BLD-SCNC: 96 MMOL/L (ref 99–110)
CO2: 23 MMOL/L (ref 21–32)
CREAT SERPL-MCNC: 6.1 MG/DL (ref 0.9–1.3)
GFR AFRICAN AMERICAN: 11 ML/MIN/1.73M2
GFR NON-AFRICAN AMERICAN: 9 ML/MIN/1.73M2
GLUCOSE BLD-MCNC: 181 MG/DL (ref 70–99)
GLUCOSE BLD-MCNC: 184 MG/DL (ref 70–99)
GLUCOSE BLD-MCNC: 201 MG/DL (ref 70–99)
GLUCOSE BLD-MCNC: 206 MG/DL (ref 70–99)
GLUCOSE BLD-MCNC: 210 MG/DL (ref 70–99)
HCT VFR BLD CALC: 25.6 % (ref 42–52)
HEMOGLOBIN: 7.6 GM/DL (ref 13.5–18)
HEPATITIS B CORE TOTAL ANTIBODY: NEGATIVE
MCH RBC QN AUTO: 28.5 PG (ref 27–31)
MCHC RBC AUTO-ENTMCNC: 29.7 % (ref 32–36)
MCV RBC AUTO: 95.9 FL (ref 78–100)
PDW BLD-RTO: 14.3 % (ref 11.7–14.9)
PLATELET # BLD: 139 K/CU MM (ref 140–440)
PMV BLD AUTO: 11 FL (ref 7.5–11.1)
POTASSIUM SERPL-SCNC: 4.7 MMOL/L (ref 3.5–5.1)
RBC # BLD: 2.67 M/CU MM (ref 4.6–6.2)
SODIUM BLD-SCNC: 135 MMOL/L (ref 135–145)
TOTAL PROTEIN: 5.5 GM/DL (ref 6.4–8.2)
WBC # BLD: 17.2 K/CU MM (ref 4–10.5)

## 2020-05-09 PROCEDURE — 99221 1ST HOSP IP/OBS SF/LOW 40: CPT | Performed by: NURSE PRACTITIONER

## 2020-05-09 PROCEDURE — 6370000000 HC RX 637 (ALT 250 FOR IP): Performed by: INTERNAL MEDICINE

## 2020-05-09 PROCEDURE — 85027 COMPLETE CBC AUTOMATED: CPT

## 2020-05-09 PROCEDURE — 6370000000 HC RX 637 (ALT 250 FOR IP): Performed by: NURSE PRACTITIONER

## 2020-05-09 PROCEDURE — 94762 N-INVAS EAR/PLS OXIMTRY CONT: CPT

## 2020-05-09 PROCEDURE — 6360000002 HC RX W HCPCS: Performed by: INTERNAL MEDICINE

## 2020-05-09 PROCEDURE — 94761 N-INVAS EAR/PLS OXIMETRY MLT: CPT

## 2020-05-09 PROCEDURE — 80053 COMPREHEN METABOLIC PANEL: CPT

## 2020-05-09 PROCEDURE — 6370000000 HC RX 637 (ALT 250 FOR IP): Performed by: HOSPITALIST

## 2020-05-09 PROCEDURE — 84080 ASSAY ALKALINE PHOSPHATASES: CPT

## 2020-05-09 PROCEDURE — 97530 THERAPEUTIC ACTIVITIES: CPT

## 2020-05-09 PROCEDURE — 1200000000 HC SEMI PRIVATE

## 2020-05-09 PROCEDURE — 97162 PT EVAL MOD COMPLEX 30 MIN: CPT

## 2020-05-09 PROCEDURE — 84075 ASSAY ALKALINE PHOSPHATASE: CPT

## 2020-05-09 PROCEDURE — 82962 GLUCOSE BLOOD TEST: CPT

## 2020-05-09 RX ORDER — QUETIAPINE FUMARATE 25 MG/1
50 TABLET, FILM COATED ORAL NIGHTLY
Status: DISCONTINUED | OUTPATIENT
Start: 2020-05-09 | End: 2020-05-11 | Stop reason: HOSPADM

## 2020-05-09 RX ORDER — BUSPIRONE HYDROCHLORIDE 5 MG/1
5 TABLET ORAL 2 TIMES DAILY
Status: DISCONTINUED | OUTPATIENT
Start: 2020-05-09 | End: 2020-05-11 | Stop reason: HOSPADM

## 2020-05-09 RX ORDER — DONEPEZIL HYDROCHLORIDE 10 MG/1
10 TABLET, FILM COATED ORAL NIGHTLY
Status: DISCONTINUED | OUTPATIENT
Start: 2020-05-09 | End: 2020-05-11 | Stop reason: HOSPADM

## 2020-05-09 RX ORDER — ESCITALOPRAM OXALATE 10 MG/1
20 TABLET ORAL DAILY
Status: DISCONTINUED | OUTPATIENT
Start: 2020-05-09 | End: 2020-05-11 | Stop reason: HOSPADM

## 2020-05-09 RX ORDER — SODIUM PHOSPHATE, DIBASIC AND SODIUM PHOSPHATE, MONOBASIC 7; 19 G/133ML; G/133ML
1 ENEMA RECTAL ONCE
Status: COMPLETED | OUTPATIENT
Start: 2020-05-09 | End: 2020-05-09

## 2020-05-09 RX ORDER — TRAZODONE HYDROCHLORIDE 50 MG/1
50 TABLET ORAL NIGHTLY
Status: DISCONTINUED | OUTPATIENT
Start: 2020-05-09 | End: 2020-05-11 | Stop reason: HOSPADM

## 2020-05-09 RX ADMIN — POLYETHYLENE GLYCOL 3350 17 G: 17 POWDER, FOR SOLUTION ORAL at 10:35

## 2020-05-09 RX ADMIN — HEPARIN SODIUM 5000 UNITS: 5000 INJECTION INTRAVENOUS; SUBCUTANEOUS at 20:58

## 2020-05-09 RX ADMIN — QUETIAPINE FUMARATE 50 MG: 25 TABLET ORAL at 20:59

## 2020-05-09 RX ADMIN — BUSPIRONE HYDROCHLORIDE 5 MG: 5 TABLET ORAL at 20:59

## 2020-05-09 RX ADMIN — INSULIN LISPRO 1 UNITS: 100 INJECTION, SOLUTION INTRAVENOUS; SUBCUTANEOUS at 11:10

## 2020-05-09 RX ADMIN — TRAZODONE HYDROCHLORIDE 50 MG: 50 TABLET ORAL at 20:59

## 2020-05-09 RX ADMIN — HEPARIN SODIUM 5000 UNITS: 5000 INJECTION INTRAVENOUS; SUBCUTANEOUS at 06:41

## 2020-05-09 RX ADMIN — TRAMADOL HYDROCHLORIDE 50 MG: 50 TABLET, FILM COATED ORAL at 00:52

## 2020-05-09 RX ADMIN — POLYETHYLENE GLYCOL 3350 17 G: 17 POWDER, FOR SOLUTION ORAL at 20:58

## 2020-05-09 RX ADMIN — INSULIN LISPRO 2 UNITS: 100 INJECTION, SOLUTION INTRAVENOUS; SUBCUTANEOUS at 14:23

## 2020-05-09 RX ADMIN — SODIUM PHOSPHATE, DIBASIC AND SODIUM PHOSPHATE, MONOBASIC 1 ENEMA: 7; 19 ENEMA RECTAL at 06:54

## 2020-05-09 RX ADMIN — HEPARIN SODIUM 5000 UNITS: 5000 INJECTION INTRAVENOUS; SUBCUTANEOUS at 14:20

## 2020-05-09 RX ADMIN — Medication 8.6 MG: at 20:59

## 2020-05-09 RX ADMIN — ATORVASTATIN CALCIUM 80 MG: 40 TABLET, FILM COATED ORAL at 10:35

## 2020-05-09 RX ADMIN — ESCITALOPRAM OXALATE 20 MG: 10 TABLET ORAL at 14:20

## 2020-05-09 RX ADMIN — ASPIRIN 81 MG 81 MG: 81 TABLET ORAL at 10:35

## 2020-05-09 RX ADMIN — TRAMADOL HYDROCHLORIDE 50 MG: 50 TABLET, FILM COATED ORAL at 14:28

## 2020-05-09 RX ADMIN — BUSPIRONE HYDROCHLORIDE 5 MG: 5 TABLET ORAL at 14:20

## 2020-05-09 RX ADMIN — DONEPEZIL HYDROCHLORIDE 10 MG: 10 TABLET, FILM COATED ORAL at 20:59

## 2020-05-09 RX ADMIN — TRAMADOL HYDROCHLORIDE 50 MG: 50 TABLET, FILM COATED ORAL at 21:05

## 2020-05-09 ASSESSMENT — PAIN SCALES - GENERAL
PAINLEVEL_OUTOF10: 9
PAINLEVEL_OUTOF10: 8
PAINLEVEL_OUTOF10: 7
PAINLEVEL_OUTOF10: 8

## 2020-05-09 ASSESSMENT — PAIN DESCRIPTION - PAIN TYPE: TYPE: ACUTE PAIN

## 2020-05-09 ASSESSMENT — PAIN DESCRIPTION - LOCATION: LOCATION: NECK

## 2020-05-09 ASSESSMENT — PAIN DESCRIPTION - ORIENTATION: ORIENTATION: RIGHT

## 2020-05-09 NOTE — PROGRESS NOTES
Hospitalist Progress Note      Name:  Romie Kan /Age/Sex: 1940  (70 y.o. male)   MRN & CSN:  [id number] & [id number] Admission Date/Time: 2020  7:15 PM   Location:  9572/5349- PCP: Shweta Harvey, [address] Day: 9    ASSESSMENT & PLAN:   Romie Kan is a 70 y.o.  male who presented to the hospital with a complaint of confusion. He was subsequently noted to have a creatinine of 10.1. Baseline creatinine is 2.1. #.  SO on CKD 3--Baseline creatinine 2.1. Creatinine admission 10.1. Undergoing HD MWF. Pereira in place. No evidence of renal recovery yet. Remains oliguric. Tunneled dialysis catheter placed 20. Plan  -Daily chemistry panel  -Avoid NSAIDs/contrast  - Continue routine HD, managed by nephro. -- Pain control with PRN tramadol. #.  Toxic metabolic encephalopathy -  better. Was likely uremic encephalopathy given BUN of 83 and creatinine of 10.1 on admission. No longer confused. Now on HD. # Leukocytosis - stable. ? Reactive. No fever and he remains HDS. Will monitor for now. Off antibiotic for now. # Elevated ALP - unclear etiology ? Liver origin vs bone from immobilization or bone dz due to renal dz. Bilirubin is normal and ALT and AST are only mildly elevated. No liver or GB pathology seen on CT abdomen done on admission. Plan  Get ALP isoenzymes. #.  DM2--A1c 7.4 on 20.  -Hypoglycemic protocol  -Low-dose sliding scale insulin    #. Constipation-- MiraLAX BiD + Senna. #.  Anemia of CKD 3-- hemoglobin around baseline at 8. #.  Hyperlipidemia--on Lipitor      MEDICAL DECISION MAKING:  -Labs reviewed  -Imaging reviewed  -Level of risk moderate  Diet DIET DYSPHAGIA SOFT AND BITE-SIZED;  Dysphagia Soft and Bite-Sized; Daily Fluid Restriction: 1800 ml; Low Potassium   DVT Prophylaxis [] Lovenox, [x]  Heparin, [] SCDs, [] Ambulation   GI Prophylaxis [] PPI,  [] H2 Blocker,  [] Carafate,  [] Diet/Tube Feeds   Code Status Full Code Disposition Back to  SNF after outpatient HD set up , by Monday or Tuesday    MDM [] Low, [x] Moderate,[]  High     Chief complaint/Interval History/ROS     Chief Complaint: Encephalopathy, renal failure    INTERVAL HISTORY:  No renal recovery yet. Tunneled catheter placed for HD. He had a panic attack during dialysis     ROS:    No chest pain. No abdominal pain. No nausea. No vomiting. No fevers or chills. Objective: Intake/Output Summary (Last 24 hours) at 5/9/2020 1021  Last data filed at 5/8/2020 1530  Gross per 24 hour   Intake 500 ml   Output 1288 ml   Net -788 ml      Vitals:   Vitals:    05/09/20 1000   BP: 124/60   Pulse: 71   Resp: 16   Temp: 97.7 °F (36.5 °C)   SpO2: 95%     Physical Exam:   GEN: Awake male, nontoxic appearing. NECK: Supple. Rt IJ tuneled cath present. RESP: Clear to auscultation bilaterally. CV: Regular rate and rhythm. No murmur. No peripheral edema. GI: Soft abdomen. Nontender. Nondistended. : Pereira catheter in place. MSK: No bony fractures. No gross deformities. SKIN: warm, dry, no rashes  NEURO:  normal speech, no focal deficit.   PSYCH: Awake, alert, oriented,  normal affect, normal mood    Medications:   Medications:    polyethylene glycol  17 g Oral BID    senna  1 tablet Oral Nightly    sodium chloride flush  10 mL Intravenous 2 times per day    heparin (porcine)  5,000 Units Subcutaneous 3 times per day    aspirin  81 mg Oral Daily    atorvastatin  80 mg Oral Daily    insulin lispro  0-6 Units Subcutaneous TID     insulin lispro  0-3 Units Subcutaneous Nightly      Infusions:    dextrose 100 mL/hr (05/03/20 0348)     PRN Meds: traMADol, 50 mg, Q6H PRN  microfibrillar collagen, , PRN  sodium chloride flush, 10 mL, PRN  acetaminophen, 650 mg, Q6H PRN    Or  acetaminophen, 650 mg, Q6H PRN  promethazine, 12.5 mg, Q6H PRN    Or  ondansetron, 4 mg, Q6H PRN  acetaminophen, 500 mg, 4x Daily PRN  albuterol, 2.5 mg, Q6H PRN  glucose, 15 g,

## 2020-05-09 NOTE — CONSULTS
05/08/20  5:05 PM   Result Value Ref Range    POC Glucose 141 (H) 70 - 99 MG/DL   POCT Glucose    Collection Time: 05/08/20 10:52 PM   Result Value Ref Range    POC Glucose 198 (H) 70 - 99 MG/DL   Comprehensive Metabolic Panel    Collection Time: 05/09/20  3:14 AM   Result Value Ref Range    Sodium 135 135 - 145 MMOL/L    Potassium 4.7 3.5 - 5.1 MMOL/L    Chloride 96 (L) 99 - 110 mMol/L    CO2 23 21 - 32 MMOL/L    BUN 35 (H) 6 - 23 MG/DL    CREATININE 6.1 (H) 0.9 - 1.3 MG/DL    Glucose 184 (H) 70 - 99 MG/DL    Calcium 7.8 (L) 8.3 - 10.6 MG/DL    Alb 2.8 (L) 3.4 - 5.0 GM/DL    Total Protein 5.5 (L) 6.4 - 8.2 GM/DL    Total Bilirubin 0.9 0.0 - 1.0 MG/DL    ALT 56 (H) 10 - 40 U/L    AST 55 (H) 15 - 37 IU/L    Alkaline Phosphatase 728 (H) 40 - 128 IU/L    GFR Non- 9 (L) >60 mL/min/1.73m2    GFR  11 (L) >60 mL/min/1.73m2    Anion Gap 16 4 - 16   CBC    Collection Time: 05/09/20  3:14 AM   Result Value Ref Range    WBC 17.2 (H) 4.0 - 10.5 K/CU MM    RBC 2.67 (L) 4.6 - 6.2 M/CU MM    Hemoglobin 7.6 (L) 13.5 - 18.0 GM/DL    Hematocrit 25.6 (L) 42 - 52 %    MCV 95.9 78 - 100 FL    MCH 28.5 27 - 31 PG    MCHC 29.7 (L) 32.0 - 36.0 %    RDW 14.3 11.7 - 14.9 %    Platelets 381 (L) 038 - 440 K/CU MM    MPV 11.0 7.5 - 11.1 FL   POCT Glucose    Collection Time: 05/09/20 10:40 AM   Result Value Ref Range    POC Glucose 181 (H) 70 - 99 MG/DL   POCT Glucose    Collection Time: 05/09/20  2:18 PM   Result Value Ref Range    POC Glucose 210 (H) 70 - 99 MG/DL   POCT Glucose    Collection Time: 05/09/20  5:57 PM   Result Value Ref Range    POC Glucose 201 (H) 70 - 99 MG/DL       Review of Systems:  Reports of no current cardiovascular, respiratory, gastrointestinal, genitourinary, integumentary, neurological, muscuoskeletal, or immunological symptoms today. PSYCHIATRIC: See HPI above.     PSYCHIATRIC EXAMINATION / MENTAL STATUS EXAM    CONSTITUTIONAL:    Vitals:   Vitals:    05/09/20 1623   BP: (!) 114/56

## 2020-05-10 LAB
ALBUMIN SERPL-MCNC: 2.9 GM/DL (ref 3.4–5)
ALP BLD-CCNC: 666 IU/L (ref 40–128)
ALT SERPL-CCNC: 52 U/L (ref 10–40)
ANION GAP SERPL CALCULATED.3IONS-SCNC: 13 MMOL/L (ref 4–16)
AST SERPL-CCNC: 50 IU/L (ref 15–37)
BILIRUB SERPL-MCNC: 0.7 MG/DL (ref 0–1)
BUN BLDV-MCNC: 48 MG/DL (ref 6–23)
CALCIUM SERPL-MCNC: 7.7 MG/DL (ref 8.3–10.6)
CHLORIDE BLD-SCNC: 97 MMOL/L (ref 99–110)
CO2: 26 MMOL/L (ref 21–32)
CREAT SERPL-MCNC: 7.5 MG/DL (ref 0.9–1.3)
GFR AFRICAN AMERICAN: 9 ML/MIN/1.73M2
GFR NON-AFRICAN AMERICAN: 7 ML/MIN/1.73M2
GLUCOSE BLD-MCNC: 156 MG/DL (ref 70–99)
GLUCOSE BLD-MCNC: 187 MG/DL (ref 70–99)
GLUCOSE BLD-MCNC: 193 MG/DL (ref 70–99)
GLUCOSE BLD-MCNC: 194 MG/DL (ref 70–99)
GLUCOSE BLD-MCNC: 242 MG/DL (ref 70–99)
HCT VFR BLD CALC: 24.1 % (ref 42–52)
HEMOGLOBIN: 7.4 GM/DL (ref 13.5–18)
MCH RBC QN AUTO: 28.7 PG (ref 27–31)
MCHC RBC AUTO-ENTMCNC: 30.7 % (ref 32–36)
MCV RBC AUTO: 93.4 FL (ref 78–100)
PDW BLD-RTO: 14.4 % (ref 11.7–14.9)
PLATELET # BLD: 133 K/CU MM (ref 140–440)
PMV BLD AUTO: 11.1 FL (ref 7.5–11.1)
POTASSIUM SERPL-SCNC: 4.2 MMOL/L (ref 3.5–5.1)
RBC # BLD: 2.58 M/CU MM (ref 4.6–6.2)
SODIUM BLD-SCNC: 136 MMOL/L (ref 135–145)
TOTAL PROTEIN: 5.5 GM/DL (ref 6.4–8.2)
WBC # BLD: 12.9 K/CU MM (ref 4–10.5)

## 2020-05-10 PROCEDURE — 6370000000 HC RX 637 (ALT 250 FOR IP): Performed by: INTERNAL MEDICINE

## 2020-05-10 PROCEDURE — 6360000002 HC RX W HCPCS: Performed by: INTERNAL MEDICINE

## 2020-05-10 PROCEDURE — 1200000000 HC SEMI PRIVATE

## 2020-05-10 PROCEDURE — 94761 N-INVAS EAR/PLS OXIMETRY MLT: CPT

## 2020-05-10 PROCEDURE — 85027 COMPLETE CBC AUTOMATED: CPT

## 2020-05-10 PROCEDURE — 80053 COMPREHEN METABOLIC PANEL: CPT

## 2020-05-10 PROCEDURE — 82962 GLUCOSE BLOOD TEST: CPT

## 2020-05-10 RX ADMIN — HEPARIN SODIUM 5000 UNITS: 5000 INJECTION INTRAVENOUS; SUBCUTANEOUS at 13:58

## 2020-05-10 RX ADMIN — ASPIRIN 81 MG 81 MG: 81 TABLET ORAL at 09:20

## 2020-05-10 RX ADMIN — TRAZODONE HYDROCHLORIDE 50 MG: 50 TABLET ORAL at 22:27

## 2020-05-10 RX ADMIN — POLYETHYLENE GLYCOL 3350 17 G: 17 POWDER, FOR SOLUTION ORAL at 09:20

## 2020-05-10 RX ADMIN — Medication 8.6 MG: at 22:27

## 2020-05-10 RX ADMIN — INSULIN LISPRO 1 UNITS: 100 INJECTION, SOLUTION INTRAVENOUS; SUBCUTANEOUS at 09:18

## 2020-05-10 RX ADMIN — POLYETHYLENE GLYCOL 3350 17 G: 17 POWDER, FOR SOLUTION ORAL at 22:28

## 2020-05-10 RX ADMIN — INSULIN LISPRO 1 UNITS: 100 INJECTION, SOLUTION INTRAVENOUS; SUBCUTANEOUS at 18:25

## 2020-05-10 RX ADMIN — INSULIN LISPRO 1 UNITS: 100 INJECTION, SOLUTION INTRAVENOUS; SUBCUTANEOUS at 13:59

## 2020-05-10 RX ADMIN — ATORVASTATIN CALCIUM 80 MG: 40 TABLET, FILM COATED ORAL at 09:21

## 2020-05-10 RX ADMIN — BUSPIRONE HYDROCHLORIDE 5 MG: 5 TABLET ORAL at 22:27

## 2020-05-10 RX ADMIN — ESCITALOPRAM OXALATE 20 MG: 10 TABLET ORAL at 09:21

## 2020-05-10 RX ADMIN — BUSPIRONE HYDROCHLORIDE 5 MG: 5 TABLET ORAL at 09:21

## 2020-05-10 RX ADMIN — QUETIAPINE FUMARATE 50 MG: 25 TABLET ORAL at 22:28

## 2020-05-10 RX ADMIN — DONEPEZIL HYDROCHLORIDE 10 MG: 10 TABLET, FILM COATED ORAL at 22:28

## 2020-05-10 RX ADMIN — HEPARIN SODIUM 5000 UNITS: 5000 INJECTION INTRAVENOUS; SUBCUTANEOUS at 05:59

## 2020-05-10 RX ADMIN — HEPARIN SODIUM 5000 UNITS: 5000 INJECTION INTRAVENOUS; SUBCUTANEOUS at 22:34

## 2020-05-10 RX ADMIN — ACETAMINOPHEN 650 MG: 325 TABLET ORAL at 09:34

## 2020-05-10 ASSESSMENT — PAIN DESCRIPTION - ONSET
ONSET: ON-GOING
ONSET: ON-GOING

## 2020-05-10 ASSESSMENT — PAIN DESCRIPTION - PROGRESSION
CLINICAL_PROGRESSION: NOT CHANGED
CLINICAL_PROGRESSION: NOT CHANGED

## 2020-05-10 ASSESSMENT — PAIN DESCRIPTION - DESCRIPTORS
DESCRIPTORS: ACHING
DESCRIPTORS: ACHING

## 2020-05-10 ASSESSMENT — PAIN DESCRIPTION - FREQUENCY
FREQUENCY: CONTINUOUS
FREQUENCY: CONTINUOUS

## 2020-05-10 ASSESSMENT — PAIN DESCRIPTION - LOCATION
LOCATION: NECK
LOCATION: NECK

## 2020-05-10 ASSESSMENT — PAIN SCALES - GENERAL
PAINLEVEL_OUTOF10: 3
PAINLEVEL_OUTOF10: 0
PAINLEVEL_OUTOF10: 3
PAINLEVEL_OUTOF10: 0
PAINLEVEL_OUTOF10: 2

## 2020-05-10 ASSESSMENT — PAIN DESCRIPTION - PAIN TYPE
TYPE: ACUTE PAIN
TYPE: ACUTE PAIN

## 2020-05-10 ASSESSMENT — PAIN - FUNCTIONAL ASSESSMENT
PAIN_FUNCTIONAL_ASSESSMENT: ACTIVITIES ARE NOT PREVENTED
PAIN_FUNCTIONAL_ASSESSMENT: ACTIVITIES ARE NOT PREVENTED

## 2020-05-10 NOTE — CARE COORDINATION
Late Entry--Received return call from [facility] confirming their plan to place pt in their skilled nursing/care center when he arrive. They are aware of pt need for dialysis and transport and will coordinate. No definitive chair time has been received at this time.

## 2020-05-11 VITALS
BODY MASS INDEX: 26.09 KG/M2 | TEMPERATURE: 98.3 F | RESPIRATION RATE: 14 BRPM | HEART RATE: 81 BPM | SYSTOLIC BLOOD PRESSURE: 140 MMHG | DIASTOLIC BLOOD PRESSURE: 69 MMHG | OXYGEN SATURATION: 96 % | HEIGHT: 67 IN | WEIGHT: 166.23 LBS

## 2020-05-11 LAB
ALBUMIN SERPL-MCNC: 2.9 GM/DL (ref 3.4–5)
ALP BLD-CCNC: 647 IU/L (ref 40–129)
ALT SERPL-CCNC: 51 U/L (ref 10–40)
ANION GAP SERPL CALCULATED.3IONS-SCNC: 15 MMOL/L (ref 4–16)
AST SERPL-CCNC: 56 IU/L (ref 15–37)
BILIRUB SERPL-MCNC: 0.7 MG/DL (ref 0–1)
BILIRUBIN DIRECT: 0.4 MG/DL (ref 0–0.3)
BILIRUBIN, INDIRECT: 0.3 MG/DL (ref 0–0.7)
BUN BLDV-MCNC: 53 MG/DL (ref 6–23)
CALCIUM SERPL-MCNC: 7.8 MG/DL (ref 8.3–10.6)
CHLORIDE BLD-SCNC: 99 MMOL/L (ref 99–110)
CO2: 21 MMOL/L (ref 21–32)
CREAT SERPL-MCNC: 8.3 MG/DL (ref 0.9–1.3)
GFR AFRICAN AMERICAN: 8 ML/MIN/1.73M2
GFR NON-AFRICAN AMERICAN: 6 ML/MIN/1.73M2
GLUCOSE BLD-MCNC: 122 MG/DL (ref 70–99)
GLUCOSE BLD-MCNC: 140 MG/DL (ref 70–99)
GLUCOSE BLD-MCNC: 149 MG/DL (ref 70–99)
HCT VFR BLD CALC: 27.4 % (ref 42–52)
HEMOGLOBIN: 8.2 GM/DL (ref 13.5–18)
MCH RBC QN AUTO: 28.3 PG (ref 27–31)
MCHC RBC AUTO-ENTMCNC: 29.9 % (ref 32–36)
MCV RBC AUTO: 94.5 FL (ref 78–100)
PDW BLD-RTO: 14.5 % (ref 11.7–14.9)
PLATELET # BLD: 140 K/CU MM (ref 140–440)
PMV BLD AUTO: 11.5 FL (ref 7.5–11.1)
POTASSIUM SERPL-SCNC: 4.8 MMOL/L (ref 3.5–5.1)
RBC # BLD: 2.9 M/CU MM (ref 4.6–6.2)
SODIUM BLD-SCNC: 135 MMOL/L (ref 135–145)
TOTAL PROTEIN: 5.4 GM/DL (ref 6.4–8.2)
WBC # BLD: 12.8 K/CU MM (ref 4–10.5)

## 2020-05-11 PROCEDURE — 6360000002 HC RX W HCPCS: Performed by: INTERNAL MEDICINE

## 2020-05-11 PROCEDURE — 90935 HEMODIALYSIS ONE EVALUATION: CPT

## 2020-05-11 PROCEDURE — 6370000000 HC RX 637 (ALT 250 FOR IP): Performed by: INTERNAL MEDICINE

## 2020-05-11 PROCEDURE — 2580000003 HC RX 258: Performed by: INTERNAL MEDICINE

## 2020-05-11 PROCEDURE — 94761 N-INVAS EAR/PLS OXIMETRY MLT: CPT

## 2020-05-11 PROCEDURE — 82248 BILIRUBIN DIRECT: CPT

## 2020-05-11 PROCEDURE — 85027 COMPLETE CBC AUTOMATED: CPT

## 2020-05-11 PROCEDURE — 82962 GLUCOSE BLOOD TEST: CPT

## 2020-05-11 PROCEDURE — 80053 COMPREHEN METABOLIC PANEL: CPT

## 2020-05-11 RX ORDER — TRAMADOL HYDROCHLORIDE 50 MG/1
50 TABLET ORAL EVERY 6 HOURS PRN
Qty: 10 TABLET | Refills: 0 | Status: SHIPPED | OUTPATIENT
Start: 2020-05-11 | End: 2020-05-14

## 2020-05-11 RX ORDER — HEPARIN SODIUM 1000 [USP'U]/ML
3800 INJECTION, SOLUTION INTRAVENOUS; SUBCUTANEOUS
Status: COMPLETED | OUTPATIENT
Start: 2020-05-11 | End: 2020-05-11

## 2020-05-11 RX ADMIN — HEPARIN SODIUM 3800 UNITS: 1000 INJECTION, SOLUTION INTRAVENOUS; SUBCUTANEOUS at 12:51

## 2020-05-11 RX ADMIN — SODIUM CHLORIDE, PRESERVATIVE FREE 10 ML: 5 INJECTION INTRAVENOUS at 13:22

## 2020-05-11 RX ADMIN — POLYETHYLENE GLYCOL 3350 17 G: 17 POWDER, FOR SOLUTION ORAL at 13:21

## 2020-05-11 RX ADMIN — HEPARIN SODIUM 5000 UNITS: 5000 INJECTION INTRAVENOUS; SUBCUTANEOUS at 13:22

## 2020-05-11 RX ADMIN — TRAMADOL HYDROCHLORIDE 50 MG: 50 TABLET, FILM COATED ORAL at 05:52

## 2020-05-11 RX ADMIN — ASPIRIN 81 MG 81 MG: 81 TABLET ORAL at 13:22

## 2020-05-11 RX ADMIN — HEPARIN SODIUM 5000 UNITS: 5000 INJECTION INTRAVENOUS; SUBCUTANEOUS at 05:35

## 2020-05-11 RX ADMIN — ATORVASTATIN CALCIUM 80 MG: 40 TABLET, FILM COATED ORAL at 13:22

## 2020-05-11 RX ADMIN — ESCITALOPRAM OXALATE 20 MG: 10 TABLET ORAL at 13:21

## 2020-05-11 RX ADMIN — BUSPIRONE HYDROCHLORIDE 5 MG: 5 TABLET ORAL at 13:22

## 2020-05-11 ASSESSMENT — PAIN DESCRIPTION - PROGRESSION: CLINICAL_PROGRESSION: NOT CHANGED

## 2020-05-11 ASSESSMENT — PAIN DESCRIPTION - FREQUENCY: FREQUENCY: CONTINUOUS

## 2020-05-11 ASSESSMENT — PAIN - FUNCTIONAL ASSESSMENT: PAIN_FUNCTIONAL_ASSESSMENT: ACTIVITIES ARE NOT PREVENTED

## 2020-05-11 ASSESSMENT — PAIN SCALES - GENERAL
PAINLEVEL_OUTOF10: 0
PAINLEVEL_OUTOF10: 0
PAINLEVEL_OUTOF10: 7

## 2020-05-11 ASSESSMENT — PAIN DESCRIPTION - ONSET: ONSET: ON-GOING

## 2020-05-11 ASSESSMENT — PAIN DESCRIPTION - LOCATION: LOCATION: NECK

## 2020-05-11 ASSESSMENT — PAIN DESCRIPTION - DESCRIPTORS: DESCRIPTORS: ACHING

## 2020-05-11 ASSESSMENT — PAIN DESCRIPTION - PAIN TYPE: TYPE: ACUTE PAIN

## 2020-05-11 NOTE — PLAN OF CARE
Problem: SAFETY  Goal: Free from accidental physical injury  5/11/2020 0017 by Timothy Garza RN  Outcome: Ongoing  5/10/2020 1707 by Goran Hickman RN  Outcome: Ongoing     Problem: DAILY CARE  Goal: Daily care needs are met  5/11/2020 0017 by Timothy Garza RN  Outcome: Ongoing  5/10/2020 1707 by Goran Hickman RN  Outcome: Ongoing     Problem: PAIN  Goal: Patient's pain/discomfort is manageable  5/11/2020 0017 by Timothy Garza RN  Outcome: Ongoing  5/10/2020 1707 by Goran Hickman RN  Outcome: Ongoing     Problem: KNOWLEDGE DEFICIT  Goal: Patient/S.O. demonstrates understanding of disease process, treatment plan, medications, and discharge instructions.   5/11/2020 0017 by Timothy Garza RN  Outcome: Ongoing  5/10/2020 1707 by Goran Hickman RN  Outcome: Ongoing     Problem: DISCHARGE BARRIERS  Goal: Patient's continuum of care needs are met  5/11/2020 0017 by Timothy Garza RN  Outcome: Ongoing  5/10/2020 1707 by Goran Hickman RN  Outcome: Ongoing     Problem: Pain:  Goal: Pain level will decrease  Description: Pain level will decrease  5/11/2020 0017 by Timothy Garza RN  Outcome: Ongoing  5/10/2020 1707 by Goran Hickman RN  Outcome: Ongoing  Goal: Control of acute pain  Description: Control of acute pain  5/11/2020 0017 by Timothy Garza RN  Outcome: Ongoing  5/10/2020 1707 by Goran Hickman RN  Outcome: Ongoing  Goal: Control of chronic pain  Description: Control of chronic pain  5/11/2020 0017 by Timothy Garza RN  Outcome: Ongoing  5/10/2020 1707 by Goran Hickman RN  Outcome: Ongoing     Problem: Nutrition  Goal: Optimal nutrition therapy  5/11/2020 0017 by Timothy Garza RN  Outcome: Ongoing  5/10/2020 1707 by Goran Hickman RN  Outcome: Ongoing

## 2020-05-11 NOTE — PROGRESS NOTES
Pt tolerated 3 hr HD treatment well, removing 2 L. No complaints. Both ports heparin locked and capped per policy. Report given to RN. Patient Name: Yosvany Warner  Patient : 1940  MRN: [id number]     Acct: [de-identified]  Date of Admission: 2020  Room/Bed: [facility]6877-  Code Status:  Full Code  Allergies: Allergies   Allergen Reactions    Amitriptyline Other (See Comments)     sedation  Other reaction(s): sedation  sedation    Codeine      States have used this with no issues  Other reaction(s): \"crazy\"  States have used this with no issues    Gabapentin      Neuro toxicity myoclonus     Suprax [Cefixime] Other (See Comments)     Pain     Diagnosis:    Patient Active Problem List   Diagnosis    Altered mental status    Essential hypertension    Low back pain    Acquired hypothyroidism    Panic disorder    Dystonia    Chronic kidney disease (CKD) stage G3a/A3, moderately decreased glomerular filtration rate (GFR) between 45-59 mL/min/1.73 square meter and albuminuria creatinine ratio greater than 300 mg/g (HCC)    Type 2 diabetes mellitus without complication (HCC)    Ambulatory dysfunction    Non compliance w medication regimen    Hyperlipidemia    Closed fracture of distal end of fibula    Acute metabolic encephalopathy    SO (acute kidney injury) ([facility] [address]    Encephalopathy acute    Closed displaced fracture of shaft of second metacarpal bone of right hand    Moderate episode of recurrent major depressive disorder (HCC)    Generalized anxiety disorder         Treatment:  Hemodilaysis 2:1  Priority: Routine  Location: Acute Room    Diabetic: Yes  NPO: No  Isolation Precautions: None     Consent for Treatment Verified: Yes  Blood Consent Verified: Not Applicable     Safety Verified: Identify (I), Consent (C), Equipment (E), HepB Status (B), Orders Complete (O), Access Verified (A) and Timeliness (T)  Time out performed prior to access at 0920 hours.     Report Received from Primary RN at 0839 hours. Primary RN (First Initial, Last Name, Title): Sherman Maurer RN  Incapacitated Nurse Education Completed: Not Applicable     HBsAg ONLY:  Date Drawn: May 2, 2020       Results: Negative  HBsAb:  Date Drawn:  May 2, 2020       Results: Susceptible <10    Order     Citrate Bath  Ca+ (Calcium): 2.5 (05/11/20 0933)  K+ (Potassium): 3 (05/11/20 0933)  Bicarb: 35 (05/11/20 0933)  Na+ Modeling: Not Applicable  Dialyzer: X349  Dialysate Temperature (C):  36  Blood Flow Rate (BFR):250-300  Dialysate Flow Rate (DFR):   300-600  Access to be Utilized   Access:  Tunneled Catheter  Location: Internal Jugular  Side: Right   Needle gauge:  Not Applicable  + Bruit/Thrill: Not Applicable    First Use X-ray Verified: Not Applicable  OK to use line order: Not Applicable    Site Assessment:  Signs and Symptoms of Infection/Inflammation: None  If yes: Not Applicable  Dressing: Dry and Intact  Site Prep: Medical Aseptic Technique  Dressing Changed this Treatment: No  If yes, by whom: NA - not changed today  Date of Last Dressing Change: May 8, 2020  Antimicrobial Patch in place?: Yes  Blue Caps in place?: Yes  Gauze Dressing?: No  Non Dialysis Use?: No  Comment:    Flows: Good, Patent  If access problem, who was notified:     Pre and Post-Assessment  Patient Vitals for the past 8 hrs:   Level of Consciousness Oriented X Heart Rhythm Respiratory Quality/Effort O2 Device Bilateral Breath Sounds Skin Color Skin Condition/Temp Abdomen Inspection Bowel Sounds (All Quadrants) Edema Comments Pain Level Response to Pain Intervention   05/11/20 0545 0 -- -- -- None (Room air) -- -- -- -- -- -- -- 7 --   05/11/20 0621 -- -- -- -- -- -- -- -- -- -- -- -- -- Asleep with RR greater than 10   05/11/20 0623 -- -- -- -- -- -- -- -- -- -- -- -- -- None   05/11/20 0720 -- -- -- -- -- -- -- -- -- -- -- -- -- None   05/11/20 0819 -- -- -- -- -- -- -- -- -- -- -- -- -- None   05/11/20 0840 0 -- -- Unlabored -- -- Appropriate for ethnicity 1-2kg  Weight: 166 lb 3.6 oz (75.4 kg) (05/11/20 0925)  1st check: less than 0.1 ppm at: 0720 hours  2nd check: less than 0.1 ppm at: 1015 hours  3rd check: Not Applicable  (if greater than 0.1 ppm, then check every 30 minutes from secondary)    Access Flows and Pressures  Patient Vitals for the past 8 hrs:   Blood Flow Rate (ml/min) Ultrafiltration Rate (ml/hr) Ultrafiltration Total Arterial Pressure (mmHg) Venous Pressure (mmHg) TMP DFR Comments Access Visible   05/11/20 0933 200 ml/min 840 ml/hr 0 ml -50 mmHg 60 60 600 tx md tamia notified Yes   05/11/20 0945 300 ml/min 840 ml/hr 168 ml -110 mmHg 90 60 600 alert, calm Yes   05/11/20 1000 300 ml/min 840 ml/hr 381 ml -100 mmHg 100 70 600 lines secure Yes   05/11/20 1015 300 ml/min 840 ml/hr 565 ml -110 mmHg 100 70 600 gray at bedside Yes   05/11/20 1030 300 ml/min 840 ml/hr 764 ml -120 mmHg 100 70 600 denies needs Yes   05/11/20 1045 300 ml/min 840 ml/hr 958 ml -120 mmHg 100 70 600 no complaints Yes   05/11/20 1100 300 ml/min 840 ml/hr 1164 ml -110 mmHg 100 70 600 resting quietly Yes   05/11/20 1115 300 ml/min 840 ml/hr 1366 ml -120 mmHg 100 70 600 eyes closed Yes   05/11/20 1130 300 ml/min 840 ml/hr 1569 ml -120 mmHg 100 70 600 resp even unlabored Yes   05/11/20 1145 300 ml/min 840 ml/hr 1787 ml -120 mmHg 100 70 600 no change Yes   05/11/20 1200 300 ml/min 840 ml/hr 2009 ml -120 mmHg 100 70 600 lines secure Yes   05/11/20 1215 300 ml/min 840 ml/hr 2215 ml -140 mmHg 100 70 600 talking to RN Yes   05/11/20 1233 -- -- 2500 ml -- -- -- -- tx ended Yes     Vital Signs  Patient Vitals for the past 8 hrs:   BP Temp Pulse Resp SpO2 Weight Weight Method Percent Weight Change   05/11/20 0538 -- -- -- -- -- 163 lb 5.8 oz (74.1 kg) -- 0   05/11/20 0545 (!) 151/67 96.7 °F (35.9 °C) 58 12 94 % -- -- --   05/11/20 0925 (!) 140/77 98.4 °F (36.9 °C) 74 14 94 % 166 lb 3.6 oz (75.4 kg) Bed scale 1.75   05/11/20 0933 139/66 -- 69 -- -- -- -- --   05/11/20 0945 (!) 105/58 -- Tools: Explanation   Response to Education: Verbalized Understanding     Electronically signed by Carlos Joseph RN on 5/11/2020 at 1:23 PM

## 2020-05-11 NOTE — DISCHARGE SUMMARY
pelvis was performed without the administration of intravenous contrast. Multiplanar reformatted images are provided for review. Dose modulation, iterative reconstruction, and/or weight based adjustment of the mA/kV was utilized to reduce the radiation dose to as low as reasonably achievable. COMPARISON: None. HISTORY: ORDERING SYSTEM PROVIDED HISTORY: altered mental status, elevated serum creatanine, concern for obstructive process TECHNOLOGIST PROVIDED HISTORY: Reason for exam:->altered mental status, elevated serum creatanine, concern for obstructive process Additional Contrast?->None Reason for Exam: altered mental status, elevated serum creatanine, concern for obstructive process Acuity: Acute Type of Exam: Initial Additional signs and symptoms: [address] brought pt to ED from [facility] facility for all over body twitching and altered mental status Relevant Medical/Surgical History: hx of hernia repair FINDINGS: Lower Chest: The lung bases are clear. Organs: There is splenomegaly. The liver, gallbladder, and pancreas are grossly within normal limits for a noncontrast study. No adrenal masses are identified and there is no hydronephrosis. There is symmetric stranding of fat around the kidneys. A left renal cortical cyst is noted. There is no hydronephrosis and no renal stones are visualized. GI/Bowel: There is no bowel obstruction, free-air, or free fluid. The stomach and duodenal sweep are grossly within normal limits. The appendix is not identified. Pelvis: No acute abnormality detected. Peritoneum/Retroperitoneum: Vascular calcifications are present. No pathologically enlarged lymph nodes are visualized. Bones/Soft Tissues: There are degenerative changes of the spine. No acute abdominopelvic abnormality detected.      Xr Abdomen (kub) (single Ap View)    Result Date: 5/7/2020  EXAMINATION: ONE SUPINE XRAY VIEW(S) OF THE ABDOMEN 5/7/2020 5:45 am COMPARISON: CT abdomen and pelvis on May 1, venotomy site was closed with 3 0 Vicryl suture and Dermabond. The catheter was sutured to the skin. The catheter was locked with heparinized saline. The patient tolerated the procedure well and there were no immediate complications. The patient's pre-existing right internal jugular vein triple-lumen 12.5 [place] 16 cm Mahurkar temporary dialysis catheter was then removed at the end of the procedure. FINDINGS: Fluoroscopic image demonstrates the tip of the catheter in the right atrium. 4 sonographic images were obtained. The right internal jugular vein is widely patent, normally compressible, and without thrombus. There is a right internal jugular vein  temporary dialysis catheter present. Successful ultrasound and fluoroscopy guided tunneled dialysis catheter placement . Ir Nontunneled Vascular Catheter > 5 Years    Result Date: 5/4/2020  PROCEDURE: ULTRASOUND GUIDED VASCULAR ACCESS. PLACEMENT OF A NON-TUNNELED CATHETER. 5/2/2020. HISTORY: ORDERING SYSTEM PROVIDED HISTORY: Renal insufficiency TECHNOLOGIST PROVIDED HISTORY: Reason for exam:->temp cath SEDATION: None FLUOROSCOPY DOSE AND TYPE OR TIME AND EXPOSURES: None TECHNIQUE: Informed consent was obtained after a detailed explanation of the procedure including risks, benefits, and alternatives. Universal protocol was observed. The right neck and chest were prepped and draped in sterile fashion using maximum sterile barrier technique. Local anesthesia was achieved with lidocaine. A micropuncture needle was used to access the right internal jugular vein using ultrasound guidance. An ultrasound image demonstrating patency of the vein with needle tip located within it. An image was obtained and stored in PACs. A 0.035 guidewire was used to place a 12.5 [place] 16 cm Mahurkar triple lumen temporary dialysis catheter  after fascial tract dilation. The catheter flushed easily and there was a good blood return. The catheter was sutured to the skin.   The tablet  Commonly known as:  DESYREL     Vitamin D3 48 MCG (2000 UT) Caps        STOP taking these medications    acetaminophen 500 MG tablet  Commonly known as:  TYLENOL     Benadryl Allergy 25 MG tablet  Generic drug:  diphenhydrAMINE     cetirizine 10 MG tablet  Commonly known as:  ZYRTEC     gabapentin 300 MG capsule  Commonly known as:  NEURONTIN     gabapentin 400 MG capsule  Commonly known as:  NEURONTIN     insulin glargine 100 UNIT/ML injection vial  Commonly known as:  LANTUS     loperamide 2 MG capsule  Commonly known as:  IMODIUM     midodrine 5 MG tablet  Commonly known as:  PROAMATINE     morphine 15 MG extended release tablet  Commonly known as:  MS CONTIN           Where to Get Your Medications      You can get these medications from any pharmacy    Bring a paper prescription for each of these medications  · traMADol 50 MG tablet     Information about where to get these medications is not yet available    Ask your nurse or doctor about these medications  · insulin lispro 100 UNIT/ML injection vial  · insulin lispro 100 UNIT/ML injection vial  · microfibrillar collagen pad            Code Status: Full Code     Consults:   IP CONSULT TO NEPHROLOGY  IP CONSULT TO HOSPITALIST  IP CONSULT TO INTERVENTIONAL RADIOLOGY  IP CONSULT TO INTERVENTIONAL RADIOLOGY  IP CONSULT TO CASE MANAGEMENT  IP CONSULT TO PSYCHIATRY    Diet: diabetic diet, dysphagia    Activity: activity as tolerated   Work:    Discharged Condition: fair    Prognosis: Fair - Good    Disposition: SNF      Follow-up with   1. PCP within   5-7    Days    Follow up labs: bmp in 1 week       Discharge Physician Signed: Shira Nino M.D. The patient was seen and examined on day of discharge and this discharge summary is in conjunction with any daily progress note from day of discharge.   Time spent on discharge in the examination, evaluation, counseling and review of medications and discharge plan: 34 minutes

## 2020-05-11 NOTE — PROGRESS NOTES
Speech Language Pathology  Darrick Oakesfelicia Staples  1940  [id number]      Attempted to see Romina Fernandez for a bedside swallowing treatment and diet tolerance monitoring 05/11/20. Deferred assessment- pt is currently in dialysis. ST will re-attempt when pt is better able to participate in PO trials.     Bouchra Olivo 89, CCC-SLP, 5/11/2020

## 2020-05-11 NOTE — PROGRESS NOTES
Nephrology Progress Note  5/11/2020 9:22 AM  Subjective:   Admit Date: 5/1/2020  PCP: Larissa Sutton MD     Interval History: patient heading down for dialysis this morning.    -please see discussion of bottom of note in regard to plan of care and discharge planning. Diet: DIET DYSPHAGIA SOFT AND BITE-SIZED; Dysphagia Soft and Bite-Sized; Daily Fluid Restriction: 1800 ml; Low Potassium    Data:   Scheduled Meds:   busPIRone  5 mg Oral BID    QUEtiapine  50 mg Oral Nightly    escitalopram  20 mg Oral Daily    traZODone  50 mg Oral Nightly    donepezil  10 mg Oral Nightly    polyethylene glycol  17 g Oral BID    senna  1 tablet Oral Nightly    sodium chloride flush  10 mL Intravenous 2 times per day    heparin (porcine)  5,000 Units Subcutaneous 3 times per day    aspirin  81 mg Oral Daily    atorvastatin  80 mg Oral Daily    insulin lispro  0-6 Units Subcutaneous TID WC    insulin lispro  0-3 Units Subcutaneous Nightly     Continuous Infusions:   dextrose 100 mL/hr (05/03/20 0348)     PRN Meds:traMADol, microfibrillar collagen, sodium chloride flush, acetaminophen **OR** acetaminophen, promethazine **OR** ondansetron, acetaminophen, albuterol, glucose, dextrose, glucagon (rDNA), dextrose  I/O last 3 completed shifts: In: 65 [P.O.:680]  Out: 200 [Urine:200]  No intake/output data recorded.     Intake/Output Summary (Last 24 hours) at 5/11/2020 0922  Last data filed at 5/11/2020 0539  Gross per 24 hour   Intake 440 ml   Output --   Net 440 ml     CBC:   Recent Labs     05/09/20  0314 05/10/20  0457 05/11/20  0357   WBC 17.2* 12.9* 12.8*   HGB 7.6* 7.4* 8.2*   * 133* 140     BMP:    Recent Labs     05/09/20  0314 05/10/20  0457    136   K 4.7 4.2   CL 96* 97*   CO2 23 26   BUN 35* 48*   CREATININE 6.1* 7.5*   GLUCOSE 184* 242*     Hepatic:   Recent Labs     05/09/20  0314 05/10/20  0457   AST 55* 50*   ALT 56* 52*   BILITOT 0.9 0.7   ALKPHOS 728* 666*     Troponin: No results for input(s): TROPONINI in the last 72 hours. BNP: No results for input(s): BNP in the last 72 hours. Lipids: No results for input(s): CHOL, HDL in the last 72 hours. Invalid input(s): LDLCALCU  ABGs: No results found for: PHART, PO2ART, XBC5HBA  INR: No results for input(s): INR in the last 72 hours. Renal Labs  Albumin:    Lab Results   Component Value Date    LABALBU 2.9 05/10/2020    LABALBU 100 06/01/2018     Calcium:    Lab Results   Component Value Date    CALCIUM 7.7 05/10/2020     Phosphorus:    Lab Results   Component Value Date    PHOS 5.5 05/04/2020     U/A:    Lab Results   Component Value Date    NITRU NEGATIVE 05/01/2020    COLORU ELIDA 05/01/2020    WBCUA <1 05/01/2020    RBCUA 1 05/01/2020    MUCUS RARE 06/01/2018    TRICHOMONAS NONE SEEN 05/01/2020    BACTERIA NEGATIVE 05/01/2020    CLARITYU CLEAR 05/01/2020    SPECGRAV 1.015 05/01/2020    UROBILINOGEN NORMAL 05/01/2020    BILIRUBINUR NEGATIVE 05/01/2020    BLOODU SMALL 05/01/2020    KETUA NEGATIVE 05/01/2020     ABG:  No results found for: PHART, MUU9YTN, PO2ART, MYO4OXT, BEART, THGBART, TAI9COJ, C1MXIPBI  HgBA1c:    Lab Results   Component Value Date    LABA1C 7.4 05/02/2020     Microalbumen/Creatinine ratio:  No components found for: RUCREAT    Objective:   Vitals: BP (!) 151/67   Pulse 58   Temp 96.7 °F (35.9 °C) (Oral)   Resp 12   Ht 5' 7\" (1.702 m)   Wt 163 lb 5.8 oz (74.1 kg)   SpO2 94%   BMI 25.59 kg/m²      General appearance:  awake and verbally interactive   HEENT: Head: normocephalic, atraumatic. Neck: supple, symmetrical, trachea midline  Cardiovascular: normal S1 and S2  Pulmonary: diminished lung sounds bilaterally   Abdomen:  soft / non-tender   Extremities: Trace edema to bilateral lower legs     Tunneled Dialysis Catheter: (anatomical right upper chest wall):   no visible signs of infection to site.       Patient Active Problem List:     Altered mental status     Essential hypertension     Low back pain     Acquired

## 2020-05-13 LAB
ALK PHOS OTHER CALC: 0 U/L
ALKALINE PHOSPHATASE BONE FRACTION: 36 U/L (ref 0–55)
ALKALINE PHOSPHATASE LIVER FRACTION: 685 U/L (ref 0–94)
ALP BLD-CCNC: 721 U/L (ref 40–120)

## 2020-05-26 PROBLEM — N18.6 ESRD (END STAGE RENAL DISEASE) (HCC): Status: ACTIVE | Noted: 2020-05-26

## 2020-05-29 NOTE — H&P
Date:2020  Name:Raghav Garibay   :1940   ZY#:[id number]    SEX:male   Referring Physician:  Tabitha Rosario  Primary Physician:   Keisha  Chief Complaint:  Needs tunneled dialysis catheter  History of Present Illness:   Needs tunneled dialysis catheter    HISTORY AND PHYSICAL  SO (acute kidney injury) ([facility] [N17.9]  Altered mental status, unspecified altered mental status type [R41.82]    Past Medical History:  Past Medical History:   Diagnosis Date    Anemia     Carotid stenosis     hx per old chart    CKD (chronic kidney disease)     stage 3 hx per old chart    Depression     Diabetes ([facility]     Dystonia per pt    Fracture     per old chart hx fx right hand metacarpal-(2019) scheduled for surgery 10/4/2019    GERD (gastroesophageal reflux disease)     Hyperlipidemia     Hypertension     Hypothyroidism     Metabolic encephalopathy     dx with admission 2018    Neuropathy     Non-smoker per pt    Poor historian     [facility]     hx per old chart    Vitamin D deficiency     hx per old chart       Past Surgical History:  Past Surgical History:   Procedure Laterality Date    ANKLE SURGERY Left 2016    [facility]   [place] BACK [facility]ERY      Cervical 3    COLONOSCOPY      ENDOSCOPY, COLON, DIAGNOSTIC      EYE SURGERY  2019    Left cataract surgery    FRACTURE SURGERY      Right Hand    HAND SURGERY Right 10/4/2019    HAND OPEN REDUCTION INTERNAL FIXATION RIGHT SECOND METACARPAL performed by Monica Armenta DO at [address] Right     Inguinal Hernia    TONSILLECTOMY         Social History:  Social History     Socioeconomic History    Marital status:      Spouse name: Not on file    Number of children: Not on file    Years of education: Not on file    Highest education level: Not on file   Occupational History    Not on file   Social Needs    Financial resource strain: Not on file    Food insecurity     Worry: Not on file HEMATOCRIT, PLATELETS, POTASSIUM, CA, PT, CK1, TROP  INR @LABR24(INR)@    Physical Exam:  GENERAL:Well developed, well nourished in NAD  NECK: Neck exam - No JVD,HJR or carotid bruit, no thyromegaly   RESPIRATORY:Clear to auscultation  HEART:RRR,no murmer, gallop or friction rub  ABDOMEN: Bowel sounds present, no tenderness to palpation. No organomegaly  EXTREMITIES: no edema or cyanosis  VASCULAR:  no ischemic changes  SKIN: no erythema, rubor or lesions noted  NEURO/PSYCH:Alert and oriented    EKG:    Imaging:    Chest: CTA    Heart: S1, S1    Impression:  Principal Problem:    SO (acute kidney injury) ([facility]  Active Problems: Moderate episode of recurrent major depressive disorder (HCC)    Generalized anxiety disorder  Resolved Problems:    * No resolved hospital problems. *      Needs tunneled dialysis catheter    Mallampati Score 2  ASA class 2    PLAN OF CARE/PLANNED PROCEDURE    No admission procedures for hospital encounter.      Tunneled dialysis catheter

## 2020-07-11 PROBLEM — Z99.2 ESRD ON HEMODIALYSIS (HCC): Status: ACTIVE | Noted: 2020-07-11

## 2020-07-11 PROBLEM — N18.6 ESRD ON HEMODIALYSIS (HCC): Status: ACTIVE | Noted: 2020-07-11

## 2020-08-05 ENCOUNTER — HOSPITAL ENCOUNTER (OUTPATIENT)
Age: 80
Setting detail: SPECIMEN
Discharge: HOME | End: 2020-08-05

## 2020-08-05 LAB
ANION GAP SERPL CALCULATED.3IONS-SCNC: 11 MMOL/L (ref 4–16)
BUN BLDV-MCNC: 5 MG/DL (ref 6–23)
CALCIUM SERPL-MCNC: 8.4 MG/DL (ref 8.3–10.6)
CHLORIDE BLD-SCNC: 98 MMOL/L (ref 99–110)
CO2: 29 MMOL/L (ref 21–32)
CREAT SERPL-MCNC: 1.1 MG/DL (ref 0.9–1.3)
GFR AFRICAN AMERICAN: >60 ML/MIN/1.73M2
GFR NON-AFRICAN AMERICAN: >60 ML/MIN/1.73M2
GLUCOSE BLD-MCNC: 125 MG/DL (ref 70–99)
HEMOGLOBIN: 9.9 GM/DL (ref 13.5–18)
POTASSIUM SERPL-SCNC: 3.9 MMOL/L (ref 3.5–5.1)
SODIUM BLD-SCNC: 138 MMOL/L (ref 135–145)

## 2020-08-05 PROCEDURE — 85018 HEMOGLOBIN: CPT

## 2020-08-05 PROCEDURE — 80048 BASIC METABOLIC PNL TOTAL CA: CPT

## 2020-09-11 PROBLEM — I77.0 AVF (ARTERIOVENOUS FISTULA) (HCC): Status: ACTIVE | Noted: 2020-09-11

## 2021-01-21 ENCOUNTER — HOSPITAL ENCOUNTER (OUTPATIENT)
Dept: INTERVENTIONAL RADIOLOGY/VASCULAR | Age: 81
Discharge: HOME | End: 2021-01-21
Payer: MEDICARE

## 2021-01-21 VITALS
HEIGHT: 67 IN | TEMPERATURE: 97.3 F | HEART RATE: 75 BPM | OXYGEN SATURATION: 100 % | RESPIRATION RATE: 20 BRPM | SYSTOLIC BLOOD PRESSURE: 163 MMHG | DIASTOLIC BLOOD PRESSURE: 79 MMHG | BODY MASS INDEX: 25.9 KG/M2 | WEIGHT: 165 LBS

## 2021-01-21 DIAGNOSIS — N18.6 ESRD (END STAGE RENAL DISEASE) (HCC): ICD-10-CM

## 2021-01-21 LAB
APTT: 35.7 SECONDS (ref 25.1–37.1)
HCT VFR BLD CALC: 35.6 % (ref 42–52)
HEMOGLOBIN: 10.8 GM/DL (ref 13.5–18)
INR BLD: 0.98 INDEX
MCH RBC QN AUTO: 28.3 PG (ref 27–31)
MCHC RBC AUTO-ENTMCNC: 30.3 % (ref 32–36)
MCV RBC AUTO: 93.2 FL (ref 78–100)
PDW BLD-RTO: 14.5 % (ref 11.7–14.9)
PLATELET # BLD: 195 K/CU MM (ref 140–440)
PMV BLD AUTO: 10.1 FL (ref 7.5–11.1)
POTASSIUM SERPL-SCNC: 5.1 MMOL/L (ref 3.5–5.1)
PROTHROMBIN TIME: 11.8 SECONDS (ref 11.7–14.5)
RBC # BLD: 3.82 M/CU MM (ref 4.6–6.2)
WBC # BLD: 7.6 K/CU MM (ref 4–10.5)

## 2021-01-21 PROCEDURE — 85730 THROMBOPLASTIN TIME PARTIAL: CPT

## 2021-01-21 PROCEDURE — 85610 PROTHROMBIN TIME: CPT

## 2021-01-21 PROCEDURE — 6360000004 HC RX CONTRAST MEDICATION: Performed by: INTERNAL MEDICINE

## 2021-01-21 PROCEDURE — 84132 ASSAY OF SERUM POTASSIUM: CPT

## 2021-01-21 PROCEDURE — 2580000003 HC RX 258: Performed by: RADIOLOGY

## 2021-01-21 PROCEDURE — 36907 BALO ANGIOP CTR DIALYSIS SEG: CPT

## 2021-01-21 PROCEDURE — 7100000010 HC PHASE II RECOVERY - FIRST 15 MIN

## 2021-01-21 PROCEDURE — 6360000002 HC RX W HCPCS: Performed by: RADIOLOGY

## 2021-01-21 PROCEDURE — 85027 COMPLETE CBC AUTOMATED: CPT

## 2021-01-21 PROCEDURE — 36902 INTRO CATH DIALYSIS CIRCUIT: CPT

## 2021-01-21 PROCEDURE — 7100000011 HC PHASE II RECOVERY - ADDTL 15 MIN

## 2021-01-21 RX ORDER — 0.9 % SODIUM CHLORIDE 0.9 %
500 INTRAVENOUS SOLUTION INTRAVENOUS ONCE
Status: COMPLETED | OUTPATIENT
Start: 2021-01-21 | End: 2021-01-21

## 2021-01-21 RX ORDER — FENTANYL CITRATE 50 UG/ML
25 INJECTION, SOLUTION INTRAMUSCULAR; INTRAVENOUS ONCE
Status: COMPLETED | OUTPATIENT
Start: 2021-01-21 | End: 2021-01-21

## 2021-01-21 RX ORDER — MIDAZOLAM HYDROCHLORIDE 2 MG/2ML
0.5 INJECTION, SOLUTION INTRAMUSCULAR; INTRAVENOUS ONCE
Status: COMPLETED | OUTPATIENT
Start: 2021-01-21 | End: 2021-01-21

## 2021-01-21 RX ORDER — SODIUM CHLORIDE 0.9 % (FLUSH) 0.9 %
10 SYRINGE (ML) INJECTION PRN
Status: DISCONTINUED | OUTPATIENT
Start: 2021-01-21 | End: 2021-01-22 | Stop reason: HOSPADM

## 2021-01-21 RX ADMIN — SODIUM CHLORIDE 500 ML: 9 INJECTION, SOLUTION INTRAVENOUS at 16:36

## 2021-01-21 RX ADMIN — FENTANYL CITRATE 25 MCG: 50 INJECTION INTRAMUSCULAR; INTRAVENOUS at 17:01

## 2021-01-21 RX ADMIN — MIDAZOLAM HYDROCHLORIDE 0.5 MG: 1 INJECTION, SOLUTION INTRAMUSCULAR; INTRAVENOUS at 16:50

## 2021-01-21 RX ADMIN — SODIUM CHLORIDE, PRESERVATIVE FREE 10 ML: 5 INJECTION INTRAVENOUS at 14:07

## 2021-01-21 RX ADMIN — FENTANYL CITRATE 25 MCG: 50 INJECTION INTRAMUSCULAR; INTRAVENOUS at 16:37

## 2021-01-21 RX ADMIN — IOPAMIDOL 25 ML: 755 INJECTION, SOLUTION INTRAVENOUS at 17:31

## 2021-01-21 ASSESSMENT — PAIN SCALES - GENERAL
PAINLEVEL_OUTOF10: 0
PAINLEVEL_OUTOF10: 0
PAINLEVEL_OUTOF10: 8

## 2021-01-21 NOTE — PROGRESS NOTES
PROCEDURE PERFORMED: fistulagram      INFORMED CONSENT:  PT A&O, verbalizes understanding of procedure, signed consent with Dr. Bailey Batista. Consent placed in chart. TIME OUT COMPLETE: [id number] Sedation medication given by TANA Roberts [address]  Pt transferred to the table and positioned for comfort. Warm blankets given. Pt placed on Cardiac Monitor. Pt in the supine position. Chlorhexadine and draped in a sterile fashion. PAIN/LOCAL ANESTHESIA/SEDATION MANAGEMENT:  Lidocaine, Fentanyl, versed    INTRAOPERATIVE:    ACCESS TIME: 1639  US/FLUORO: both  WIRE USED: Stiff glidewire 180  SHEATH USED: 6 Fr  CATHETER USED: 9.0 x 40 mm  CONTRAST/CC: 25 cc Isovue 370    1703 6 Fr sheath removed. Pressure held by Dr Bailey Batista.        STERILE DRESSINGS:  Applied      REPORT CALLED TO: Abbey Gamboa RN [facility]  Pt transported back to [facility]

## 2021-01-21 NOTE — PROGRESS NOTES
Patient received from IR, report from Jaron Kline, VS stable, no c/o pain, fistula positive for thrill, and bruit. Crackers and pepsi provided,transport called for patient, RN to continue to monitor.

## 2021-04-16 ENCOUNTER — HOSPITAL ENCOUNTER (OUTPATIENT)
Age: 81
Setting detail: SPECIMEN
Discharge: HOME | End: 2021-04-16

## 2021-04-16 LAB
ANION GAP SERPL CALCULATED.3IONS-SCNC: 12 MMOL/L (ref 4–16)
ANION GAP SERPL CALCULATED.3IONS-SCNC: 9 MMOL/L (ref 4–16)
BUN BLDV-MCNC: 11 MG/DL (ref 6–23)
BUN BLDV-MCNC: 37 MG/DL (ref 6–23)
CALCIUM SERPL-MCNC: 8.1 MG/DL (ref 8.3–10.6)
CALCIUM SERPL-MCNC: 8.5 MG/DL (ref 8.3–10.6)
CHLORIDE BLD-SCNC: 106 MMOL/L (ref 99–110)
CHLORIDE BLD-SCNC: 95 MMOL/L (ref 99–110)
CO2: 20 MMOL/L (ref 21–32)
CO2: 28 MMOL/L (ref 21–32)
CREAT SERPL-MCNC: 0.9 MG/DL (ref 0.9–1.3)
CREAT SERPL-MCNC: 2.1 MG/DL (ref 0.9–1.3)
GFR AFRICAN AMERICAN: 37 ML/MIN/1.73M2
GFR AFRICAN AMERICAN: >60 ML/MIN/1.73M2
GFR NON-AFRICAN AMERICAN: 31 ML/MIN/1.73M2
GFR NON-AFRICAN AMERICAN: >60 ML/MIN/1.73M2
GLUCOSE BLD-MCNC: 140 MG/DL (ref 70–99)
GLUCOSE BLD-MCNC: 180 MG/DL (ref 70–99)
POTASSIUM SERPL-SCNC: 3.8 MMOL/L (ref 3.5–5.1)
POTASSIUM SERPL-SCNC: 4 MMOL/L (ref 3.5–5.1)
SODIUM BLD-SCNC: 135 MMOL/L (ref 135–145)
SODIUM BLD-SCNC: 135 MMOL/L (ref 135–145)

## 2021-04-16 PROCEDURE — 80048 BASIC METABOLIC PNL TOTAL CA: CPT

## 2021-04-29 ENCOUNTER — TELEPHONE (OUTPATIENT)
Dept: NEPHROLOGY | Age: 81
End: 2021-04-29

## 2021-04-29 PROBLEM — E87.5 HYPERKALEMIA: Status: ACTIVE | Noted: 2021-04-29

## 2021-04-29 NOTE — TELEPHONE ENCOUNTER
I called and dw pt and his nurse and his daughter, k 6.7 and he refused go er last night, will plan come office to get Suzan Mccauley and will try recommend he go to er to evaluate as may need restart on dialysis, will monitor with ecf and family

## 2021-06-09 PROBLEM — N18.4 CHRONIC KIDNEY DISEASE, STAGE IV (SEVERE) (HCC): Status: ACTIVE | Noted: 2021-06-09

## 2021-09-14 PROBLEM — D63.1 ANEMIA DUE TO STAGE 4 CHRONIC KIDNEY DISEASE (HCC): Status: ACTIVE | Noted: 2019-03-11

## 2021-09-14 PROBLEM — M48.02 SPINAL STENOSIS OF CERVICAL REGION: Status: ACTIVE | Noted: 2021-09-14

## 2021-09-14 PROBLEM — R26.89 BALANCE DISORDER: Status: ACTIVE | Noted: 2021-04-13

## 2021-09-14 PROBLEM — K40.90 RIGHT INGUINAL HERNIA: Status: ACTIVE | Noted: 2019-03-11

## 2021-09-14 PROBLEM — F51.05 INSOMNIA DUE TO OTHER MENTAL DISORDER: Status: ACTIVE | Noted: 2019-03-11

## 2021-09-14 PROBLEM — F25.9 SCHIZOAFFECTIVE DISORDER (HCC): Status: ACTIVE | Noted: 2019-03-11

## 2021-09-14 PROBLEM — R41.3 MEMORY IMPAIRMENT: Status: ACTIVE | Noted: 2019-03-11

## 2021-09-14 PROBLEM — Z74.09 IMPAIRED MOBILITY: Status: ACTIVE | Noted: 2021-07-15

## 2021-09-14 PROBLEM — F33.2 SEVERE EPISODE OF RECURRENT MAJOR DEPRESSIVE DISORDER, WITHOUT PSYCHOTIC FEATURES (HCC): Status: ACTIVE | Noted: 2021-09-14

## 2021-09-14 PROBLEM — E55.9 VITAMIN D DEFICIENCY: Status: ACTIVE | Noted: 2019-03-11

## 2021-09-14 PROBLEM — E61.1 IRON DEFICIENCY: Status: ACTIVE | Noted: 2019-03-11

## 2021-09-14 PROBLEM — H53.9 VISUAL DISTURBANCE: Status: ACTIVE | Noted: 2021-04-13

## 2021-09-14 PROBLEM — R80.9 MICROALBUMINURIA DUE TO TYPE 2 DIABETES MELLITUS (HCC): Status: ACTIVE | Noted: 2019-03-11

## 2021-09-14 PROBLEM — I65.23 STENOSIS OF BOTH INTERNAL CAROTID ARTERIES: Status: ACTIVE | Noted: 2019-03-11

## 2021-09-14 PROBLEM — R79.89 ELEVATED HOMOCYSTEINE: Status: ACTIVE | Noted: 2019-03-11

## 2021-09-14 PROBLEM — J30.2 SEASONAL ALLERGIES: Status: ACTIVE | Noted: 2020-12-22

## 2021-09-14 PROBLEM — N28.1 ACQUIRED RENAL CYST: Status: ACTIVE | Noted: 2021-04-13

## 2021-09-14 PROBLEM — N18.4 ANEMIA DUE TO STAGE 4 CHRONIC KIDNEY DISEASE (HCC): Status: ACTIVE | Noted: 2019-03-11

## 2021-09-14 PROBLEM — J30.9 ALLERGIC RHINITIS: Status: ACTIVE | Noted: 2021-09-14

## 2021-09-14 PROBLEM — G58.9 MONONEURITIS: Status: ACTIVE | Noted: 2021-09-14

## 2021-09-14 PROBLEM — E53.8 VITAMIN B12 DEFICIENCY: Status: ACTIVE | Noted: 2019-03-11

## 2021-09-14 PROBLEM — E11.29 MICROALBUMINURIA DUE TO TYPE 2 DIABETES MELLITUS (HCC): Status: ACTIVE | Noted: 2019-03-11

## 2021-09-14 PROBLEM — F99 INSOMNIA DUE TO OTHER MENTAL DISORDER: Status: ACTIVE | Noted: 2019-03-11

## 2021-09-14 PROBLEM — G24.3 SPASMODIC TORTICOLLIS: Status: ACTIVE | Noted: 2019-03-11

## 2021-09-14 PROBLEM — K21.9 GASTROESOPHAGEAL REFLUX DISEASE: Status: ACTIVE | Noted: 2021-09-14

## 2022-01-13 ENCOUNTER — APPOINTMENT (OUTPATIENT)
Dept: CT IMAGING | Age: 82
DRG: 871 | End: 2022-01-13
Payer: MEDICARE

## 2022-01-13 ENCOUNTER — HOSPITAL ENCOUNTER (INPATIENT)
Age: 82
LOS: 7 days | Discharge: HOSPICE | DRG: 871 | End: 2022-01-20
Attending: EMERGENCY MEDICINE | Admitting: STUDENT IN AN ORGANIZED HEALTH CARE EDUCATION/TRAINING PROGRAM
Payer: MEDICARE

## 2022-01-13 ENCOUNTER — APPOINTMENT (OUTPATIENT)
Dept: GENERAL RADIOLOGY | Age: 82
DRG: 871 | End: 2022-01-13
Payer: MEDICARE

## 2022-01-13 DIAGNOSIS — U07.1 PNEUMONIA DUE TO COVID-19 VIRUS: ICD-10-CM

## 2022-01-13 DIAGNOSIS — J96.00 ACUTE RESPIRATORY FAILURE, UNSPECIFIED WHETHER WITH HYPOXIA OR HYPERCAPNIA (HCC): Primary | ICD-10-CM

## 2022-01-13 DIAGNOSIS — J12.82 PNEUMONIA DUE TO COVID-19 VIRUS: ICD-10-CM

## 2022-01-13 DIAGNOSIS — I26.93 SINGLE SUBSEGMENTAL PULMONARY EMBOLISM WITHOUT ACUTE COR PULMONALE (HCC): ICD-10-CM

## 2022-01-13 DIAGNOSIS — R41.82 ALTERED MENTAL STATUS, UNSPECIFIED ALTERED MENTAL STATUS TYPE: ICD-10-CM

## 2022-01-13 DIAGNOSIS — R77.8 ELEVATED TROPONIN: ICD-10-CM

## 2022-01-13 PROBLEM — I26.99 PULMONARY EMBOLISM AND INFARCTION (HCC): Status: ACTIVE | Noted: 2022-01-13

## 2022-01-13 LAB
ACETAMINOPHEN LEVEL: <5 UG/ML (ref 15–30)
ADENOVIRUS DETECTION BY PCR: NOT DETECTED
ALBUMIN SERPL-MCNC: 3.5 GM/DL (ref 3.4–5)
ALCOHOL SCREEN SERUM: <0.01 %WT/VOL
ALP BLD-CCNC: 86 IU/L (ref 40–128)
ALT SERPL-CCNC: 29 U/L (ref 10–40)
ANION GAP SERPL CALCULATED.3IONS-SCNC: 14 MMOL/L (ref 4–16)
ANION GAP SERPL CALCULATED.3IONS-SCNC: 19 MMOL/L (ref 4–16)
APTT: >240 SECONDS (ref 25.1–37.1)
APTT: >240 SECONDS (ref 25.1–37.1)
AST SERPL-CCNC: 72 IU/L (ref 15–37)
BASE EXCESS: 10 (ref 0–3.3)
BASE EXCESS: 10 (ref 0–3.3)
BASOPHILS ABSOLUTE: 0 K/CU MM
BASOPHILS RELATIVE PERCENT: 0 % (ref 0–1)
BETA-HYDROXYBUTYRATE: >20.8 MG/DL (ref 0–3)
BILIRUB SERPL-MCNC: 0.2 MG/DL (ref 0–1)
BORDETELLA PARAPERTUSSIS BY PCR: NOT DETECTED
BORDETELLA PERTUSSIS PCR: NOT DETECTED
BUN BLDV-MCNC: 50 MG/DL (ref 6–23)
BUN BLDV-MCNC: 80 MG/DL (ref 6–23)
CALCIUM SERPL-MCNC: 7.8 MG/DL (ref 8.3–10.6)
CALCIUM SERPL-MCNC: 8.1 MG/DL (ref 8.3–10.6)
CARBON MONOXIDE, BLOOD: 1.3 % (ref 0–5)
CHLAMYDOPHILA PNEUMONIA PCR: NOT DETECTED
CHLORIDE BLD-SCNC: 103 MMOL/L (ref 99–110)
CHLORIDE BLD-SCNC: 99 MMOL/L (ref 99–110)
CO2 CONTENT: 18.3 MMOL/L (ref 19–24)
CO2: 16 MMOL/L (ref 21–32)
CO2: 23 MMOL/L (ref 21–32)
COMMENT: ABNORMAL
COMMENT: ABNORMAL
CORONAVIRUS 229E PCR: NOT DETECTED
CORONAVIRUS HKU1 PCR: NOT DETECTED
CORONAVIRUS NL63 PCR: NOT DETECTED
CORONAVIRUS OC43 PCR: NOT DETECTED
CREAT SERPL-MCNC: 2.4 MG/DL (ref 0.9–1.3)
CREAT SERPL-MCNC: 4.7 MG/DL (ref 0.9–1.3)
D DIMER: 786 NG/ML(DDU)
DIFFERENTIAL TYPE: ABNORMAL
DOSE AMOUNT: ABNORMAL
DOSE AMOUNT: ABNORMAL
DOSE TIME: ABNORMAL
DOSE TIME: ABNORMAL
EOSINOPHILS ABSOLUTE: 0 K/CU MM
EOSINOPHILS RELATIVE PERCENT: 0 % (ref 0–3)
FERRITIN: 1627 NG/ML (ref 30–400)
GFR AFRICAN AMERICAN: 15 ML/MIN/1.73M2
GFR AFRICAN AMERICAN: 32 ML/MIN/1.73M2
GFR NON-AFRICAN AMERICAN: 12 ML/MIN/1.73M2
GFR NON-AFRICAN AMERICAN: 26 ML/MIN/1.73M2
GLUCOSE BLD-MCNC: 164 MG/DL (ref 70–99)
GLUCOSE BLD-MCNC: 165 MG/DL (ref 70–99)
GLUCOSE BLD-MCNC: 188 MG/DL (ref 70–99)
GLUCOSE BLD-MCNC: 200 MG/DL (ref 70–99)
GLUCOSE BLD-MCNC: 205 MG/DL (ref 70–99)
GLUCOSE BLD-MCNC: 223 MG/DL (ref 70–99)
GLUCOSE BLD-MCNC: 282 MG/DL (ref 70–99)
HBV SURFACE AB TITR SER: 20.1 {TITER}
HCO3 ARTERIAL: 17.1 MMOL/L (ref 18–23)
HCO3 VENOUS: 18.3 MMOL/L (ref 19–25)
HCT VFR BLD CALC: 26.6 % (ref 42–52)
HEMOGLOBIN: 8.1 GM/DL (ref 13.5–18)
HEPATITIS B SURFACE ANTIGEN: ABNORMAL
HIGH SENSITIVE C-REACTIVE PROTEIN: 124.3 MG/L
HUMAN METAPNEUMOVIRUS PCR: NOT DETECTED
IMMATURE NEUTROPHIL %: 0.7 % (ref 0–0.43)
INFLUENZA A BY PCR: NOT DETECTED
INFLUENZA A H1 (2009) PCR: NOT DETECTED
INFLUENZA A H1 PANDEMIC PCR: NOT DETECTED
INFLUENZA A H3 PCR: NOT DETECTED
INFLUENZA B BY PCR: NOT DETECTED
INR BLD: 1.13 INDEX
LACTATE DEHYDROGENASE: 295 IU/L (ref 120–246)
LACTATE: 0.6 MMOL/L (ref 0.4–2)
LYMPHOCYTES ABSOLUTE: 0.4 K/CU MM
LYMPHOCYTES RELATIVE PERCENT: 10.5 % (ref 24–44)
MCH RBC QN AUTO: 27.9 PG (ref 27–31)
MCHC RBC AUTO-ENTMCNC: 30.5 % (ref 32–36)
MCV RBC AUTO: 91.7 FL (ref 78–100)
METHEMOGLOBIN ARTERIAL: 1.1 %
MONOCYTES ABSOLUTE: 0.2 K/CU MM
MONOCYTES RELATIVE PERCENT: 5.2 % (ref 0–4)
MYCOPLASMA PNEUMONIAE PCR: NOT DETECTED
NUCLEATED RBC %: 0 %
O2 SAT, VEN: 93.9 % (ref 50–70)
O2 SATURATION: 95.7 % (ref 96–97)
PARAINFLUENZA 1 PCR: NOT DETECTED
PARAINFLUENZA 2 PCR: NOT DETECTED
PARAINFLUENZA 3 PCR: NOT DETECTED
PARAINFLUENZA 4 PCR: NOT DETECTED
PCO2 ARTERIAL: 39 MMHG (ref 32–45)
PCO2, VEN: 49 MMHG (ref 38–52)
PDW BLD-RTO: 14 % (ref 11.7–14.9)
PH BLOOD: 7.25 (ref 7.34–7.45)
PH VENOUS: 7.18 (ref 7.32–7.42)
PLATELET # BLD: 136 K/CU MM (ref 140–440)
PMV BLD AUTO: 10.6 FL (ref 7.5–11.1)
PO2 ARTERIAL: 97 MMHG (ref 75–100)
PO2, VEN: 80 MMHG (ref 28–48)
POTASSIUM SERPL-SCNC: 3.8 MMOL/L (ref 3.5–5.1)
POTASSIUM SERPL-SCNC: 5 MMOL/L (ref 3.5–5.1)
PRO-BNP: 972.8 PG/ML
PROCALCITONIN: 1.4
PROTHROMBIN TIME: 14.6 SECONDS (ref 11.7–14.5)
RBC # BLD: 2.9 M/CU MM (ref 4.6–6.2)
RHINOVIRUS ENTEROVIRUS PCR: NOT DETECTED
RSV PCR: NOT DETECTED
SALICYLATE LEVEL: <0.3 MG/DL (ref 15–30)
SARS-COV-2: ABNORMAL
SEGMENTED NEUTROPHILS ABSOLUTE COUNT: 3.4 K/CU MM
SEGMENTED NEUTROPHILS RELATIVE PERCENT: 83.6 % (ref 36–66)
SODIUM BLD-SCNC: 136 MMOL/L (ref 135–145)
SODIUM BLD-SCNC: 138 MMOL/L (ref 135–145)
TOTAL IMMATURE NEUTOROPHIL: 0.03 K/CU MM
TOTAL NUCLEATED RBC: 0 K/CU MM
TOTAL PROTEIN: 6 GM/DL (ref 6.4–8.2)
TROPONIN T: 0.22 NG/ML
TROPONIN T: 0.37 NG/ML
WBC # BLD: 4 K/CU MM (ref 4–10.5)

## 2022-01-13 PROCEDURE — 6360000004 HC RX CONTRAST MEDICATION: Performed by: PHYSICIAN ASSISTANT

## 2022-01-13 PROCEDURE — 99285 EMERGENCY DEPT VISIT HI MDM: CPT

## 2022-01-13 PROCEDURE — 87341 HEP B SURFACE AG NEUTRLZJ IA: CPT

## 2022-01-13 PROCEDURE — 85025 COMPLETE CBC W/AUTO DIFF WBC: CPT

## 2022-01-13 PROCEDURE — 83615 LACTATE (LD) (LDH) ENZYME: CPT

## 2022-01-13 PROCEDURE — 87070 CULTURE OTHR SPECIMN AEROBIC: CPT

## 2022-01-13 PROCEDURE — 71275 CT ANGIOGRAPHY CHEST: CPT

## 2022-01-13 PROCEDURE — 99223 1ST HOSP IP/OBS HIGH 75: CPT | Performed by: STUDENT IN AN ORGANIZED HEALTH CARE EDUCATION/TRAINING PROGRAM

## 2022-01-13 PROCEDURE — 36415 COLL VENOUS BLD VENIPUNCTURE: CPT

## 2022-01-13 PROCEDURE — 85610 PROTHROMBIN TIME: CPT

## 2022-01-13 PROCEDURE — 87449 NOS EACH ORGANISM AG IA: CPT

## 2022-01-13 PROCEDURE — 82962 GLUCOSE BLOOD TEST: CPT

## 2022-01-13 PROCEDURE — 87899 AGENT NOS ASSAY W/OPTIC: CPT

## 2022-01-13 PROCEDURE — G0480 DRUG TEST DEF 1-7 CLASSES: HCPCS

## 2022-01-13 PROCEDURE — 82805 BLOOD GASES W/O2 SATURATION: CPT

## 2022-01-13 PROCEDURE — 85730 THROMBOPLASTIN TIME PARTIAL: CPT

## 2022-01-13 PROCEDURE — 90935 HEMODIALYSIS ONE EVALUATION: CPT

## 2022-01-13 PROCEDURE — P9047 ALBUMIN (HUMAN), 25%, 50ML: HCPCS | Performed by: INTERNAL MEDICINE

## 2022-01-13 PROCEDURE — 6370000000 HC RX 637 (ALT 250 FOR IP): Performed by: HOSPITALIST

## 2022-01-13 PROCEDURE — 85027 COMPLETE CBC AUTOMATED: CPT

## 2022-01-13 PROCEDURE — 2580000003 HC RX 258: Performed by: STUDENT IN AN ORGANIZED HEALTH CARE EDUCATION/TRAINING PROGRAM

## 2022-01-13 PROCEDURE — 80307 DRUG TEST PRSMV CHEM ANLYZR: CPT

## 2022-01-13 PROCEDURE — 87040 BLOOD CULTURE FOR BACTERIA: CPT

## 2022-01-13 PROCEDURE — 71045 X-RAY EXAM CHEST 1 VIEW: CPT

## 2022-01-13 PROCEDURE — 87635 SARS-COV-2 COVID-19 AMP PRB: CPT

## 2022-01-13 PROCEDURE — 80048 BASIC METABOLIC PNL TOTAL CA: CPT

## 2022-01-13 PROCEDURE — 82010 KETONE BODYS QUAN: CPT

## 2022-01-13 PROCEDURE — 84145 PROCALCITONIN (PCT): CPT

## 2022-01-13 PROCEDURE — 2500000003 HC RX 250 WO HCPCS: Performed by: STUDENT IN AN ORGANIZED HEALTH CARE EDUCATION/TRAINING PROGRAM

## 2022-01-13 PROCEDURE — 92610 EVALUATE SWALLOWING FUNCTION: CPT

## 2022-01-13 PROCEDURE — 2700000000 HC OXYGEN THERAPY PER DAY

## 2022-01-13 PROCEDURE — 99222 1ST HOSP IP/OBS MODERATE 55: CPT | Performed by: INTERNAL MEDICINE

## 2022-01-13 PROCEDURE — 6360000002 HC RX W HCPCS: Performed by: INTERNAL MEDICINE

## 2022-01-13 PROCEDURE — 6370000000 HC RX 637 (ALT 250 FOR IP): Performed by: STUDENT IN AN ORGANIZED HEALTH CARE EDUCATION/TRAINING PROGRAM

## 2022-01-13 PROCEDURE — 86706 HEP B SURFACE ANTIBODY: CPT

## 2022-01-13 PROCEDURE — 87340 HEPATITIS B SURFACE AG IA: CPT

## 2022-01-13 PROCEDURE — 83605 ASSAY OF LACTIC ACID: CPT

## 2022-01-13 PROCEDURE — 83880 ASSAY OF NATRIURETIC PEPTIDE: CPT

## 2022-01-13 PROCEDURE — 0202U NFCT DS 22 TRGT SARS-COV-2: CPT

## 2022-01-13 PROCEDURE — 82803 BLOOD GASES ANY COMBINATION: CPT

## 2022-01-13 PROCEDURE — 87150 DNA/RNA AMPLIFIED PROBE: CPT

## 2022-01-13 PROCEDURE — 86141 C-REACTIVE PROTEIN HS: CPT

## 2022-01-13 PROCEDURE — 2060000000 HC ICU INTERMEDIATE R&B

## 2022-01-13 PROCEDURE — 85379 FIBRIN DEGRADATION QUANT: CPT

## 2022-01-13 PROCEDURE — 82140 ASSAY OF AMMONIA: CPT

## 2022-01-13 PROCEDURE — 70450 CT HEAD/BRAIN W/O DYE: CPT

## 2022-01-13 PROCEDURE — 6360000002 HC RX W HCPCS: Performed by: PHYSICIAN ASSISTANT

## 2022-01-13 PROCEDURE — 84484 ASSAY OF TROPONIN QUANT: CPT

## 2022-01-13 PROCEDURE — 94761 N-INVAS EAR/PLS OXIMETRY MLT: CPT

## 2022-01-13 PROCEDURE — 96374 THER/PROPH/DIAG INJ IV PUSH: CPT

## 2022-01-13 PROCEDURE — 93005 ELECTROCARDIOGRAM TRACING: CPT | Performed by: PHYSICIAN ASSISTANT

## 2022-01-13 PROCEDURE — 82728 ASSAY OF FERRITIN: CPT

## 2022-01-13 PROCEDURE — 36600 WITHDRAWAL OF ARTERIAL BLOOD: CPT

## 2022-01-13 PROCEDURE — 80053 COMPREHEN METABOLIC PANEL: CPT

## 2022-01-13 PROCEDURE — 70498 CT ANGIOGRAPHY NECK: CPT

## 2022-01-13 RX ORDER — SODIUM CHLORIDE 9 MG/ML
25 INJECTION, SOLUTION INTRAVENOUS PRN
Status: DISCONTINUED | OUTPATIENT
Start: 2022-01-13 | End: 2022-01-20 | Stop reason: HOSPADM

## 2022-01-13 RX ORDER — MORPHINE SULFATE 15 MG/1
1 TABLET, FILM COATED, EXTENDED RELEASE ORAL DAILY
Status: ON HOLD | COMMUNITY
Start: 2022-01-08 | End: 2022-01-26 | Stop reason: HOSPADM

## 2022-01-13 RX ORDER — BUSPIRONE HYDROCHLORIDE 5 MG/1
5 TABLET ORAL 2 TIMES DAILY
Status: DISCONTINUED | OUTPATIENT
Start: 2022-01-13 | End: 2022-01-20 | Stop reason: HOSPADM

## 2022-01-13 RX ORDER — HEPARIN SODIUM 10000 [USP'U]/100ML
5-30 INJECTION, SOLUTION INTRAVENOUS CONTINUOUS
Status: DISCONTINUED | OUTPATIENT
Start: 2022-01-13 | End: 2022-01-14

## 2022-01-13 RX ORDER — PANTOPRAZOLE SODIUM 40 MG/1
40 TABLET, DELAYED RELEASE ORAL DAILY
Status: DISCONTINUED | OUTPATIENT
Start: 2022-01-13 | End: 2022-01-20 | Stop reason: HOSPADM

## 2022-01-13 RX ORDER — ASPIRIN 81 MG/1
81 TABLET, CHEWABLE ORAL DAILY
Status: DISCONTINUED | OUTPATIENT
Start: 2022-01-13 | End: 2022-01-19

## 2022-01-13 RX ORDER — POLYETHYLENE GLYCOL 3350 17 G/17G
17 POWDER, FOR SOLUTION ORAL DAILY PRN
Status: DISCONTINUED | OUTPATIENT
Start: 2022-01-13 | End: 2022-01-20 | Stop reason: HOSPADM

## 2022-01-13 RX ORDER — ACETAMINOPHEN 650 MG/1
650 SUPPOSITORY RECTAL EVERY 6 HOURS PRN
Status: DISCONTINUED | OUTPATIENT
Start: 2022-01-13 | End: 2022-01-20 | Stop reason: HOSPADM

## 2022-01-13 RX ORDER — HEPARIN SODIUM 1000 [USP'U]/ML
80 INJECTION, SOLUTION INTRAVENOUS; SUBCUTANEOUS ONCE
Status: COMPLETED | OUTPATIENT
Start: 2022-01-13 | End: 2022-01-13

## 2022-01-13 RX ORDER — QUETIAPINE FUMARATE 25 MG/1
75 TABLET, FILM COATED ORAL 2 TIMES DAILY
Status: DISCONTINUED | OUTPATIENT
Start: 2022-01-13 | End: 2022-01-20 | Stop reason: HOSPADM

## 2022-01-13 RX ORDER — CHOLECALCIFEROL (VITAMIN D3) 50 MCG
1 TABLET ORAL DAILY
Status: ON HOLD | COMMUNITY
Start: 2021-12-20 | End: 2022-01-26 | Stop reason: HOSPADM

## 2022-01-13 RX ORDER — PROCHLORPERAZINE EDISYLATE 5 MG/ML
10 INJECTION INTRAMUSCULAR; INTRAVENOUS EVERY 6 HOURS PRN
Status: DISCONTINUED | OUTPATIENT
Start: 2022-01-13 | End: 2022-01-20

## 2022-01-13 RX ORDER — DONEPEZIL HYDROCHLORIDE 5 MG/1
10 TABLET, FILM COATED ORAL NIGHTLY
Status: DISCONTINUED | OUTPATIENT
Start: 2022-01-13 | End: 2022-01-20 | Stop reason: HOSPADM

## 2022-01-13 RX ORDER — DEXAMETHASONE SODIUM PHOSPHATE 10 MG/ML
8 INJECTION, SOLUTION INTRAMUSCULAR; INTRAVENOUS ONCE
Status: COMPLETED | OUTPATIENT
Start: 2022-01-13 | End: 2022-01-13

## 2022-01-13 RX ORDER — DEXAMETHASONE SODIUM PHOSPHATE 10 MG/ML
6 INJECTION, SOLUTION INTRAMUSCULAR; INTRAVENOUS EVERY 24 HOURS
Status: DISCONTINUED | OUTPATIENT
Start: 2022-01-14 | End: 2022-01-20 | Stop reason: HOSPADM

## 2022-01-13 RX ORDER — SODIUM CHLORIDE 0.9 % (FLUSH) 0.9 %
5-40 SYRINGE (ML) INJECTION PRN
Status: DISCONTINUED | OUTPATIENT
Start: 2022-01-13 | End: 2022-01-20 | Stop reason: HOSPADM

## 2022-01-13 RX ORDER — DEXTROSE MONOHYDRATE 25 G/50ML
12.5 INJECTION, SOLUTION INTRAVENOUS PRN
Status: DISCONTINUED | OUTPATIENT
Start: 2022-01-13 | End: 2022-01-20 | Stop reason: HOSPADM

## 2022-01-13 RX ORDER — TRAZODONE HYDROCHLORIDE 50 MG/1
75 TABLET ORAL NIGHTLY
Status: DISCONTINUED | OUTPATIENT
Start: 2022-01-13 | End: 2022-01-20 | Stop reason: HOSPADM

## 2022-01-13 RX ORDER — INSULIN GLARGINE 100 [IU]/ML
25 INJECTION, SOLUTION SUBCUTANEOUS NIGHTLY
Status: DISCONTINUED | OUTPATIENT
Start: 2022-01-13 | End: 2022-01-19

## 2022-01-13 RX ORDER — HEPARIN SODIUM 1000 [USP'U]/ML
80 INJECTION, SOLUTION INTRAVENOUS; SUBCUTANEOUS PRN
Status: DISCONTINUED | OUTPATIENT
Start: 2022-01-13 | End: 2022-01-14

## 2022-01-13 RX ORDER — CHOLECALCIFEROL (VITAMIN D3) 125 MCG
10 CAPSULE ORAL NIGHTLY PRN
Status: DISCONTINUED | OUTPATIENT
Start: 2022-01-13 | End: 2022-01-20 | Stop reason: HOSPADM

## 2022-01-13 RX ORDER — MORPHINE SULFATE 15 MG/1
15 TABLET, FILM COATED, EXTENDED RELEASE ORAL DAILY
Status: DISCONTINUED | OUTPATIENT
Start: 2022-01-13 | End: 2022-01-20 | Stop reason: HOSPADM

## 2022-01-13 RX ORDER — ATORVASTATIN CALCIUM 40 MG/1
80 TABLET, FILM COATED ORAL NIGHTLY
Status: DISCONTINUED | OUTPATIENT
Start: 2022-01-13 | End: 2022-01-20 | Stop reason: HOSPADM

## 2022-01-13 RX ORDER — LEVOFLOXACIN 500 MG/1
500 TABLET, FILM COATED ORAL EVERY OTHER DAY
Status: DISCONTINUED | OUTPATIENT
Start: 2022-01-13 | End: 2022-01-20 | Stop reason: HOSPADM

## 2022-01-13 RX ORDER — ARIPIPRAZOLE 5 MG/1
5 TABLET ORAL DAILY
Status: DISCONTINUED | OUTPATIENT
Start: 2022-01-13 | End: 2022-01-20 | Stop reason: HOSPADM

## 2022-01-13 RX ORDER — ALBUTEROL SULFATE 90 UG/1
2 AEROSOL, METERED RESPIRATORY (INHALATION) EVERY 4 HOURS PRN
Status: DISCONTINUED | OUTPATIENT
Start: 2022-01-13 | End: 2022-01-20 | Stop reason: HOSPADM

## 2022-01-13 RX ORDER — MIDODRINE HYDROCHLORIDE 5 MG/1
5 TABLET ORAL 2 TIMES DAILY
Status: DISCONTINUED | OUTPATIENT
Start: 2022-01-13 | End: 2022-01-20 | Stop reason: HOSPADM

## 2022-01-13 RX ORDER — HEPARIN SODIUM 1000 [USP'U]/ML
40 INJECTION, SOLUTION INTRAVENOUS; SUBCUTANEOUS PRN
Status: DISCONTINUED | OUTPATIENT
Start: 2022-01-13 | End: 2022-01-14

## 2022-01-13 RX ORDER — NICOTINE POLACRILEX 4 MG
15 LOZENGE BUCCAL PRN
Status: DISCONTINUED | OUTPATIENT
Start: 2022-01-13 | End: 2022-01-20 | Stop reason: HOSPADM

## 2022-01-13 RX ORDER — LEVOTHYROXINE SODIUM 0.1 MG/1
100 TABLET ORAL DAILY
Status: DISCONTINUED | OUTPATIENT
Start: 2022-01-13 | End: 2022-01-20 | Stop reason: HOSPADM

## 2022-01-13 RX ORDER — MEMANTINE HYDROCHLORIDE 5 MG/1
5 TABLET ORAL 2 TIMES DAILY
Status: DISCONTINUED | OUTPATIENT
Start: 2022-01-13 | End: 2022-01-20 | Stop reason: HOSPADM

## 2022-01-13 RX ORDER — HYDROXYZINE 50 MG/1
50 TABLET, FILM COATED ORAL DAILY PRN
Status: DISCONTINUED | OUTPATIENT
Start: 2022-01-13 | End: 2022-01-20 | Stop reason: HOSPADM

## 2022-01-13 RX ORDER — SODIUM CHLORIDE 0.9 % (FLUSH) 0.9 %
5-40 SYRINGE (ML) INJECTION EVERY 12 HOURS SCHEDULED
Status: DISCONTINUED | OUTPATIENT
Start: 2022-01-13 | End: 2022-01-20 | Stop reason: HOSPADM

## 2022-01-13 RX ORDER — ACETAMINOPHEN 325 MG/1
650 TABLET ORAL EVERY 6 HOURS PRN
Status: DISCONTINUED | OUTPATIENT
Start: 2022-01-13 | End: 2022-01-20 | Stop reason: HOSPADM

## 2022-01-13 RX ORDER — FLUTICASONE PROPIONATE 50 MCG
2 SPRAY, SUSPENSION (ML) NASAL DAILY
Status: DISCONTINUED | OUTPATIENT
Start: 2022-01-13 | End: 2022-01-20 | Stop reason: HOSPADM

## 2022-01-13 RX ORDER — ALBUMIN (HUMAN) 12.5 G/50ML
25 SOLUTION INTRAVENOUS ONCE
Status: COMPLETED | OUTPATIENT
Start: 2022-01-13 | End: 2022-01-13

## 2022-01-13 RX ORDER — DEXTROSE MONOHYDRATE 50 MG/ML
100 INJECTION, SOLUTION INTRAVENOUS PRN
Status: DISCONTINUED | OUTPATIENT
Start: 2022-01-13 | End: 2022-01-20 | Stop reason: HOSPADM

## 2022-01-13 RX ADMIN — BUSPIRONE HYDROCHLORIDE 5 MG: 5 TABLET ORAL at 22:47

## 2022-01-13 RX ADMIN — TRAZODONE HYDROCHLORIDE 75 MG: 50 TABLET ORAL at 22:46

## 2022-01-13 RX ADMIN — IOPAMIDOL 125 ML: 755 INJECTION, SOLUTION INTRAVENOUS at 02:37

## 2022-01-13 RX ADMIN — LEVOTHYROXINE SODIUM 100 MCG: 0.1 TABLET ORAL at 11:31

## 2022-01-13 RX ADMIN — MIDODRINE HYDROCHLORIDE 5 MG: 5 TABLET ORAL at 11:31

## 2022-01-13 RX ADMIN — SERTRALINE HYDROCHLORIDE 50 MG: 50 TABLET ORAL at 11:34

## 2022-01-13 RX ADMIN — MIDODRINE HYDROCHLORIDE 5 MG: 5 TABLET ORAL at 22:46

## 2022-01-13 RX ADMIN — ASPIRIN 81 MG CHEWABLE TABLET 81 MG: 81 TABLET CHEWABLE at 11:31

## 2022-01-13 RX ADMIN — MEMANTINE HYDROCHLORIDE 5 MG: 5 TABLET ORAL at 11:30

## 2022-01-13 RX ADMIN — HEPARIN SODIUM 18 UNITS/KG/HR: 10000 INJECTION, SOLUTION INTRAVENOUS at 05:48

## 2022-01-13 RX ADMIN — DEXAMETHASONE SODIUM PHOSPHATE 8 MG: 10 INJECTION INTRAMUSCULAR; INTRAVENOUS at 05:49

## 2022-01-13 RX ADMIN — FLUTICASONE PROPIONATE 2 SPRAY: 50 SPRAY, METERED NASAL at 11:30

## 2022-01-13 RX ADMIN — HEPARIN SODIUM 6100 UNITS: 1000 INJECTION, SOLUTION INTRAVENOUS; SUBCUTANEOUS at 05:44

## 2022-01-13 RX ADMIN — QUETIAPINE FUMARATE 75 MG: 25 TABLET ORAL at 22:46

## 2022-01-13 RX ADMIN — QUETIAPINE FUMARATE 75 MG: 25 TABLET ORAL at 11:31

## 2022-01-13 RX ADMIN — ATORVASTATIN CALCIUM 80 MG: 40 TABLET, FILM COATED ORAL at 22:46

## 2022-01-13 RX ADMIN — BUSPIRONE HYDROCHLORIDE 5 MG: 5 TABLET ORAL at 11:31

## 2022-01-13 RX ADMIN — SODIUM BICARBONATE: 84 INJECTION, SOLUTION INTRAVENOUS at 06:54

## 2022-01-13 RX ADMIN — ARIPIPRAZOLE 5 MG: 5 TABLET ORAL at 11:30

## 2022-01-13 RX ADMIN — Medication 2000 UNITS: at 11:31

## 2022-01-13 RX ADMIN — PANTOPRAZOLE SODIUM 40 MG: 40 TABLET, DELAYED RELEASE ORAL at 11:30

## 2022-01-13 RX ADMIN — DONEPEZIL HYDROCHLORIDE 10 MG: 5 TABLET, FILM COATED ORAL at 22:47

## 2022-01-13 RX ADMIN — INSULIN GLARGINE 25 UNITS: 100 INJECTION, SOLUTION SUBCUTANEOUS at 22:51

## 2022-01-13 RX ADMIN — SODIUM CHLORIDE, PRESERVATIVE FREE 10 ML: 5 INJECTION INTRAVENOUS at 11:32

## 2022-01-13 RX ADMIN — ALBUMIN (HUMAN) 25 G: 0.25 INJECTION, SOLUTION INTRAVENOUS at 20:19

## 2022-01-13 RX ADMIN — LEVOFLOXACIN 500 MG: 500 TABLET, FILM COATED ORAL at 18:55

## 2022-01-13 ASSESSMENT — PAIN SCALES - GENERAL
PAINLEVEL_OUTOF10: 0
PAINLEVEL_OUTOF10: 0

## 2022-01-13 NOTE — ED NOTES
Report given to [facility] ERICH LOPEZ for room 2003 and denies further questions     Lit Lopez RN  01/13/22 9974

## 2022-01-13 NOTE — CONSULTS
Neurology Service Consult Note  [facility]    Patient Name: Michoacano Briggs  : 1940        Subjective:   Reason for consult: stroke like symptoms and AMS  80 y.o. - male with history of DM, HTN, HLD, and neuropathy presenting to [facility] presenting from [facility] with AMS. History obtained from chart as patient is unable to provide any pertinent information. He was found to be unresponsive at the [facility] with pinpoint pupils. He was noted to hypoxic with oxygen saturations in the 70's. He received Narcan as he is opioid dependent and was noted to be more responsive. He was a stroke alert on arrival but was cancelled. He was found to be positive for COVID-19 with acute PE, SO, and NSTEMI. Patient seen and examined. He responds to painful stimuli. Not following commands. He resists eye opening. CT of head was non acute. CTA of head and neck unremarkable. Due to acute on chronic kidney disease nephrology is going to initiate dialysis.        Past Medical History:   Diagnosis Date    Anemia     Carotid stenosis     hx per old chart    CKD (chronic kidney disease)     stage 3 hx per old chart    Depression     Diabetes ([facility]     Dystonia per pt    Fracture     per old chart hx fx right hand metacarpal-(2019) scheduled for surgery 10/4/2019    GERD (gastroesophageal reflux disease)     Hyperlipidemia     Hypertension     Hypothyroidism     Metabolic encephalopathy     dx with admission 2018    Neuropathy     Non-smoker per pt    Poor historian     Schizoaffective disorder ([facility]     hx per old chart    Vitamin D deficiency     hx per old chart    :   Past Surgical History:   Procedure Laterality Date    ANKLE SURGERY Left 2016    [facility]   [place] [facility]ERY      Cervical 3    COLONOSCOPY      ENDOSCOPY, COLON, DIAGNOSTIC      EYE SURGERY  2019    Left cataract surgery    FRACTURE SURGERY      Right Hand    HAND SURGERY Right 10/4/2019    HAND OPEN REDUCTION INTERNAL FIXATION RIGHT SECOND METACARPAL performed by Mateo Sanchez DO at [address] Right     Inguinal Hernia    TONSILLECTOMY       Medications:  Scheduled Meds:   ARIPiprazole  5 mg Oral Daily    aspirin  81 mg Oral Daily    atorvastatin  80 mg Oral Nightly    busPIRone  5 mg Oral BID    donepezil  10 mg Oral Nightly    fluticasone  2 spray Each Nostril Daily    insulin glargine  25 Units SubCUTAneous Nightly    levothyroxine  100 mcg Oral Daily    memantine  5 mg Oral BID    midodrine  5 mg Oral BID    morphine  15 mg Oral Daily    pantoprazole  40 mg Oral Daily    QUEtiapine  75 mg Oral BID    sertraline  50 mg Oral Daily    traZODone  75 mg Oral Nightly    vitamin D  2,000 Units Oral Daily    sodium chloride flush  5-40 mL IntraVENous 2 times per day    insulin lispro  0-12 Units SubCUTAneous Q4H    [START ON 1/14/2022] dexamethasone  6 mg IntraVENous Q24H     Continuous Infusions:   heparin (PORCINE) Infusion 18 Units/kg/hr (01/13/22 0840)    IV infusion builder 125 mL/hr at 01/13/22 0654    sodium chloride      dextrose       PRN Meds:.heparin (porcine), heparin (porcine), albuterol sulfate HFA, hydrOXYzine, melatonin, sodium chloride flush, sodium chloride, polyethylene glycol, acetaminophen **OR** acetaminophen, glucose, dextrose, glucagon (rDNA), dextrose, prochlorperazine    Allergies   Allergen Reactions    Amitriptyline Other (See Comments)     sedation  Other reaction(s): sedation  sedation    Codeine      States have used this with no issues  Other reaction(s): \"crazy\"  States have used this with no issues    Gabapentin      Neuro toxicity myoclonus     Suprax [Cefixime] Other (See Comments)     Pain     Social History     Socioeconomic History    Marital status:      Spouse name: Not on file    Number of children: Not on file    Years of education: Not on file    Highest education level: Not on file   Occupational History    Not on file Tobacco Use    Smoking status: Never Smoker    Smokeless tobacco: Never Used   Vaping Use    Vaping Use: Never used   Substance and Sexual Activity    Alcohol use: No     Alcohol/week: 0.0 standard drinks    Drug use: No    Sexual activity: Not on file   Other Topics Concern    Not on file   Social History Narrative    Not on file     Social Determinants of Health     Financial Resource Strain:     Difficulty of Paying Living Expenses: Not on file   Food Insecurity:     Worried About Running Out of Food in the Last Year: Not on file    Renny of Food in the Last Year: Not on file   Transportation Needs:     Lack of Transportation (Medical): Not on file    Lack of Transportation (Non-Medical):  Not on file   Physical Activity:     Days of Exercise per Week: Not on file    Minutes of Exercise per Session: Not on file   Stress:     Feeling of Stress : Not on file   Social Connections:     Frequency of Communication with Friends and Family: Not on file    Frequency of Social Gatherings with Friends and Family: Not on file    Attends Mu-ism Services: Not on file    Active Member of [address] [place] [address] or Organizations: Not on file    Attends Club or Organization Meetings: Not on file    Marital Status: Not on file   Intimate Partner Violence:     Fear of Current or Ex-Partner: Not on file    Emotionally Abused: Not on file    Physically Abused: Not on file    Sexually Abused: Not on file   Housing Stability:     Unable to Pay for Housing in the Last Year: Not on file    Number of [place] in the Last Year: Not on file    Unstable Housing in the Last Year: Not on file      Family History   Problem Relation Age of Onset    Diabetes Father     High Cholesterol Father     Heart Disease Maternal Grandmother     Heart Disease Maternal Grandfather     Stroke Maternal Grandfather     Heart Disease Paternal Grandmother     Diabetes Paternal Grandfather     Heart Disease Yohannes Rodriguez High Blood Pressure Paternal Grandfather     Depression Daughter         attempted suicide         ROS (10 systems)  Unable to be completed due to  Mental status    Physical Exam:       [unfilled]   Wt Readings from Last 3 Encounters:   01/13/22 168 lb (76.2 kg)   09/08/21 167 lb 11.2 oz (76.1 kg)   06/09/21 167 lb 12.8 oz (76.1 kg)     Temp Readings from Last 3 Encounters:   01/13/22 98.6 °F (37 °C) (Axillary)   09/08/21 97 °F (36.1 °C)   06/09/21 97.9 °F (36.6 °C) (Infrared)     BP Readings from Last 3 Encounters:   01/13/22 (!) 80/46   09/08/21 138/62   06/09/21 118/62     Pulse Readings from Last 3 Encounters:   01/13/22 66   09/08/21 77   06/09/21 72        Gen: obtunded  HEENT: NC/AT, EOMI. Difficult to assess pupillary response as he resists eye opening, mmm, no carotid bruits, neck supple, no meningeal signs; Heart: SR monitor   Lungs: respirations unlabored  Ext: no edema, no calf tenderness b/l  Psych: normal mood and affect  Skin: no rashes or lesions    NEUROLOGIC EXAM:    Mental Status: obtunded. Opens eyes briefly to name. Resists eye opening. No following commands. Moans to stimuli. Cranial Nerve Exam:   CN II-XII: Resists eye opening. No evidence of gaze preference. Face appears symmetric. Motor Exam:       Withdrawals to pain  Deep Tendon Reflexes: 1/4 biceps, triceps, brachioradialis, patellar, and achilles b/l; flexor plantar responses b/l    Sensation: withdrawals to pain    Coordination/Cerebellum:       Tremors--none      Rapidly alternating movements: TIFFANIE               Heel-to-Shin: TIFFANIE      Finger-to-Nose: TIFFANIE    Gait and stance:      Gait: deferred      LABS:     Recent Labs     01/13/22  0250   WBC 4.0      K 5.0      CO2 16*   BUN 80*   CREATININE 4.7*   GLUCOSE 188*   INR 1.13   [email or web address],       IMAGING:    CT of head:  No acute intracranial hemorrhage, mass effect, midline shift, or   hydrocephalus.  Streak artifact over the frontal lobes but no sign of an   acute territorial infarct.       Volume loss with prominence of the ventricles and sulci are stable. CTA of head and neck:  1.  No large vessel occlusion in the head or neck.       2.  Mild-to-moderate atherosclerotic disease.       3.  Scattered heterogeneous opacities in the visualized lung apices may   relate to an atypical infectious/inflammatory process including atypical or   viral/COVID-19 pneumonia versus mild pulmonary edema.       4.  C4-C5 osseous sclerosis and endplate erosions may relate to advanced   degenerative changes.  Underlying discitis/osteomyelitis is difficult to   entirely exclude and could be further evaluated with follow-up cervical spine   MRI. Above imaging personally reviewed. ASSESSMENT/PLAN:   This is a 79 y/o with history of DM, HTN, HLD, and neuropathy presenting with AMS. He was found to be positive for COVID-19 with acute PE, SO, and NSTEMI. He is currently obtunded. He withdrawals to painful stimuli. Exam appears non focal and non lateralizing. 1. AMS due to acute metabolic/toxic encephalopathy secondary to COVID-19, opioid use, hypoxia, and SO superimposed on diminished cognitive reserve. · CT of head as above  · CTA of head and neck as above  · No clinical concern for acute stroke at this time. He is already on ASA and statin therapy  · Promote adequate sleep schedule: lights on, blinds open during day; dark quiet environment at night. · Nothing further from our standpoint. We will sign off. Thank you for allowing us to participate in the care of your patient. If there are any questions regarding evaluation please feel free to contact us. ALENA Lynn - CNS, 1/13/2022    ------------------------------------    Attending Note:  I have rounded on this patient with LIAM Sabillon. I have reviewed the chart and we have discussed this case in detail.  The patient was seen and examined by myself. Pertinent labs and imaging have been personally reviewed. Our findings and impressions were discussed with the patient. I concur with the Nurse Specialist's assessment and plan. Suspect acute metabolic encephalopathy in the setting of COVID-19 infection, PE, SO. Patient has a nonfocal examination. Do not feel that any further neurodiagnostics are necessarily warranted at this time. Anticipate that patient's mentation will improve with treatment of underlying metabolic derangements.     Benigno Dinh DO 1/13/2022 4:21 PM

## 2022-01-13 NOTE — PROGRESS NOTES
Speech Language Pathology  Facility/Department: [facility] CVICU   CLINICAL BEDSIDE SWALLOW EVALUATION    NAME: Ciro Stone  : 1940  MRN: [id number]    IMPRESSIONS: Ciro Stone was referred for a bedside swallow evaluation following admission to [facility] with acute respiratory failure 2/2 COVID PNA, SO on CKD, found to have PE. Medical hx includes CKD, opioid dependence, DM, HTN, HLD, GERD, schizoaffective disorder. Pt last seen by SLP during admission in May 2020 and recommended soft and bite-sized diet with thin liquids at that time. Pt seen for evaluation seated upright in bed, alert, confused, cooperative given occasional cues. Oral mechanism examination reveals generally reduced coordination without asymmetry. Pt presented with PO trials of ice chips, thin liquids via tsp/cup/straw, nectar thick liquids via straw, puree, and regular solids. Oral stage mild-moderately impaired, characterized by adequate labial seal, slow/reduced mastication (pt is edentulous), adequate AP transit/clearance, suspected premature pharyngeal entry with large/consecuitive drinks of liquids. Pt does not follow verbal commands to limit rate/volume of intake when drinking through straw. Suspect pharyngeal dysphagia with adequate swallow initiation and decreased laryngeal elevation. Coughing noted following drinks of thin liquids only. Recommend initiation of minced/moist diet and nectar thick liquids. Allow ice chips. Pt will require assistance with feeding. Follow aspiration precautions. SLP will follow. Recommendations/plan d/w RN.      ADMISSION DATE: 2022  ADMITTING DIAGNOSIS: has Altered mental status; Essential hypertension; Low back pain; Acquired hypothyroidism; Panic disorder; Dystonia; Chronic kidney disease (CKD) stage G3a/A3, moderately decreased glomerular filtration rate (GFR) between 45-59 mL/min/1.73 square meter and albuminuria creatinine ratio greater than 300 mg/g ([facility]  Type 2 diabetes mellitus without complication ([address] Ambulatory dysfunction; Non compliance w medication regimen; Hyperlipidemia; Closed fracture of distal end of fibula; Acute metabolic encephalopathy; SO (acute kidney injury) ([address] Encephalopathy acute; Closed displaced fracture of shaft of second metacarpal bone of right hand; Moderate episode of recurrent major depressive disorder ([address] Generalized anxiety disorder; ESRD (end stage renal disease) ([address] ESRD on hemodialysis ([address] AVF (arteriovenous fistula) ([address] Hyperkalemia; Chronic kidney disease, stage IV (severe) ([address] Acquired renal cyst; Allergic rhinitis; Anemia due to stage 4 chronic kidney disease ([address] Balance disorder; Elevated homocysteine; Gastroesophageal reflux disease; Impaired mobility; Insomnia due to other mental disorder; Iron deficiency; Memory impairment; Microalbuminuria due to type 2 diabetes mellitus ([address] Mononeuritis; Right inguinal hernia; Schizoaffective disorder ([address] Seasonal allergies; Severe episode of recurrent major depressive disorder, without psychotic features ([address] Spasmodic torticollis; Spinal stenosis of cervical region; Stenosis of both internal carotid arteries; Visual disturbance; Vitamin B12 deficiency; Vitamin D deficiency; and Pulmonary embolism and infarction ([address] on their problem list.  ONSET DATE: this admission    Recent Chest Xray/CT of Chest: see chart    Date of Eval: 1/13/2022  Evaluating Therapist: RAYMOND Rae    Current Diet level:  Current Diet : NPO  Current Liquid Diet : NPO      Primary Complaint  Patient Complaint: requested Pepsi    Pain:       Reason for Referral  Farhana Rangel was referred for a bedside swallow evaluation to assess the efficiency of his swallow function, identify signs and symptoms of aspiration and make recommendations regarding safe dietary consistencies, effective compensatory strategies, and safe eating environment.     Impression  Dysphagia Diagnosis: Moderate oral stage dysphagia;Mild to moderate pharyngeal stage dysphagia  Dysphagia Outcome Severity Scale: Level 4: Mild moderate dysphagia- Intermittent supervision/cueing. One - two diet consistencies restricted     Treatment Plan  Requires SLP Intervention: Yes  Duration/Frequency of Treatment: 2-3x/week for LOS  D/C Recommendations: To be determined       Recommended Diet and Intervention  Diet Solids Recommendation: Dysphagia Minced and Moist (Dysphagia II)  Liquid Consistency Recommendation: Mildly Thick (Nectar)  Recommended Form of Meds: PO     Therapeutic Interventions: Diet tolerance monitoring; Therapeutic PO trials with SLP;Patient/Family education    Compensatory Swallowing Strategies  Compensatory Swallowing Strategies: Upright as possible for all oral intake;Eat/Feed slowly; Small bites/sips;Assist feed    Treatment/Goals  Short-term Goals  Timeframe for Short-term Goals: length of admission  Goal 1: Pt will tolerate minced/moist diet and nectar thick liquids with adequate mastication/clearance and no s/s aspiration. Goal 2: Pt will tolerate PO trials of advanced textures with adequate oral manipulation/clearance and no s/s aspiration for safe diet upgrade. Goal 3: Pt/caregivers will indicate understanding of all recommendations. General  Chart Reviewed: Yes  Behavior/Cognition: Alert;Confused; Requires cueing; Cooperative  Respiratory Status: O2 via nasual cannula  O2 Device: Nasal cannula  Communication Observation:  (cognitive communication deficits)  Dentition: Edentulous  Patient Positioning: Upright in bed  Baseline Vocal Quality: Normal  Volitional Cough: Strong  Prior Dysphagia History: none known prior to admission  Consistencies Administered: Reg solid; Dysphagia Pureed (Dysphagia I); Thin - cup; Thin - straw; Thin - teaspoon;Nectar - straw; Ice Chips           Vision/Hearing  Hearing  Hearing:  (hard of hearing?)    Oral Motor Deficits  Oral/Motor  Oral Motor: Exceptions to [facility]    Oral Phase Dysfunction  Oral Phase  Oral Phase: Exceptions     Indicators of Pharyngeal Phase Dysfunction   Pharyngeal Phase  Pharyngeal Phase: Exceptions    Prognosis  Individuals consulted  Consulted and agree with results and recommendations: RN    Education  Patient Education: recommendations/plan  Patient Education Response: Needs reinforcement  Safety Devices in place: Yes  Type of devices:  All fall risk precautions in place       Therapy Time  SLP Individual Minutes  Time In: 0930  Time Out: 2086  Minutes: [address] [facility], [address] [place] [address] Road  1/13/2022 11:06 AM

## 2022-01-13 NOTE — CONSULTS
Nephrology Service Consultation    Patient:  Noah Pettit  MRN: [id number]  Consulting physician:  Yaniv Paulson MD  Reason for Consult: End-stage renal disease versus acute renal failure    History Obtained From:  patient, electronic medical record  PCP: Jose Juan Vinson MD    HISTORY OF PRESENT ILLNESS:   The patient is a 80 y.o. male who presents with weakness shortness of breath confusion from nursing home. Patient known to me with prior end-stage renal disease on dialysis but has been off for almost 6 to 9 months. I did call his daughter in [place] and updated her that he was here in the hospital.  When patient first presented concern for possible CVA but work-up showed patient had a COVID-19 pneumonia. Patient's work-up now also shows patient's hepatitis B surface antigen is positive which is new. On work-up initially patient had a CAT scan with contrast as well shows possible mild pulmonary embolus as well as COVID-19 pneumonia. The above setting patient weak lethargic does have a left AV fistula still in place and with worsening renal function and recent contrast we agreed that it was appropriate to start on dialysis to get fluid removal off. Concern for possible non-ST elevation MI. Patient is he is lucid has with some multiple bipolar disorder dementia. Currently not interactive or lucid enough to talk to me today.     Past Medical History:        Diagnosis Date    Anemia     Carotid stenosis     hx per old chart    CKD (chronic kidney disease)     stage 3 hx per old chart    Depression     Diabetes ([facility]     Dystonia per pt    Fracture     per old chart hx fx right hand metacarpal-(9/9/2019) scheduled for surgery 10/4/2019    GERD (gastroesophageal reflux disease)     Hyperlipidemia     Hypertension     Hypothyroidism     Metabolic encephalopathy     dx with admission 6/2018    Neuropathy     Non-smoker per pt    Poor historian     Schizoaffective disorder (HCC)     hx per old chart    Vitamin D deficiency     hx per old chart       Past Surgical History:        Procedure Laterality Date    ANKLE SURGERY Left 06/25/2016    O.RARIN Haynes Current BACK SURGERY      Cervical 3    COLONOSCOPY      ENDOSCOPY, COLON, DIAGNOSTIC      EYE SURGERY  09/2019    Left cataract surgery    FRACTURE SURGERY      Right Hand    HAND SURGERY Right 10/4/2019    HAND OPEN REDUCTION INTERNAL FIXATION RIGHT SECOND METACARPAL performed by Hayes Baumgarten, DO at [address] [place] [address] ([facility]) Right     Inguinal Hernia    TONSILLECTOMY         Medications:   Scheduled Meds:   ARIPiprazole  5 mg Oral Daily    aspirin  81 mg Oral Daily    atorvastatin  80 mg Oral Nightly    busPIRone  5 mg Oral BID    donepezil  10 mg Oral Nightly    fluticasone  2 spray Each Nostril Daily    insulin glargine  25 Units SubCUTAneous Nightly    levothyroxine  100 mcg Oral Daily    memantine  5 mg Oral BID    midodrine  5 mg Oral BID    morphine  15 mg Oral Daily    pantoprazole  40 mg Oral Daily    QUEtiapine  75 mg Oral BID    sertraline  50 mg Oral Daily    traZODone  75 mg Oral Nightly    vitamin D  2,000 Units Oral Daily    sodium chloride flush  5-40 mL IntraVENous 2 times per day    insulin lispro  0-12 Units SubCUTAneous Q4H    [START ON 1/14/2022] dexamethasone  6 mg IntraVENous Q24H     Continuous Infusions:   heparin (PORCINE) Infusion 18 Units/kg/hr (01/13/22 0840)    sodium chloride      dextrose       PRN Meds:.heparin (porcine), heparin (porcine), albuterol sulfate HFA, hydrOXYzine, melatonin, sodium chloride flush, sodium chloride, polyethylene glycol, acetaminophen **OR** acetaminophen, glucose, dextrose, glucagon (rDNA), dextrose, prochlorperazine    Allergies:  Amitriptyline, Codeine, Gabapentin, and Suprax [cefixime]    Social History:   TOBACCO:   reports that he has never smoked. He has never used smokeless tobacco.  ETOH:   reports no history of alcohol use.   OCCUPATION:      Family History: Problem Relation Age of Onset    Diabetes Father     High Cholesterol Father     Heart Disease Maternal Grandmother     Heart Disease Maternal Grandfather     Stroke Maternal Grandfather     Heart Disease Paternal Grandmother     Diabetes Paternal Grandfather     Heart Disease Paternal Grandfather     High Blood Pressure Paternal Grandfather     Depression Daughter         attempted suicide       REVIEW OF SYSTEMS:  Negative except for weak confused change in mental status. Physical Exam:    I/O: No intake/output data recorded. Vitals: BP (!) 80/46   Pulse 66   Temp 98.6 °F (37 °C) (Axillary)   Resp 15   Ht 5' 7\" (1.702 m)   Wt 168 lb (76.2 kg)   SpO2 94%   BMI 26.31 kg/m²   General appearance: Lethargic  HEENT: Head: Normal, normocephalic, atraumatic. Neck: supple, symmetrical, trachea midline  Lungs: diminished breath sounds bilaterally  Heart: S1, S2 normal  Abdomen: abnormal findings:  soft NT  Extremities: edema trace left AV fistula  Neurologic: Mental status: alertness: Lethargic      CBC:   Recent Labs     01/13/22  0250   WBC 4.0   HGB 8.1*   *     BMP:    Recent Labs     01/13/22  0250      K 5.0      CO2 16*   BUN 80*   CREATININE 4.7*   GLUCOSE 188*     Hepatic:   Recent Labs     01/13/22  0250   AST 72*   ALT 29   BILITOT 0.2   ALKPHOS 86     Troponin: No results for input(s): TROPONINI in the last 72 hours. BNP: No results for input(s): BNP in the last 72 hours. Lipids: No results for input(s): CHOL, HDL in the last 72 hours.     Invalid input(s): LDLCALCU  ABGs:   Lab Results   Component Value Date    PO2ART 97 01/13/2022    GIE8FOQ 39.0 01/13/2022     INR:   Recent Labs     01/13/22  0250   INR 1.13     Renal Labs  Albumin:    Lab Results   Component Value Date    LABALBU 3.5 01/13/2022    LABALBU 3.8 05/19/2021     Calcium:    Lab Results   Component Value Date    CALCIUM 7.8 01/13/2022     Phosphorus:    Lab Results   Component Value Date    PHOS 3.4 05/19/2021     U/A:    Lab Results   Component Value Date    NITRU NEGATIVE 05/01/2020    COLORU ELIDA 05/01/2020    WBCUA <1 05/01/2020    RBCUA 1 05/01/2020    MUCUS RARE 06/01/2018    TRICHOMONAS NONE SEEN 05/01/2020    BACTERIA NEGATIVE 05/01/2020    CLARITYU CLEAR 05/01/2020    SPECGRAV 1.015 05/01/2020    UROBILINOGEN NORMAL 05/01/2020    BILIRUBINUR NEGATIVE 05/01/2020    BLOODU SMALL 05/01/2020    KETUA NEGATIVE 05/01/2020     ABG:    Lab Results   Component Value Date    AYH6VAT 39.0 01/13/2022    PO2ART 97 01/13/2022    UWL5FOJ 17.1 01/13/2022     HgBA1c:    Lab Results   Component Value Date    LABA1C 7.4 05/02/2020     Microalbumen/Creatinine ratio:  No components found for: RUCREAT  TSH:  No results found for: TSH  IRON:    Lab Results   Component Value Date    IRON 42 05/04/2020     Iron Saturation:  No components found for: PERCENTFE  TIBC:    Lab Results   Component Value Date    TIBC 168 05/04/2020     FERRITIN:    Lab Results   Component Value Date    FERRITIN 1,627 01/13/2022     RPR:  No results found for: RPR  CE:  No results found for: ANATITER, CE  24 Hour Urine for Creatinine Clearance:  No components found for: CREAT4, UHRS10, UTV10  -----------------------------------------------------------------      Assessment and Recommendations     Patient Active Problem List   Diagnosis Code    Altered mental status R41.82    Essential hypertension I10    Low back pain M54.50    Acquired hypothyroidism E03.9    Panic disorder F41.0    Dystonia G24.9    Chronic kidney disease (CKD) stage G3a/A3, moderately decreased glomerular filtration rate (GFR) between 45-59 mL/min/1.73 square meter and albuminuria creatinine ratio greater than 300 mg/g ([facility]) N18.31    Type 2 diabetes mellitus without complication ([facility]) D04.2    Ambulatory dysfunction R26.2    Non compliance w medication regimen Z91.14    Hyperlipidemia E78.5    Closed fracture of distal end of fibula S82.033F    Acute metabolic encephalopathy G93.41    SO (acute kidney injury) ([facility] [address] N17.9    Encephalopathy acute G93.40    Closed displaced fracture of shaft of second metacarpal bone of right hand S62.320A    Moderate episode of recurrent major depressive disorder (HCC) F33.1    Generalized anxiety disorder F41.1    ESRD (end stage renal disease) ([facility] [address] N18.6    ESRD on hemodialysis (HCC) N18.6, Z99.2    AVF (arteriovenous fistula) ([facility]) I77.0    Hyperkalemia E87.5    Chronic kidney disease, stage IV (severe) (HCC) N18.4    Acquired renal cyst N28.1    Allergic rhinitis J30.9    Anemia due to stage 4 chronic kidney disease (HCC) N18.4, D63.1    Balance disorder R26.89    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    Gastroesophageal reflux disease K21.9    Impaired mobility Z74.09    Insomnia due to other mental disorder F51.05, F99    Iron deficiency E61.1    Memory impairment R41.3    Microalbuminuria due to type 2 diabetes mellitus ([facility]) E11.29, R80.9    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    Right inguinal hernia K40.90    Schizoaffective disorder ([facility]) F25.9    Seasonal allergies J30.2    Severe episode of recurrent major depressive disorder, without psychotic features ([facility] [address] F33.2    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    Spinal stenosis of cervical region M48.02    Stenosis of both internal carotid arteries I65.23    Visual disturbance H53.9    Vitamin B12 deficiency E53.8    Vitamin D deficiency E55.9    Pulmonary embolism and infarction (HCC) I26.99     Impression plan  #1 acute renal failure versus CKD 5 versus end-stage renal disease  #2 COVID-19 pneumonia  #3 positive pulm embolism  #4 hypotension  #5 hepatitis B surface antigen positive  #6 metabolic encephalopathy  #7 metabolic acidosis    Plan  #1 do dialysis today with left-sided AV fistula x1  #2 start protocol treatment for COVID-19  #3 maintain anticoagulation in setting of PE  #4 pressors as needed for blood pressure control  #5 hepatitis B antigen is a new finding will need to treat in isolation which he is already doing  #6 monitor neuro status and affect follow-up with neurology Alvino  #7 correct acidosis with dialysis  #8 I updated patient's daughter who is the POA will need to see if patient was doing anything else that would lead to change in mental status or just all related to COVID-19      Electronically signed by Valery Kay MD on 1/13/2022 at 2:44 PM

## 2022-01-13 NOTE — PROGRESS NOTES
Pt seen and examined and prior hd and off for few months and had cta chest and head and covid + . I dw daughter in [facility] aware now.  Will do hd with avf today and fu care  Full note to follow

## 2022-01-13 NOTE — CONSULTS
CARDIOLOGY CONSULT NOTE   Reason for consultation:  [address], PULMONARY EMBOLISM    Referring physician:  Pina Bell DO     Primary care physician: Rajesh Martinez MD      Dear   Thanks for the consult. History of present illness:Raghav is a 80 y. o.year old who  presents with altered mental status, found to have rt lower lobe segmental PE and covid 19,for shortness of breath which is mild, for few days intermittent, self limiting, not associated with cough or fever, gets worse with activity and better with rest,      Chief Complaint   Patient presents with    Altered Mental Status     last seen by nursing home staff at 830pm, 70% room air, 80% now on 15 L for EMS, narcan given and awoke a little more      Blood pressure, cholesterol, blood glucose and weight are well controlled. Past medical history:    has a past medical history of Anemia, Carotid stenosis, CKD (chronic kidney disease), Depression, Diabetes ([address] Dystonia, Fracture, GERD (gastroesophageal reflux disease), Hyperlipidemia, Hypertension, Hypothyroidism, Metabolic encephalopathy, Neuropathy, Non-smoker, Poor historian, Schizoaffective disorder ([address] and Vitamin D deficiency. Past surgical history:   has a past surgical history that includes Ankle surgery (Left, 06/25/2016); fracture surgery; back surgery; Colonoscopy; Endoscopy, colon, diagnostic; eye surgery (09/2019); hernia repair (Right); Tonsillectomy; and Hand surgery (Right, 10/4/2019). Social History:   reports that he has never smoked. He has never used smokeless tobacco. He reports that he does not drink alcohol and does not use drugs.   Family history:   no family history of CAD, STROKE of DM    Allergies   Allergen Reactions    Amitriptyline Other (See Comments)     sedation  Other reaction(s): sedation  sedation    Codeine      States have used this with no issues  Other reaction(s): \"crazy\"  States have used this with no issues    Gabapentin      Neuro toxicity myoclonus  Suprax [Cefixime] Other (See Comments)     Pain       heparin (porcine) injection 6,100 Units, PRN  heparin (porcine) injection 3,050 Units, PRN  heparin 25,000 units in dextrose 5% 250 mL (premix) infusion, Continuous  sodium bicarbonate 75 mEq in sodium chloride 0.45 % 1,000 mL infusion, Continuous      Current Facility-Administered Medications   Medication Dose Route Frequency Provider Last Rate Last Admin    heparin (porcine) injection 6,100 Units  80 Units/kg IntraVENous PRN Bronwyn Duarte PA-C        heparin (porcine) injection 3,050 Units  40 Units/kg IntraVENous PRN Korinne Lusterio, PA-C        heparin 25,000 units in dextrose 5% 250 mL (premix) infusion  5-30 Units/kg/hr IntraVENous Continuous Kashifinrohith Palacio PA-C 13.7 mL/hr at 01/13/22 0548 18 Units/kg/hr at 01/13/22 0548    sodium bicarbonate 75 mEq in sodium chloride 0.45 % 1,000 mL infusion   IntraVENous Continuous Winter Mcburney,  mL/hr at 01/13/22 0654 New Bag at 01/13/22 0654     Current Outpatient Medications   Medication Sig Dispense Refill    morphine (MS CONTIN) 15 MG extended release tablet Take 1 tablet by mouth daily.  sertraline (ZOLOFT) 50 MG tablet Take 1 tablet by mouth daily      vitamin D (CHOLECALCIFEROL) 50 MCG (2000 UT) TABS tablet Take 1 tablet by mouth daily      ARIPiprazole (ABILIFY) 5 MG tablet Take 5 mg by mouth daily      acetaminophen (TYLENOL 8 HOUR) 650 MG extended release tablet Take 650 mg by mouth every 6 hours as needed for Pain      ammonium lactate (LAC-HYDRIN) 12 % lotion Apply topically daily Apply topically as needed.  Multiple Vitamin (DAILY BOLA PO) Take 1 tablet by mouth daily      fluticasone (FLONASE) 50 MCG/ACT nasal spray 2 sprays by Each Nostril route daily      gabapentin (NEURONTIN) 300 MG capsule Take 300 mg by mouth 2 times daily.       SITagliptin (JANUVIA) 50 MG tablet Take 50 mg by mouth daily      insulin glargine (LANTUS) 100 UNIT/ML injection vial Inject 25 Units into the skin nightly      sodium zirconium cyclosilicate (LOKELMA) 10 g PACK oral suspension Take 10 g by mouth daily      loperamide (IMODIUM) 2 MG capsule Take 2 mg by mouth as needed for Diarrhea      midodrine (PROAMATINE) 5 MG tablet Take 5 mg by mouth 2 times daily      magnesium hydroxide (MILK OF MAGNESIA) 400 MG/5ML suspension Take by mouth 2 times daily as needed for Constipation      insulin aspart (NOVOLOG) 100 UNIT/ML injection vial Inject into the skin 3 times daily (before meals) Sliding scale dose      pantoprazole (PROTONIX) 40 MG tablet Take 40 mg by mouth daily      traZODone (DESYREL) 50 MG tablet Take 25 mg by mouth nightly      traMADol (ULTRAM) 50 MG tablet Take 50 mg by mouth 2 times daily as needed for Pain.       hydrOXYzine (ATARAX) 50 MG tablet Take 1 tablet by mouth daily as needed for Itching 30 tablet 3    Cholecalciferol (VITAMIN D3) 2000 units CAPS Take by mouth Daily      atorvastatin (LIPITOR) 80 MG tablet Take 80 mg by mouth daily Indications: high cholesterol      senna (SENOKOT) 8.6 MG tablet Take 2 tablets by mouth daily Indications: constipation      donepezil (ARICEPT) 10 MG tablet Take 10 mg by mouth nightly      escitalopram (LEXAPRO) 20 MG tablet Take 20 mg by mouth daily      QUEtiapine (SEROQUEL) 50 MG tablet Take 1 tablet by mouth nightly (Patient taking differently: Take 75 mg by mouth 2 times daily ) 60 tablet 3    aspirin 81 MG chewable tablet Take 1 tablet by mouth daily 30 tablet 0    busPIRone (BUSPAR) 5 MG tablet Take 1 tablet by mouth 2 times daily (Patient taking differently: Take 5 mg by mouth 3 times daily ) 30 tablet 0    levothyroxine (SYNTHROID) 100 MCG tablet Take 1 tablet by mouth Daily 30 tablet 0    memantine (NAMENDA) 5 MG tablet Take 1 tablet by mouth 2 times daily 60 tablet 0    melatonin 3 MG TABS tablet Take 1 tablet by mouth nightly as needed (sleep) (Patient taking differently: Take 10 mg by mouth nightly as needed (sleep) ) 30 tablet 0    albuterol sulfate  (90 BASE) MCG/ACT inhaler Inhale 2 puffs into the lungs every 6 hours as needed for Wheezing       Review of Systems:   · Constitutional: No Fever or Weight Loss   · Eyes: No Decreased Vision  · ENT: No Headaches, Hearing Loss or Vertigo  · Cardiovascular: No chest pain, dyspnea on exertion, palpitations or loss of consciousness  · Respiratory: No cough or wheezing    · Gastrointestinal: No abdominal pain, appetite loss, blood in stools, constipation, diarrhea or heartburn  · Genitourinary: No dysuria, trouble voiding, or hematuria  · Musculoskeletal:  No gait disturbance, weakness or joint complaints  · Integumentary: No rash or pruritis  · Neurological: No TIA or stroke symptoms  · Psychiatric: No anxiety or depression  · Endocrine: No malaise, fatigue or temperature intolerance  · Hematologic/Lymphatic: No bleeding problems, blood clots or swollen lymph nodes  · Allergic/Immunologic: No nasal congestion or hives  All systems negative except as marked. ·   ·      Physical Examination:    Vitals:    01/13/22 0727   BP: 130/69   Pulse: 89   Resp: 15   Temp: afebrile   SpO2: 100%      Wt Readings from Last 3 Encounters:   01/13/22 168 lb (76.2 kg)   09/08/21 167 lb 11.2 oz (76.1 kg)   06/09/21 167 lb 12.8 oz (76.1 kg)     Body mass index is 26.31 kg/m². General Appearance:  No distress, conversant    Constitutional:  Well developed, Well nourished, No acute distress, Non-toxic appearance. HENT:  Normocephalic, Atraumatic, Bilateral external ears normal, Oropharynx moist, No oral exudates, Nose normal. Neck- Normal range of motion, No tenderness, Supple, No stridor,no apical-carotid delay, no carotid bruit  Eyes:  PERRL, EOMI, Conjunctiva normal, No discharge. Respiratory:  Normal breath sounds, No respiratory distress, No wheezing, No chest tenderness. ,no use of accessory muscles, diaphragm movement is normal  Cardiovascular: (PMI) apex non displaced,no lifts no thrills, no s3,no s4, Normal heart rate, Normal rhythm, No murmurs, No rubs, No gallops. Carotid arteries pulse and amplitude are normal no bruit, no abdominal bruit noted ( normal abdominal aorta ausculation), femoral arteries pulse and amplitude are normal no bruit, pedal pulses are normal  GI:  Bowel sounds normal, Soft, No tenderness, No masses, No pulsatile masses, no hepatosplenomegally, no bruits  : External genitalia appear normal, No masses or lesions. No discharge. No CVA tenderness. Musculoskeletal:  Intact distal pulses, No edema, No tenderness, No cyanosis, No clubbing. Good range of motion in all major joints. No tenderness to palpation or major deformities noted. Back- No tenderness. Integument:  Warm, Dry, No erythema, No rash. Skin: no rash, no ulcers  Lymphatic:  No lymphadenopathy noted. Neurologic:  Alert & oriented x 3, Normal motor function, Normal sensory function, No focal deficits noted. Psychiatric:  Affect normal, Judgment normal, Mood normal.   Lab Review   Recent Labs     01/13/22  0250   WBC 4.0   HGB 8.1*   HCT 26.6*   *      Recent Labs     01/13/22  0250      K 5.0      CO2 16*   BUN 80*   CREATININE 4.7*     Recent Labs     01/13/22  0250   AST 72*   ALT 29   BILITOT 0.2   ALKPHOS 86     No results for input(s): TROPONINI in the last 72 hours. No results found for: BNP  Lab Results   Component Value Date    INR 1.13 01/13/2022    PROTIME 14.6 (H) 01/13/2022         EKG:nsr    Chest Xray:    ECHO:normal LVEF  Labs, echo, meds reviewed  Assessment: 80 y. o.year old with PMH of  has a past medical history of Anemia, Carotid stenosis, CKD (chronic kidney disease), Depression, Diabetes ([address] Dystonia, Fracture, GERD (gastroesophageal reflux disease), Hyperlipidemia, Hypertension, Hypothyroidism, Metabolic encephalopathy, Neuropathy, Non-smoker, Poor historian, Schizoaffective disorder ([address] and Vitamin D deficiency. Recommendations:    1. COVID 19\" recommend treatment as per protocol  2. Rt Lower lobe segmental PE: no need for EKOS, echho ordered to check for RV strain, recommend anticoagulation, patient has anemia also, start iIV hep  3. Dm: stable  4. Renal failure, acute, recommend IVF  5. Elevated trop\" not an ACS  All labs, medications and tests reviewed, continue all other medications of all above medical condition listed as is.          Rebekah Almonte MD, 1/13/2022 7:32 AM

## 2022-01-13 NOTE — PROGRESS NOTES
Subjective: Patient is a 28-year-old male with history of CKD, continuous opioid dependence, mood disorder, diabetes mellitus type 2, hypertension, GERD, Hypothyroidism and dementia who was admitted with unresponsiveness.     Patient was saturating in the 70s in the ER, pinpoint pupil, started on Narcan, oxygen, CT PE consistent with PE, heparin infusion started, positive for COVID  Admitted to ICU  Mentation improved        Vitals: Afebrile, heart rate 70s to 80s range, blood pressure 98/54,n, on 2 L of oxygen      Home medications: MS Contin, sertraline, Abilify, fluticasone, gabapentin, Sitagliptin, Lantus, Lokelma, loperamide, midodrine, pantoprazole, sliding scale insulin, tramadol, tramadol, hydroxyzine, Lipitor, Senokot, donepezil, Lexapro, Seroquel, Namenda      Current Medications: Abilify, aspirin, Lipitor, buspirone, dexamethasone, donepezil, Lantus, sliding scale insulin, levothyroxine, memantine, midodrine, MS Contin, Seroquel, trazodone      Labs: Sodium 138, potassium 5, chloride 1 3, bicarb 16, creatinine 4.7  Calcium 7.8  Troponin 0.  224  LFTs normal  WBC 4, hemoglobin 8.1, platelets 676    SARS-CoV-2 positive  Imaging: Chest x-ray shows mild right basilar opacity atelectasis versus pneumonia  CTA chest showed suspected right middle and lower lobe segmental and subsegmental pulmonary emboli, peripheral foci of consolidation consistent with COVID-19 pneumonia, distended gallbladder intra and extrahepatic dilatation mild    CTA neck  No large vessel occlusion, mild to moderate atherosclerotic disease      Plan:   Acute metabolic encephalopathy  Severe sepsis secondary to COVID-19 pneumonia with acute hypoxic respiratory failure  Pulmonary embolism bilateral  History of dementia  History of mood disorder  Chronic anemia  Mild elevated troponin  Continuous opioid dependence  End-stage renal disease  Diabetes mellitus type 2  Chronic hypertension  Hyperlipidemia  Hypothyroidism      Admitted secondary to syncope most likely secondary to PE, responded to Narcan  Hold MS Contin  Heparin infusion started, blood pressure low will hold antihypertensives, continue Namenda  Consider pressors if remains low  Dexamethasone started, add famotidine  If oxygen worsens will consult pharmacy to assess for baricitinib  Nephrology consulted          Patient admitted this morning we will continue to follow from tomorrow onwards

## 2022-01-13 NOTE — ED PROVIDER NOTES
Triage Chief Complaint:   Altered Mental Status (last seen by nursing home staff at 830pm, 70% room air, 80% now on 15 L for EMS, narcan given and awoke a little more )    "[employer]:  Sophy Low is a 80 y.o. male that presents via MS for altered mental status and hypoxemia. Per EMS, they were called to assisted living facility as patient was found unresponsive in his bed. Patient was last seen by nursing home staff at about 8:30 PM yesterday and he was at his baseline state of health at that time. On EMS arrival, they noted that he had agonal breathing, was 70% on room air and was placed onto 15 L nonrebreather and is 80%. EMS state that he had pinpoint pupils on arrival, they did give him 4 of Narcan and he became more responsive. Unknown if he is on any narcotics at home. Patient has mumbling unclear speech ED presentation, can tell me his name is Ameya Torres that his birthday is in [birth year]. He is otherwise not following any commands and unable to provide HPI. He is denying any pain. Past medical history includes chronic kidney disease with AV fistula to left upper arm, type 2 diabetes, memory impairment, schizoaffective disorder, hyperlipidemia, anemia, hypertension, hyperlipidemia. On chart review, follows with Dr Deanna Alvarenga with nephrology. Is on aspirin, no other blood thinners on medication list from nursing facility. ROS: Very limited HPI given patient's altered mental status. At least 10 systems reviewed and otherwise acutely negative except as in the [address]     Past Medical History:   Diagnosis Date    Anemia     Carotid stenosis     hx per old chart    CKD (chronic kidney disease)     stage 3 hx per old chart    Depression     Diabetes ([facility] [address]     Dystonia per pt    Fracture     per old chart hx fx right hand metacarpal-(9/9/2019) scheduled for surgery 10/4/2019    GERD (gastroesophageal reflux disease)     Hyperlipidemia     Hypertension     Hypothyroidism     Metabolic encephalopathy dx with admission 6/2018    Neuropathy     Non-smoker per pt    Poor historian     Schizoaffective disorder ([facility] [address]     hx per old chart    Vitamin D deficiency     hx per old chart     Past Surgical History:   Procedure Laterality Date    ANKLE SURGERY Left 06/25/2016    [facility] BACK SURGERY      Cervical 3    COLONOSCOPY      ENDOSCOPY, COLON, DIAGNOSTIC      EYE SURGERY  09/2019    Left cataract surgery    FRACTURE SURGERY      Right Hand    HAND SURGERY Right 10/4/2019    HAND OPEN REDUCTION INTERNAL FIXATION RIGHT SECOND METACARPAL performed by Carlos Lundy DO at [address] Right     Inguinal Hernia    TONSILLECTOMY       Family History   Problem Relation Age of Onset    Diabetes Father     High Cholesterol Father     Heart Disease Maternal Grandmother     Heart Disease Maternal Grandfather     Stroke Maternal Grandfather     Heart Disease Paternal Grandmother     Diabetes Paternal Grandfather     Heart Disease Paternal Grandfather     High Blood Pressure Paternal Grandfather     Depression Daughter         attempted suicide     Social History     Socioeconomic History    Marital status:      Spouse name: Not on file    Number of children: Not on file    Years of education: Not on file    Highest education level: Not on file   Occupational History    Not on file   Tobacco Use    Smoking status: Never Smoker    Smokeless tobacco: Never Used   Vaping Use    Vaping Use: Never used   Substance and Sexual Activity    Alcohol use: No     Alcohol/week: 0.0 standard drinks    Drug use: No    Sexual activity: Not on file   Other Topics Concern    Not on file   Social History Narrative    Not on file     Social Determinants of Health     Financial Resource Strain:     Difficulty of Paying Living Expenses: Not on file   Food Insecurity:     Worried About Running Out of Food in the Last Year: Not on file    Renny of Food in the Last Year: Not on file Transportation Needs:     Lack of Transportation (Medical): Not on file    Lack of Transportation (Non-Medical): Not on file   Physical Activity:     Days of Exercise per Week: Not on file    Minutes of Exercise per Session: Not on file   Stress:     Feeling of Stress : Not on file   Social Connections:     Frequency of Communication with Friends and Family: Not on file    Frequency of Social Gatherings with Friends and Family: Not on file    Attends [employer] Services: Not on file    Active Member of [address] [place] [address] or Organizations: Not on file    Attends Club or Organization Meetings: Not on file    Marital Status: Not on file   Intimate Partner Violence:     Fear of Current or Ex-Partner: Not on file    Emotionally Abused: Not on file    Physically Abused: Not on file    Sexually Abused: Not on file   Housing Stability:     Unable to Pay for Housing in the Last Year: Not on file    Number of [place] in the Last Year: Not on file    Unstable Housing in the Last Year: Not on file     Current Facility-Administered Medications   Medication Dose Route Frequency Provider Last Rate Last Admin    heparin (porcine) injection 6,100 Units  80 Units/kg IntraVENous PRN Ángela Chin PA-C        heparin (porcine) injection 3,050 Units  40 Units/kg IntraVENous PRN Ángela Chin PA-C        heparin 25,000 units in dextrose 5% 250 mL (premix) infusion  5-30 Units/kg/hr IntraVENous Continuous Korinne Lusterio, PA-C 13.7 mL/hr at 01/13/22 0548 18 Units/kg/hr at 01/13/22 0548    sodium bicarbonate 75 mEq in sodium chloride 0.9 % 1,000 mL infusion   IntraVENous Continuous Bina Cruz DO         Current Outpatient Medications   Medication Sig Dispense Refill    morphine (MS CONTIN) 15 MG extended release tablet Take 1 tablet by mouth daily.       sertraline (ZOLOFT) 50 MG tablet Take 1 tablet by mouth daily      vitamin D (CHOLECALCIFEROL) 50 MCG (2000 UT) TABS tablet Take 1 tablet by mouth daily  ARIPiprazole (ABILIFY) 5 MG tablet Take 5 mg by mouth daily      acetaminophen (TYLENOL 8 HOUR) 650 MG extended release tablet Take 650 mg by mouth every 6 hours as needed for Pain      ammonium lactate (LAC-HYDRIN) 12 % lotion Apply topically daily Apply topically as needed.  Multiple Vitamin (DAILY BOLA PO) Take 1 tablet by mouth daily      fluticasone (FLONASE) 50 MCG/ACT nasal spray 2 sprays by Each Nostril route daily      gabapentin (NEURONTIN) 300 MG capsule Take 300 mg by mouth 2 times daily.  SITagliptin (JANUVIA) 50 MG tablet Take 50 mg by mouth daily      insulin glargine (LANTUS) 100 UNIT/ML injection vial Inject 25 Units into the skin nightly      sodium zirconium cyclosilicate (LOKELMA) 10 g PACK oral suspension Take 10 g by mouth daily      loperamide (IMODIUM) 2 MG capsule Take 2 mg by mouth as needed for Diarrhea      midodrine (PROAMATINE) 5 MG tablet Take 5 mg by mouth 2 times daily      magnesium hydroxide (MILK OF MAGNESIA) 400 MG/5ML suspension Take by mouth 2 times daily as needed for Constipation      insulin aspart (NOVOLOG) 100 UNIT/ML injection vial Inject into the skin 3 times daily (before meals) Sliding scale dose      pantoprazole (PROTONIX) 40 MG tablet Take 40 mg by mouth daily      traZODone (DESYREL) 50 MG tablet Take 25 mg by mouth nightly      traMADol (ULTRAM) 50 MG tablet Take 50 mg by mouth 2 times daily as needed for Pain.       hydrOXYzine (ATARAX) 50 MG tablet Take 1 tablet by mouth daily as needed for Itching 30 tablet 3    Cholecalciferol (VITAMIN D3) 2000 units CAPS Take by mouth Daily      atorvastatin (LIPITOR) 80 MG tablet Take 80 mg by mouth daily Indications: high cholesterol      senna (SENOKOT) 8.6 MG tablet Take 2 tablets by mouth daily Indications: constipation      donepezil (ARICEPT) 10 MG tablet Take 10 mg by mouth nightly      escitalopram (LEXAPRO) 20 MG tablet Take 20 mg by mouth daily      QUEtiapine (SEROQUEL) 50 MG tablet Take 1 tablet by mouth nightly (Patient taking differently: Take 75 mg by mouth 2 times daily ) 60 tablet 3    aspirin 81 MG chewable tablet Take 1 tablet by mouth daily 30 tablet 0    busPIRone (BUSPAR) 5 MG tablet Take 1 tablet by mouth 2 times daily (Patient taking differently: Take 5 mg by mouth 3 times daily ) 30 tablet 0    levothyroxine (SYNTHROID) 100 MCG tablet Take 1 tablet by mouth Daily 30 tablet 0    memantine (NAMENDA) 5 MG tablet Take 1 tablet by mouth 2 times daily 60 tablet 0    melatonin 3 MG TABS tablet Take 1 tablet by mouth nightly as needed (sleep) (Patient taking differently: Take 10 mg by mouth nightly as needed (sleep) ) 30 tablet 0    albuterol sulfate  (90 BASE) MCG/ACT inhaler Inhale 2 puffs into the lungs every 6 hours as needed for Wheezing       Allergies   Allergen Reactions    Amitriptyline Other (See Comments)     sedation  Other reaction(s): sedation  sedation    Codeine      States have used this with no issues  Other reaction(s): \"crazy\"  States have used this with no issues    Gabapentin      Neuro toxicity myoclonus     Suprax [Cefixime] Other (See Comments)     Pain       Nursing Notes Reviewed    Physical Exam:  ED Triage Vitals [01/13/22 0204]   Enc Vitals Group      /69      Pulse 89      Resp 14      Temp       Temp Source Oral      SpO2 98 %      Weight 168 lb (76.2 kg)      Height 5' 7\" (1.702 m)      Head Circumference       Peak Flow       Pain Score       Pain Loc       Pain Edu? Excl. in [address]? GENERAL APPEARANCE: Awake blankly staring  HEAD: Normocephalic. Atraumatic. EYES: EOM's grossly intact. Sclera anicteric. Pupils equally round and reactive  ENT: Dry mucous membranes. NECK: Supple. No meningismus. Trachea midline. HEART: RRR. Radial pulses 2+. LUNGS: Respirations unlabored. 98% on 15 L nonrebreather. Diminished equal air exchange bilaterally. No stridor. ABDOMEN: Soft. Non-tender.  No guarding or rebound. EXTREMITIES: No acute deformities. Fistula noted to the left upper arm with palpable thrill. SKIN: Warm and dry. NEUROLOGICAL: Patient laying with eyes open, staring blankly, mumbling unintelligible speech. When asked what his name is he does answer \"Raghav. \"  When asked what his birthday is he can tell me that it is in [place]. \"  He states that he can feel pain but is otherwise not following any commands. Spontaneously moving his upper and lower extremities but not to command. Very limited neuro exam given patient's altered mental status. Is responsive to pain. When asked if he can hear me, he does nod head.   I have reviewed and interpreted all of the currently available lab results from this visit (if applicable):  Results for orders placed or performed during the hospital encounter of 01/13/22   Respiratory Panel, Molecular, with COVID-19 (Restricted: peds pts or suitable admitted adults)    Specimen: Nasopharyngeal   Result Value Ref Range    Adenovirus Detection by PCR NOT DETECTED NOT DETECTED    Coronavirus 229E PCR NOT DETECTED NOT DETECTED    Coronavirus HKU1 PCR NOT DETECTED NOT DETECTED    Coronavirus NL63 PCR NOT DETECTED NOT DETECTED    Coronavirus OC43 PCR NOT DETECTED NOT DETECTED    SARS-CoV-2 DETECTED BY PCR (A) NOT DETECTED    Human Metapneumovirus PCR NOT DETECTED NOT DETECTED    Rhinovirus Enterovirus PCR NOT DETECTED NOT DETECTED    Influenza A by PCR NOT DETECTED NOT DETECTED    Influenza A H1 Pandemic PCR NOT DETECTED NOT DETECTED    Influenza A H1 (2009) PCR NOT DETECTED NOT DETECTED    Influenza A H3 PCR NOT DETECTED NOT DETECTED    Influenza B by PCR NOT DETECTED NOT DETECTED    Parainfluenza 1 PCR NOT DETECTED NOT DETECTED    Parainfluenza 2 PCR NOT DETECTED NOT DETECTED    Parainfluenza 3 PCR NOT DETECTED NOT DETECTED    Parainfluenza 4 PCR NOT DETECTED NOT DETECTED    RSV PCR NOT DETECTED NOT DETECTED    Bordetella parapertussis by PCR NOT DETECTED NOT DETECTED    B Pertussis by PCR NOT DETECTED NOT DETECTED    Chlamydophila Pneumonia PCR NOT DETECTED NOT DETECTED    Mycoplasma pneumo by PCR NOT DETECTED NOT DETECTED   CBC Auto Differential   Result Value Ref Range    WBC 4.0 4.0 - 10.5 K/CU MM    RBC 2.90 (L) 4.6 - 6.2 M/CU MM    Hemoglobin 8.1 (L) 13.5 - 18.0 GM/DL    Hematocrit 26.6 (L) 42 - 52 %    MCV 91.7 78 - 100 FL    MCH 27.9 27 - 31 PG    MCHC 30.5 (L) 32.0 - 36.0 %    RDW 14.0 11.7 - 14.9 %    Platelets 370 (L) 809 - 440 K/CU MM    MPV 10.6 7.5 - 11.1 FL    Differential Type AUTOMATED DIFFERENTIAL     Segs Relative 83.6 (H) 36 - 66 %    Lymphocytes % 10.5 (L) 24 - 44 %    Monocytes % 5.2 (H) 0 - 4 %    Eosinophils % 0.0 0 - 3 %    Basophils % 0.0 0 - 1 %    Segs Absolute 3.4 K/CU MM    Lymphocytes Absolute 0.4 K/CU MM    Monocytes Absolute 0.2 K/CU MM    Eosinophils Absolute 0.0 K/CU MM    Basophils Absolute 0.0 K/CU MM    Nucleated RBC % 0.0 %    Total Nucleated RBC 0.0 K/CU MM    Total Immature Neutrophil 0.03 K/CU MM    Immature Neutrophil % 0.7 (H) 0 - 0.43 %   Comprehensive Metabolic Panel   Result Value Ref Range    Sodium 138 135 - 145 MMOL/L    Potassium 5.0 3.5 - 5.1 MMOL/L    Chloride 103 99 - 110 mMol/L    CO2 16 (L) 21 - 32 MMOL/L    BUN 80 (H) 6 - 23 MG/DL    CREATININE 4.7 (H) 0.9 - 1.3 MG/DL    Glucose 188 (H) 70 - 99 MG/DL    Calcium 7.8 (L) 8.3 - 10.6 MG/DL    Albumin 3.5 3.4 - 5.0 GM/DL    Total Protein 6.0 (L) 6.4 - 8.2 GM/DL    Total Bilirubin 0.2 0.0 - 1.0 MG/DL    ALT 29 10 - 40 U/L    AST 72 (H) 15 - 37 IU/L    Alkaline Phosphatase 86 40 - 128 IU/L    GFR Non- 12 (L) >60 mL/min/1.73m2    GFR  15 (L) >60 mL/min/1.73m2    Anion Gap 19 (H) 4 - 16   Troponin   Result Value Ref Range    Troponin T 0.224 (HH) <0.01 NG/ML   Brain Natriuretic Peptide   Result Value Ref Range    Pro-.8 (H) <300 PG/ML   Blood Gas, Venous   Result Value Ref Range    pH, Moses 7.18 (L) 7.32 - 7.42    pCO2, Moses 49 38 - 52 mmHG    pO2, Moses 80 (H) 28 - 48 mmHG    Base Excess 10 (H) 0 - 3.3    HCO3, Venous 18.3 (L) 19 - 25 MMOL/L    O2 Sat, Moses 93.9 (H) 50 - 70 %    Comment VBG    Ethanol   Result Value Ref Range    Alcohol Scrn <0.01 <4.47 %WT/VOL   Salicylate   Result Value Ref Range    Salicylate Lvl <3.8 (L) 15 - 30 MG/DL    DOSE AMOUNT DOSE AMT. GIVEN - UNKNOWN     DOSE TIME DOSE TIME GIVEN - UNKNOWN    Acetaminophen Level   Result Value Ref Range    Acetaminophen Level <5.0 (L) 15 - 30 ug/ml    DOSE AMOUNT DOSE AMT. GIVEN - UNKNOWN     DOSE TIME DOSE TIME GIVEN - UNKNOWN    Lactic Acid, Plasma   Result Value Ref Range    Lactate 0.6 0.4 - 2.0 mMOL/L   Beta-Hydroxybutyrate   Result Value Ref Range    Beta-Hydroxybutyrate >20.8 (H) 0.0 - 3.0 MG/DL   Protime-INR   Result Value Ref Range    Protime 14.6 (H) 11.7 - 14.5 SECONDS    INR 1.13 INDEX   POCT Glucose   Result Value Ref Range    POC Glucose 205 (H) 70 - 99 MG/DL   EKG 12 Lead   Result Value Ref Range    Ventricular Rate 95 BPM    Atrial Rate 95 BPM    P-R Interval 190 ms    QRS Duration 130 ms    Q-T Interval 404 ms    QTc Calculation (Bazett) 507 ms    P Axis 33 degrees    R Axis -34 degrees    T Axis -13 degrees    Diagnosis       Normal sinus rhythm  Left axis deviation  Right bundle branch block  Abnormal ECG  When compared with ECG of 04-JUN-2018 16:55,  No significant change was found          Radiographs (if obtained):  [] The following radiograph was interpreted by myself in the absence of a radiologist:  [x] Radiologist's Report Reviewed:          XR CHEST PORTABLE (Preliminary result)  Result time 01/13/22 03:56:26  Preliminary result by Wilder Barraza MD (01/13/22 03:56:26)                Impression:    Mild right basilar opacity.  Considerations include atelectasis and mild   pneumonia.      Mild cardiac silhouette enlargement.  Mild central vascular congestion.                       CTA CHEST W CONTRAST (Preliminary result)  Result time 01/13/22 03:49:21  Preliminary result by Aleksandr Grimaldo MD (01/13/22 03:49:21)                Impression:    1. Suspected small right middle lobe segmental and subsegmental pulmonary   embolus.  Motion significantly degrades the quality of the exam.   2. Small foci of peripheral consolidation in the upper lobes.  Consider mild   COVID pneumonia. 3. Mild bibasilar atelectasis or pneumonia. 4. Splenomegaly. 5. Distended gallbladder.  Mild intra and extrahepatic biliary ductal   dilatation. This report was discussed with Dr. India Cuello (Final result)  Result time 01/13/22 03:27:31  Final result by Andrew Henderson DO (01/13/22 03:27:31)                Impression:    1.  No large vessel occlusion in the head or neck. 2.  Mild-to-moderate atherosclerotic disease. 3.  Scattered heterogeneous opacities in the visualized lung apices may   relate to an atypical infectious/inflammatory process including atypical or   viral/COVID-19 pneumonia versus mild pulmonary edema. 4.  C4-C5 osseous sclerosis and endplate erosions may relate to advanced   degenerative changes.  Underlying discitis/osteomyelitis is difficult to   entirely exclude and could be further evaluated with follow-up cervical spine   MRI. Narrative:    EXAMINATION:   CTA OF THE HEAD AND NECK WITH CONTRAST 1/13/2022 2:36 am:     TECHNIQUE:   CTA of the head and neck was performed with the administration of intravenous   contrast. Multiplanar reformatted images are provided for review.  MIP images   are provided for review. Stenosis of the internal carotid arteries measured   using NASCET criteria. Dose modulation, iterative reconstruction, and/or   weight based adjustment of the mA/kV was utilized to reduce the radiation   dose to as low as reasonably achievable. COMPARISON:   Noncontrast CT head done same day.      HISTORY:   ORDERING SYSTEM PROVIDED HISTORY: Stroke Symptoms   TECHNOLOGIST PROVIDED HISTORY:   Has a \"code stroke\" or \"stroke alert\" been called? ->Yes   Reason for exam:->Stroke Symptoms   Decision Support Exception - unselect if not a suspected or confirmed   emergency medical condition->Emergency Medical Condition (MA)   Reason for Exam: Stroke Symptoms     FINDINGS:     CTA NECK:     AORTIC ARCH/ARCH VESSELS: No dissection or arterial injury.  No significant   stenosis of the brachiocephalic or subclavian arteries.  Atherosclerosis in   the visualized thoracic aorta and bilateral subclavian arteries. CAROTID ARTERIES: No dissection, arterial injury, or hemodynamically   significant stenosis by NASCET criteria.  Bilateral carotid bulb   atherosclerotic plaque. VERTEBRAL ARTERIES: No dissection, arterial injury, or significant stenosis. SOFT TISSUES: The visualized lung apices demonstrate bilateral heterogeneous   opacities which may represent an infectious/inflammatory process including   atypical or viral/COVID-19 pneumonia versus mild pulmonary edema.  No   cervical or superior mediastinal lymphadenopathy.  The larynx and pharynx are   unremarkable.  No acute abnormality of the salivary and thyroid glands. Visualized esophagus is patulous.  Please refer to concurrent chest CT report   for additional findings. BONES: No acute osseous abnormality.  Multilevel degenerative changes in the   visualized spine.  Diffusely heterogeneous marrow may relate to osteopenia. Underlying marrow replacing process is difficult to exclude.  Osseous fusion   across the C5-C6 disc space.  C4-C5 osseous sclerosis and endplate erosions   may relate to advanced degenerative changes. CTA HEAD:     ANTERIOR CIRCULATION: No significant stenosis of the intracranial internal   carotid, anterior cerebral, or middle cerebral arteries. No aneurysm. Atherosclerosis in the bilateral intracranial internal carotid arteries.      POSTERIOR CIRCULATION: No significant stenosis of the vertebral, basilar, or   posterior cerebral arteries. No aneurysm. OTHER: No dural venous sinus thrombosis on this non-dedicated study. BRAIN: No mass effect or midline shift. No extra-axial fluid collection. The   gray-white differentiation is maintained.                       CT HEAD WO CONTRAST (Final result)  Result time 01/13/22 02:42:53  Final result by Jacquelyn Deleon (01/13/22 02:42:53)                Impression:    No acute intracranial hemorrhage, mass effect, midline shift, or   hydrocephalus.  Streak artifact over the frontal lobes but no sign of an   acute territorial infarct. Volume loss with prominence of the ventricles and sulci are stable. Critical results were called by Dr. Jacquelyn Deleon MD to Dr Gallito Pacheco   on 1/13/2022 at 02:42. Narrative:    EXAMINATION:   CT OF THE HEAD WITHOUT CONTRAST  1/13/2022 2:20 am     TECHNIQUE:   CT of the head was performed without the administration of intravenous   contrast. Dose modulation, iterative reconstruction, and/or weight based   adjustment of the mA/kV was utilized to reduce the radiation dose to as low   as reasonably achievable. COMPARISON:   05/01/2020     HISTORY:   ORDERING SYSTEM PROVIDED HISTORY: Stroke Symptoms   TECHNOLOGIST PROVIDED HISTORY:   Has a \"code stroke\" or \"stroke alert\" been called? ->Yes   Reason for exam:->Stroke Symptoms   Decision Support Exception - unselect if not a suspected or confirmed   emergency medical condition->Emergency Medical Condition (MA)   Reason for Exam: Stroke Symptoms     FINDINGS:   BRAIN/VENTRICLES: There is no acute intraparenchymal hemorrhage, mass effect,   midline shift, or extra-axial fluid collection.  Streak artifact over the   anterior frontal lobe but the gray-white matter differentiation is preserved. Some mild chronic ischemic changes are suggested in the periventricular white   matter.  The ventricles and sulci are stable.  Atherosclerotic calcifications   are present.      ORBITS: The visualized portion of the orbits demonstrate no acute   abnormality.  Previous cataract surgery. SINUSES: Mucosal thickening in the ethmoid air cells and mildly at the   sphenoid sinuses. SOFT TISSUES/SKULL:  No acute abnormality of the visualized skull or soft   tissues. EKG Interpretation  Please see ED physician's note - Dr. Carlota Aden - for EKG interpretation    Clinical Impression:  1. Acute respiratory failure, unspecified whether with hypoxia or hypercapnia ([address]    2. Altered mental status, unspecified altered mental status type    3. Single subsegmental pulmonary embolism without acute cor pulmonale (HCC)    4. Elevated troponin    5. Pneumonia due to COVID-19 virus       Patient presents from assisted living facility for altered mental status and acute hypoxia. On exam, patient is on high flow nonrebreather, 98% on room air. Patient has stable blood pressure, is not tachycardic. He has mumbling indecipherable speech, is not following commands well but is having spontaneous movement of the extremities. He does answer some questions. Given patient's last known well of 8:30 PM prior to ED arrival, stroke alert was initiated and he was brought emergently back for CTA imaging. Given his hypoxemia, I did extend CT imaging to include his chest.  Patient was noted to have a fistula to the left upper extremity, follows with Dr. Bessy Cade but unknown when his last dialysis was. Accu-Chek is 200 here. CBC shows normal white count hemoglobin is 8.1 which is appears stable to his baseline. CMP does reveal SO with BUN/creatinine of 80/4.7 with a CO2 of 16 and anion gap of 19. Otherwise normal potassium. Troponin is elevated 0.224 with a BNP of 972. Alcohol, Tylenol and aspirin levels are normal.  Normal lactic acid. Venous blood gas does show a pH of 7.18 with normal PCO2 but a low bicarb of 18. Respiratory disease panel pending at time of dictation. CT head noncontrast was negative. CTA chest abdomen pelvis shows suspected small right middle lobe segmental and subsegmental pulmonary embolus however motion degrades quality of exam.  There is also concerning foci of peripheral consolidation in the upper lobes concerning for mild COVID-pneumonia. Mildly distended gallbladder with mild intra and extrahepatic biliary ductal dilatation but otherwise patient had normal bilirubin LFTs. CTA head and neck revealed no large vessel occlusion but there is mild to moderate atherosclerotic disease. I did send a perfect serve text message to patient's nephrologist, Dr. Cash Lyle given administration of contrast imaging for stroke alert. He is familiar with the patient, states that patient has been off of dialysis for the last several months and had missed an appointment in office today. At this time, presentation is concerning for a metabolic acidosis and metabolic encephalopathy. In light of the dialysis history, ED attending did call stroke alert and patient was not evaluated by stroke neurologist.  We will plan to consult with hospitalist for ICU admission. Did start patient on IV heparin given the PE and elevated troponin and anticipate consult with cardiology while admitted. I did consult with Dr. Nancy Triana - hospitalist - and discussed patient's history, ED presentation/course including any pertinent laboratory findings and imaging study findings. He/she agrees to hospital admission. Patient is admitted to the hospital in stable condition. MDM:  1. Physician evaluation performed at:  0205am  2. Stroke alert called at:  0211  3. CT results obtained at:  0242am negative head CT noncontrast results  4. Given patient's lab abnormalities and CTA chest findings, stroke alert was cancelled by ED attending and patient was not seen by [facility] stroke neurologist  5.  Patient will be dispositioned to hospitalist      I did discuss this patient's history, ED presentation/course with my attending physician - Dr. Yasemin Barron - who has also seen and evaluated this patient. He/she does agree that admission is reasonable at this time. Please see his/her note for additional details of their evaluation. CRITICAL CARE NOTE:  There was a high probability of clinically significant life-threatening deterioration of the patient's condition requiring my urgent intervention due to acute mental status, acute respiratory failure. IV fluids, high flow nasal cannula, telemetry, continuous pulse ox monitoring, Decadron, heparin, consultation with specialty services, stroke alert, chart review was performed to address this. Total critical care time is at least  31 minutes. This includes vital sign monitoring, pulse oximetry monitoring, telemetry monitoring, clinical response to the IV medications, reviewing the nursing notes, consultation time, dictation/documentation time, and interpretation of the lab work. This time excludes time spent performing procedures and separately billable procedures and family discussion time.     (Please note that portions of this note may have been completed with a voice recognition program. Efforts were made to edit the dictations but occasionally words are mis-transcribed.)    LUCIANO Alvarez PA-C  01/13/22 [id number]

## 2022-01-13 NOTE — H&P
Patient seen and examined after midnight    Patient from nursing facility was found to be unresponsive and hypoxic in the 70s. Patient was brought to the ED and stroke alert was called. NIH was greater than 20. Patient mumbling and not cooperating. Moving arms and legs purposefully per ED provider without result. [facility] teleneurology was canceled secondary to large NIH score. Patient was found to have a pulmonary embolus on exam and started on heparin drip. Respiratory disease panel positive for COVID-19. Patient was acidotic on VBG likely secondary to missed dialysis sessions. Patient per ED provider had not been for months. Patient is uremic. Patient has an elevated troponin at 0.224. Patient is encephalopathic with baseline memory disturbances. Overall patient is a very sick patient. Nephrology plans to dialyze patient, we will consult cardiology and neurology as well. In short patient has acute respiratory failure secondary to COVID-19 pneumonia and has SO on CKD as well as pulmonary embolus and NSTEMI. Patient has pending inflammatory markers for day team to evaluate for possible monoclonal antibodies. Full H&P to follow.     Electronically signed by Yoli Denise DO on 1/13/2022 at 5:56 AM

## 2022-01-13 NOTE — H&P
History and Physical      Name:  Leonel May /Age/Sex: 1940  (80 y.o. male)   MRN & CSN:  [id number] & [id number] Admission Date/Time: 2022  2:04 AM   Location:  [place] [address] PCP: Erwin Douglas, [address] Day: 1    Assessment and Plan:   Leonel May is a 80 y.o.  male  who presents with Pulmonary embolism and infarction ([facility] [address]    1. Acute hypoxemic  respiratory failure, multifactorial  2. Acute pulmonary embolus, right  3. Elevated troponin, likely from above  4. Acute metabolic encephalopathy, secondary to above  -Admit to ICU with continuous pulse oximetry monitoring and telemetry  -Hypoxemia multifactorial to include PE and COVID-19  -CTA chest with contrast: Suspected small right middle lobe segmental and subsegmental pulmonary embolus. Small foci of peripheral consolidation in the upper lobes. Consider mild COVID-pneumonia. For further detail please see below  -ED started pulmonary embolus Heparin drip protocol, continue  -Initial troponin 0.224, cycle troponins x2  -proBNP 972.8  -Risk factors for pulmonary embolus are multifactorial  -Encephalopathy multifactorial.  -Cannot rule out TIA. Stroke alert was initially called in ED.  -Consult nephrology, appreciate recommendations    5. COVID-19 pneumonia  6. Sepsis, secondary to above  7.  Elevated anion gap, likely secondary to lactic acidosis and uremia  -COVID-19 symptoms: Unknown  -COVID-19 test positive date: 2022  -Vaccination status: Fully vaccinated without booster  -Sepsis present  -CTA pulmonary: As above  -COVID-19 lab work and COVID-19 precautions  -Blood cultures, sputum culture, pneumococcal antigen, legionella urine antigen, and procalcitonin  -Continue nasal canula oxygen  -Antibiotics: Not indicated  -Mucinex daily, encourage deep breathing and coughing, and incentive spirometry  -Tylenol prn for fever  -IV Decadron was given in ED and we will continue with with this  -Inflammatory markers pending, day team to evaluate for monoclonal antibody therapy    8. 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 Question if this is SO on CKD stage V or if patient is developing ESRD. Patient has had dialysis in the past and does have a left AV fistula. -Did receive contrasted study in ED  -Follow Cr with daily labs and monitor I & O  -ED consulted nephrology, who states he will likely dialyze in a.m.    9. Thrombocytopenia  -Initial platelets 860, monitor platelets closely given on heparin    10. IDDM 2  -Continue home basal insulin, ADULT DIET; Dysphagia - Minced and Moist; Mildly Thick (Nectar) diet, medium SSI + BG checks ACHS, hypoglycemic protocol, and target -180    Other chronic medical conditions:   Continue all home meds except stated above or contraindicated.  Schizoaffective   Hypothyroidism   HTN   HLD   GERD   Dementia   Depression with anxiety    Diet Diet NPO   DVT Prophylaxis [] Lovenox, [x]  Heparin, [] SCDs, [] Ambulation   GI Prophylaxis [x] PPI,  [] H2 Blocker,  [] Carafate,  [] Diet/Tube Feeds   Code Status Prior   Disposition Patient requires continued admission due to Pulmonary embolism and infarction ([address]   MDM [] Low, [] Moderate,[x]  High  Patient's risk as above due to acuity of condition with potential for decompensation. History of Present Illness:     Chief Complaint: Pulmonary embolism and infarction ([address]    Adam Vanessa is a 80 y.o.  male with a reviewed and a contributory family history of HTN, DM, HLD, CVA, and heart disease and a PMH as stated above, who presents from skilled nursing with worsening mental status and hypoxia with saturations at 70% on room air. Patient was found unresponsive in his bed and EMS was called. Patient was noted to have agonal breathing and to be hypoxic. Patient is mumbling and unintelligible but not following commands or providing any meaningful history.   Patient was found to have COVID-19 pneumonia, sepsis, pulmonary embolus, elevated troponins, and renal failure. No further history was obtained. Please see ED providers HPI for further details. Discussed case with ED provider. ROS:   Review of Systems   Unable to perform ROS: Mental status change       Objective:   No intake or output data in the 24 hours ending 01/13/22 0547   Vitals:   Vitals:    01/13/22 0403   BP:    Pulse:    Resp:    Temp: 98.8 °F (37.1 °C)   SpO2:      /69   Pulse 89   Temp 98.8 °F (37.1 °C) (Oral)   Resp 14   Ht 5' 7\" (1.702 m)   Wt 168 lb (76.2 kg)   SpO2 98%   BMI 26.31 kg/m²   Physical Exam:   Physical Exam  Vitals and nursing note reviewed. Constitutional:       General: He is awake. He is not in acute distress. Appearance: Normal appearance. He is overweight. He is ill-appearing. He is not toxic-appearing or diaphoretic. Interventions: Nasal cannula in place. HENT:      Head: No raccoon eyes or Hughes's sign. Right Ear: External ear normal.      Left Ear: External ear normal.      Nose: Nose normal. No rhinorrhea. Mouth/Throat:      Mouth: Mucous membranes are pale and dry. Tongue: Tongue does not deviate from midline. Pharynx: Uvula midline. Eyes:      Extraocular Movements: Extraocular movements intact. Right eye: No nystagmus. Left eye: No nystagmus. Pupils: Pupils are equal, round, and reactive to light. Cardiovascular:      Rate and Rhythm: Normal rate and regular rhythm. Pulses: Normal pulses. Heart sounds: Normal heart sounds. No murmur heard. No gallop. Arteriovenous access: left arteriovenous access is present. Pulmonary:      Effort: Respiratory distress (resolved) present. No tachypnea. Breath sounds: Decreased air movement present. No wheezing, rhonchi or rales. Abdominal:      General: Abdomen is flat. Bowel sounds are decreased. There is no distension. Palpations: Abdomen is soft. Tenderness: There is generalized abdominal tenderness. There is no guarding or rebound. Musculoskeletal:         General: Normal range of motion. Cervical back: Neck supple. Right lower leg: No edema. Left lower leg: No edema. Skin:     General: Skin is warm and dry. Capillary Refill: Capillary refill takes less than 2 seconds. Coloration: Skin is pale. Neurological:      Mental Status: He is lethargic, disoriented and confused. Cranial Nerves: Cranial nerve deficit present. No dysarthria or facial asymmetry. Sensory: Sensory deficit present. Motor: Tremor present. No weakness or seizure activity. Psychiatric:         Cognition and Memory: Memory is impaired. Past Medical History:      Past Medical History:   Diagnosis Date    Anemia     Carotid stenosis     hx per old chart    CKD (chronic kidney disease)     stage 3 hx per old chart    Depression     Diabetes ([facility] [address]     Dystonia per pt    Fracture     per old chart hx fx right hand metacarpal-(9/9/2019) scheduled for surgery 10/4/2019    GERD (gastroesophageal reflux disease)     Hyperlipidemia     Hypertension     Hypothyroidism     Metabolic encephalopathy     dx with admission 6/2018    Neuropathy     Non-smoker per pt    Poor historian     Schizoaffective disorder ([facility] [address]     hx per old chart    Vitamin D deficiency     hx per old chart     PSHX:  has a past surgical history that includes Ankle surgery (Left, 06/25/2016); fracture surgery; back surgery; Colonoscopy; Endoscopy, colon, diagnostic; eye surgery (09/2019); hernia repair (Right); Tonsillectomy; and Hand surgery (Right, 10/4/2019). Allergies:    Allergies   Allergen Reactions    Amitriptyline Other (See Comments)     sedation  Other reaction(s): sedation  sedation    Codeine      States have used this with no issues  Other reaction(s): \"crazy\"  States have used this with no issues    Gabapentin      Neuro toxicity myoclonus     Suprax [Cefixime] Other (See Comments)     Pain FAM HX: family history includes Depression in his daughter; Diabetes in his father and paternal grandfather; Heart Disease in his maternal grandfather, maternal grandmother, paternal grandfather, and paternal grandmother; High Blood Pressure in his paternal grandfather; High Cholesterol in his father; Stroke in his maternal grandfather. Soc HX:   Social History     Socioeconomic History    Marital status:      Spouse name: None    Number of children: None    Years of education: None    Highest education level: None   Occupational History    None   Tobacco Use    Smoking status: Never Smoker    Smokeless tobacco: Never Used   Vaping Use    Vaping Use: Never used   Substance and Sexual Activity    Alcohol use: No     Alcohol/week: 0.0 standard drinks    Drug use: No    Sexual activity: None   Other Topics Concern    None   Social History Narrative    None     Social Determinants of Health     Financial Resource Strain:     Difficulty of Paying Living Expenses: Not on file   Food Insecurity:     Worried About Running Out of Food in the Last Year: Not on file    Renny of Food in the Last Year: Not on file   Transportation Needs:     Lack of Transportation (Medical): Not on file    Lack of Transportation (Non-Medical):  Not on file   Physical Activity:     Days of Exercise per Week: Not on file    Minutes of Exercise per Session: Not on file   Stress:     Feeling of Stress : Not on file   Social Connections:     Frequency of Communication with Friends and Family: Not on file    Frequency of Social Gatherings with Friends and Family: Not on file    Attends Jew Services: Not on file    Active Member of Clubs or Organizations: Not on file    Attends Club or Organization Meetings: Not on file    Marital Status: Not on file   Intimate Partner Violence:     Fear of Current or Ex-Partner: Not on file    Emotionally Abused: Not on file    Physically Abused: Not on file   Solomon Sexually Abused: Not on file   Housing Stability:     Unable to Pay for Housing in the Last Year: Not on file    Number of Places Lived in the Last Year: Not on file    Unstable Housing in the Last Year: Not on file       Data:   CBC with Differential:    Lab Results   Component Value Date    WBC 4.0 01/13/2022    RBC 2.90 01/13/2022    HGB 8.1 01/13/2022    HCT 26.6 01/13/2022     01/13/2022    MCV 91.7 01/13/2022    MCH 27.9 01/13/2022    MCHC 30.5 01/13/2022    RDW 14.0 01/13/2022    SEGSPCT 83.6 01/13/2022    LYMPHOPCT 10.5 01/13/2022    MONOPCT 5.2 01/13/2022    EOSPCT 4.4 05/19/2021    BASOPCT 0.0 01/13/2022    MONOSABS 0.2 01/13/2022    LYMPHSABS 0.4 01/13/2022    EOSABS 0.0 01/13/2022    BASOSABS 0.0 01/13/2022    DIFFTYPE AUTOMATED DIFFERENTIAL 01/13/2022       CMP:     Lab Results   Component Value Date     01/13/2022    K 5.0 01/13/2022     01/13/2022    CO2 16 01/13/2022    BUN 80 01/13/2022    CREATININE 4.7 01/13/2022    GFRAA 15 01/13/2022    LABGLOM 12 01/13/2022    GLUCOSE 188 01/13/2022    PROT 6.0 01/13/2022    PROT 7.4 08/16/2011    LABALBU 3.5 01/13/2022    LABALBU 3.8 05/19/2021    CALCIUM 7.8 01/13/2022    BILITOT 0.2 01/13/2022    ALKPHOS 86 01/13/2022    AST 72 01/13/2022    ALT 29 01/13/2022       Troponin:  Lab Results   Component Value Date    TROPONINT 0.224 01/13/2022     Radiology results:  XR CHEST PORTABLE   Preliminary Result   Mild right basilar opacity. Considerations include atelectasis and mild   pneumonia. Mild cardiac silhouette enlargement. Mild central vascular congestion. CTA CHEST W CONTRAST   Preliminary Result   1. Suspected small right middle lobe segmental and subsegmental pulmonary   embolus. Motion significantly degrades the quality of the exam.   2. Small foci of peripheral consolidation in the upper lobes. Consider mild   COVID pneumonia. 3. Mild bibasilar atelectasis or pneumonia. 4. Splenomegaly.    5. Distended gallbladder. Mild intra and extrahepatic biliary ductal   dilatation. This report was discussed with Dr. Nakia Eduardo   Final Result   1. No large vessel occlusion in the head or neck. 2.  Mild-to-moderate atherosclerotic disease. 3.  Scattered heterogeneous opacities in the visualized lung apices may   relate to an atypical infectious/inflammatory process including atypical or   viral/COVID-19 pneumonia versus mild pulmonary edema. 4.  C4-C5 osseous sclerosis and endplate erosions may relate to advanced   degenerative changes. Underlying discitis/osteomyelitis is difficult to   entirely exclude and could be further evaluated with follow-up cervical spine   MRI. CT HEAD WO CONTRAST   Final Result   No acute intracranial hemorrhage, mass effect, midline shift, or   hydrocephalus. Streak artifact over the frontal lobes but no sign of an   acute territorial infarct. Volume loss with prominence of the ventricles and sulci are stable. Critical results were called by Dr. Emy Higuera MD to Dr Yemi Morales   on 1/13/2022 at 02:42. Medications:   Home Medications:   Prior to Admission medications    Medication Sig Start Date End Date Taking? Authorizing Provider   morphine (MS CONTIN) 15 MG extended release tablet Take 1 tablet by mouth daily. 1/8/22   Historical Provider, MD   sertraline (ZOLOFT) 50 MG tablet Take 1 tablet by mouth daily 12/13/21   Historical Provider, MD   vitamin D (CHOLECALCIFEROL) 50 MCG (2000 UT) TABS tablet Take 1 tablet by mouth daily 12/20/21   Historical Provider, MD   ARIPiprazole (ABILIFY) 5 MG tablet Take 5 mg by mouth daily    Historical Provider, MD   acetaminophen (TYLENOL 8 HOUR) 650 MG extended release tablet Take 650 mg by mouth every 6 hours as needed for Pain    Historical Provider, MD   ammonium lactate (LAC-HYDRIN) 12 % lotion Apply topically daily Apply topically as needed.     Historical Provider, MD Multiple Vitamin (DAILY BOLA PO) Take 1 tablet by mouth daily    Historical Provider, MD   fluticasone (FLONASE) 50 MCG/ACT nasal spray 2 sprays by Each Nostril route daily    Historical Provider, MD   gabapentin (NEURONTIN) 300 MG capsule Take 300 mg by mouth 2 times daily. Historical Provider, MD   SITagliptin (JANUVIA) 50 MG tablet Take 50 mg by mouth daily    Historical Provider, MD   insulin glargine (LANTUS) 100 UNIT/ML injection vial Inject 25 Units into the skin nightly    Historical Provider, MD   sodium zirconium cyclosilicate (LOKELMA) 10 g PACK oral suspension Take 10 g by mouth daily    Historical Provider, MD   loperamide (IMODIUM) 2 MG capsule Take 2 mg by mouth as needed for Diarrhea    Historical Provider, MD   midodrine (PROAMATINE) 5 MG tablet Take 5 mg by mouth 2 times daily    Historical Provider, MD   magnesium hydroxide (MILK OF MAGNESIA) 400 MG/5ML suspension Take by mouth 2 times daily as needed for Constipation    Historical Provider, MD   insulin aspart (NOVOLOG) 100 UNIT/ML injection vial Inject into the skin 3 times daily (before meals) Sliding scale dose    Historical Provider, MD   pantoprazole (PROTONIX) 40 MG tablet Take 40 mg by mouth daily    Historical Provider, MD   traZODone (DESYREL) 50 MG tablet Take 25 mg by mouth nightly    Historical Provider, MD   traMADol (ULTRAM) 50 MG tablet Take 50 mg by mouth 2 times daily as needed for Pain.     Historical Provider, MD   hydrOXYzine (ATARAX) 50 MG tablet Take 1 tablet by mouth daily as needed for Itching 6/9/21   Melissa Drivers, MD   Cholecalciferol (VITAMIN D3) 2000 units CAPS Take by mouth Daily    Historical Provider, MD   atorvastatin (LIPITOR) 80 MG tablet Take 80 mg by mouth daily Indications: high cholesterol    Historical Provider, MD   senna (SENOKOT) 8.6 MG tablet Take 2 tablets by mouth daily Indications: constipation    Historical Provider, MD   donepezil (ARICEPT) 10 MG tablet Take 10 mg by mouth nightly Historical Provider, MD   escitalopram (LEXAPRO) 20 MG tablet Take 20 mg by mouth daily    Historical Provider, MD   QUEtiapine (SEROQUEL) 50 MG tablet Take 1 tablet by mouth nightly  Patient taking differently: Take 75 mg by mouth 2 times daily  11/27/17   Naveed Suarez MD   aspirin 81 MG chewable tablet Take 1 tablet by mouth daily 5/17/17   Shanelle Smith MD   busPIRone (BUSPAR) 5 MG tablet Take 1 tablet by mouth 2 times daily  Patient taking differently: Take 5 mg by mouth 3 times daily  5/17/17   Shanelle Smith MD   levothyroxine (SYNTHROID) 100 MCG tablet Take 1 tablet by mouth Daily 5/18/17   Shanelle Smith MD   memantine (NAMENDA) 5 MG tablet Take 1 tablet by mouth 2 times daily 5/17/17   Shanelle Smith MD   melatonin 3 MG TABS tablet Take 1 tablet by mouth nightly as needed (sleep)  Patient taking differently: Take 10 mg by mouth nightly as needed (sleep)  5/17/17   Shanelle Smith MD   albuterol sulfate  (90 BASE) MCG/ACT inhaler Inhale 2 puffs into the lungs every 6 hours as needed for Wheezing    Historical Provider, MD     Medications:    dexamethasone  8 mg IntraVENous Once      Infusions:    heparin (PORCINE) Infusion      IV infusion builder       PRN Meds: heparin (porcine), 80 Units/kg, PRN  heparin (porcine), 40 Units/kg, PRN        Electronically signed by [facility]  on 1/13/2022 at 5:47 AM      This dictation was created with voice recognition software. While attempts have been made to review the dictation as it is transcribed, on occasion the spoken word can be misinterpreted by the technology leading to omissions or inappropriate words, phrases or sentences.

## 2022-01-13 NOTE — ED PROVIDER NOTES
I independently examined and evaluated Keenan1 Renetta Mendoza. In brief, 72-year-old male presents with altered mental status. He reportedly had acute change in his mental status at around 8:30 PM.  He does have an assisted living facility. His last known well was 8:30 PM.  Reportedly became hypoxic and difficult to arouse. He had oxygen saturations that were in the 80s. He was placed on increased oxygen and brought to the emergency department for evaluation. It was difficult to get a good neurological exam on patient. He does appear to be answering some questions appropriately. He does appear to be grossly moving extremities though he potentially seems more weak on the left. He did present within 4 hours of his reported last known well so stroke alert was called. He has no external signs of trauma. Does have extensive psychiatric history. Related to obtain some confirmation from his nephrologist that he has been noncompliant recently with his dialysis. Focused exam revealed no external signs of trauma, answering some questions, but arousable. I do appreciate some movement in all extremities. He has poor  strength bilaterally. Not resisting gravity with any extremities. .    ED course: Patient reportedly had a last known well of 8:30 PM.  Lives at an assisted living facility. He has a significant psych history is also history of ESRD on hemodialysis. We did later receive some collateral information from his nephrologist that he has been noncompliant recently with his dialysis. He presented for altered mental status and hypoxia. He was on supplemental oxygen in the emergency department with improvement in his oxygenation numbers. He had a difficult to assess neurological exam.  He did appear to be answering some questions and following some commands. We did call stroke alert given that we had a ast known well and he was within 24 hours of reported symptoms onset.   There did not seem to be significant focality in the symptoms at this time. Once we also learned of his noncompliance with dialysis it seems more likely that his symptoms are metabolic in nature due to an etiology like uremia. We did obtain labs as well as CT and CTA head and neck. CT head and CTA head and neck did not have any signs of stroke or large vessel occlusion. Stroke alert is cancelled given the negative imaging as well as the clinical signs consistent with metabolic encephalopathy/possible uremia. His labs do show SO with a serum creatinine of 4.7. Values from several months ago were around 2.5. He has ketotic as well as an elevated anion gap of 19. Troponin is elevated at 0.224. EKG is nonischemic. White count is 4. We will do a CTA of his chest given his hypoxia and it is concerning for possible segmental and subsegmental PE. The study was of poor quality because of patient motion. He will be started on heparin. His nephrologist is consulted. He will be admitted for his encephalopathy as well as the PE and his dialysis noncompliance. He will be started on heparin gtt for the PE. The elevated troponin seems to be type 2 likely 2/2 dialysis noncompliance and the PE.    EKG is interpreted by me. EKG shows sinus rhythm at 95 bpm, axis is left deviated, no remarkable ST segment ovation's or depressions, there is a V prime wave in V1 consistent with a right bundle branch block, W[place] interval 190, QRS duration of 130, QTc of 507. Final impression, right bundle branch block, nonspecific EKG. From view of records, prior EKGs have shown right bundle branch block. Total critical care time today provided was 20 minutes. This excludes seperately billable procedure. Critical care time provided for encephalopathy, stroke protocol, respiratory distress that required close evaluation and/or intervention with concern for patient decompensation.         All diagnostic, treatment, and disposition decisions were made by myself in

## 2022-01-13 NOTE — PROGRESS NOTES
Pt arrived to room 2003 via cart with RN. Pt is lethargic and oriented to self and place. Pt has boxer underwear with him and no other belongings. Pt has small area of redness on buttocks. Pt also has yellow drainage, new and dry around eyes. Pt has small healing scab to left knee and right LE shin. Two RN skin check completed by Cindy LOPEZ and [address] [place] [address].

## 2022-01-14 LAB
ALBUMIN SERPL-MCNC: 3.8 GM/DL (ref 3.4–5)
ALBUMIN SERPL-MCNC: 3.8 GM/DL (ref 3.4–5)
ALP BLD-CCNC: 79 IU/L (ref 40–129)
ALT SERPL-CCNC: 28 U/L (ref 10–40)
ANION GAP SERPL CALCULATED.3IONS-SCNC: 16 MMOL/L (ref 4–16)
APTT: >240 SECONDS (ref 25.1–37.1)
APTT: >240 SECONDS (ref 25.1–37.1)
AST SERPL-CCNC: 60 IU/L (ref 15–37)
BILIRUB SERPL-MCNC: 0.3 MG/DL (ref 0–1)
BILIRUBIN DIRECT: 0.2 MG/DL (ref 0–0.3)
BILIRUBIN, INDIRECT: 0.1 MG/DL (ref 0–0.7)
BUN BLDV-MCNC: 62 MG/DL (ref 6–23)
CALCIUM SERPL-MCNC: 8 MG/DL (ref 8.3–10.6)
CHLORIDE BLD-SCNC: 103 MMOL/L (ref 99–110)
CO2: 21 MMOL/L (ref 21–32)
CREAT SERPL-MCNC: 3.4 MG/DL (ref 0.9–1.3)
EKG ATRIAL RATE: 95 BPM
EKG DIAGNOSIS: NORMAL
EKG P AXIS: 33 DEGREES
EKG P-R INTERVAL: 190 MS
EKG Q-T INTERVAL: 404 MS
EKG QRS DURATION: 130 MS
EKG QTC CALCULATION (BAZETT): 507 MS
EKG R AXIS: -34 DEGREES
EKG T AXIS: -13 DEGREES
EKG VENTRICULAR RATE: 95 BPM
GFR AFRICAN AMERICAN: 21 ML/MIN/1.73M2
GFR NON-AFRICAN AMERICAN: 18 ML/MIN/1.73M2
GLUCOSE BLD-MCNC: 132 MG/DL (ref 70–99)
GLUCOSE BLD-MCNC: 153 MG/DL (ref 70–99)
GLUCOSE BLD-MCNC: 217 MG/DL (ref 70–99)
GLUCOSE BLD-MCNC: 229 MG/DL (ref 70–99)
HCT VFR BLD CALC: 25.4 % (ref 42–52)
HEMOGLOBIN: 8 GM/DL (ref 13.5–18)
HIGH SENSITIVE C-REACTIVE PROTEIN: 92.9 MG/L
LV EF: 53 %
LVEF MODALITY: NORMAL
MCH RBC QN AUTO: 28.2 PG (ref 27–31)
MCHC RBC AUTO-ENTMCNC: 31.5 % (ref 32–36)
MCV RBC AUTO: 89.4 FL (ref 78–100)
PDW BLD-RTO: 14.2 % (ref 11.7–14.9)
PHOSPHORUS: 4.3 MG/DL (ref 2.5–4.9)
PLATELET # BLD: 147 K/CU MM (ref 140–440)
PMV BLD AUTO: 11.1 FL (ref 7.5–11.1)
POTASSIUM SERPL-SCNC: 4.4 MMOL/L (ref 3.5–5.1)
RBC # BLD: 2.84 M/CU MM (ref 4.6–6.2)
SODIUM BLD-SCNC: 140 MMOL/L (ref 135–145)
TOTAL PROTEIN: 5.9 GM/DL (ref 6.4–8.2)
VITAMIN D 25-HYDROXY: 26.99 NG/ML
WBC # BLD: 5.8 K/CU MM (ref 4–10.5)

## 2022-01-14 PROCEDURE — 6360000002 HC RX W HCPCS: Performed by: STUDENT IN AN ORGANIZED HEALTH CARE EDUCATION/TRAINING PROGRAM

## 2022-01-14 PROCEDURE — 6360000002 HC RX W HCPCS: Performed by: HOSPITALIST

## 2022-01-14 PROCEDURE — 6370000000 HC RX 637 (ALT 250 FOR IP): Performed by: STUDENT IN AN ORGANIZED HEALTH CARE EDUCATION/TRAINING PROGRAM

## 2022-01-14 PROCEDURE — 51798 US URINE CAPACITY MEASURE: CPT

## 2022-01-14 PROCEDURE — 36415 COLL VENOUS BLD VENIPUNCTURE: CPT

## 2022-01-14 PROCEDURE — 80053 COMPREHEN METABOLIC PANEL: CPT

## 2022-01-14 PROCEDURE — 5A1D70Z PERFORMANCE OF URINARY FILTRATION, INTERMITTENT, LESS THAN 6 HOURS PER DAY: ICD-10-PCS | Performed by: INTERNAL MEDICINE

## 2022-01-14 PROCEDURE — 2700000000 HC OXYGEN THERAPY PER DAY

## 2022-01-14 PROCEDURE — 94761 N-INVAS EAR/PLS OXIMETRY MLT: CPT

## 2022-01-14 PROCEDURE — 82248 BILIRUBIN DIRECT: CPT

## 2022-01-14 PROCEDURE — 1200000000 HC SEMI PRIVATE

## 2022-01-14 PROCEDURE — 2580000003 HC RX 258: Performed by: STUDENT IN AN ORGANIZED HEALTH CARE EDUCATION/TRAINING PROGRAM

## 2022-01-14 PROCEDURE — 85730 THROMBOPLASTIN TIME PARTIAL: CPT

## 2022-01-14 PROCEDURE — 85027 COMPLETE CBC AUTOMATED: CPT

## 2022-01-14 PROCEDURE — 2580000003 HC RX 258: Performed by: HOSPITALIST

## 2022-01-14 PROCEDURE — 99233 SBSQ HOSP IP/OBS HIGH 50: CPT | Performed by: INTERNAL MEDICINE

## 2022-01-14 PROCEDURE — 82962 GLUCOSE BLOOD TEST: CPT

## 2022-01-14 PROCEDURE — 82306 VITAMIN D 25 HYDROXY: CPT

## 2022-01-14 PROCEDURE — 92526 ORAL FUNCTION THERAPY: CPT

## 2022-01-14 PROCEDURE — 85379 FIBRIN DEGRADATION QUANT: CPT

## 2022-01-14 PROCEDURE — 86141 C-REACTIVE PROTEIN HS: CPT

## 2022-01-14 PROCEDURE — 6370000000 HC RX 637 (ALT 250 FOR IP): Performed by: HOSPITALIST

## 2022-01-14 PROCEDURE — 84100 ASSAY OF PHOSPHORUS: CPT

## 2022-01-14 PROCEDURE — 93306 TTE W/DOPPLER COMPLETE: CPT

## 2022-01-14 PROCEDURE — 93010 ELECTROCARDIOGRAM REPORT: CPT | Performed by: INTERNAL MEDICINE

## 2022-01-14 RX ORDER — TAMSULOSIN HYDROCHLORIDE 0.4 MG/1
0.4 CAPSULE ORAL DAILY
Status: DISCONTINUED | OUTPATIENT
Start: 2022-01-14 | End: 2022-01-20 | Stop reason: HOSPADM

## 2022-01-14 RX ADMIN — MEMANTINE HYDROCHLORIDE 5 MG: 5 TABLET ORAL at 22:12

## 2022-01-14 RX ADMIN — TRAZODONE HYDROCHLORIDE 75 MG: 50 TABLET ORAL at 22:12

## 2022-01-14 RX ADMIN — ATORVASTATIN CALCIUM 80 MG: 40 TABLET, FILM COATED ORAL at 22:12

## 2022-01-14 RX ADMIN — ACETAMINOPHEN 650 MG: 325 TABLET ORAL at 12:35

## 2022-01-14 RX ADMIN — ACETAMINOPHEN 650 MG: 325 TABLET ORAL at 04:25

## 2022-01-14 RX ADMIN — SODIUM CHLORIDE, PRESERVATIVE FREE 10 ML: 5 INJECTION INTRAVENOUS at 08:38

## 2022-01-14 RX ADMIN — DEXAMETHASONE SODIUM PHOSPHATE 6 MG: 10 INJECTION INTRAMUSCULAR; INTRAVENOUS at 08:41

## 2022-01-14 RX ADMIN — BUSPIRONE HYDROCHLORIDE 5 MG: 5 TABLET ORAL at 22:12

## 2022-01-14 RX ADMIN — PANTOPRAZOLE SODIUM 40 MG: 40 TABLET, DELAYED RELEASE ORAL at 08:39

## 2022-01-14 RX ADMIN — MORPHINE SULFATE 15 MG: 15 TABLET, FILM COATED, EXTENDED RELEASE ORAL at 08:40

## 2022-01-14 RX ADMIN — SODIUM CHLORIDE, PRESERVATIVE FREE 10 ML: 5 INJECTION INTRAVENOUS at 22:13

## 2022-01-14 RX ADMIN — SERTRALINE HYDROCHLORIDE 50 MG: 50 TABLET ORAL at 14:03

## 2022-01-14 RX ADMIN — VANCOMYCIN HYDROCHLORIDE 1500 MG: 5 INJECTION, POWDER, LYOPHILIZED, FOR SOLUTION INTRAVENOUS at 12:45

## 2022-01-14 RX ADMIN — MIDODRINE HYDROCHLORIDE 5 MG: 5 TABLET ORAL at 09:02

## 2022-01-14 RX ADMIN — QUETIAPINE FUMARATE 75 MG: 25 TABLET ORAL at 22:12

## 2022-01-14 RX ADMIN — LEVOTHYROXINE SODIUM 100 MCG: 0.1 TABLET ORAL at 08:50

## 2022-01-14 RX ADMIN — INSULIN GLARGINE 25 UNITS: 100 INJECTION, SOLUTION SUBCUTANEOUS at 22:13

## 2022-01-14 RX ADMIN — ARIPIPRAZOLE 5 MG: 5 TABLET ORAL at 08:41

## 2022-01-14 RX ADMIN — TAMSULOSIN HYDROCHLORIDE 0.4 MG: 0.4 CAPSULE ORAL at 22:12

## 2022-01-14 RX ADMIN — DONEPEZIL HYDROCHLORIDE 10 MG: 5 TABLET, FILM COATED ORAL at 22:12

## 2022-01-14 RX ADMIN — MEMANTINE HYDROCHLORIDE 5 MG: 5 TABLET ORAL at 08:41

## 2022-01-14 RX ADMIN — ACETAMINOPHEN 650 MG: 325 TABLET ORAL at 22:12

## 2022-01-14 RX ADMIN — QUETIAPINE FUMARATE 75 MG: 25 TABLET ORAL at 08:40

## 2022-01-14 RX ADMIN — Medication 2000 UNITS: at 08:40

## 2022-01-14 RX ADMIN — FLUTICASONE PROPIONATE 2 SPRAY: 50 SPRAY, METERED NASAL at 09:02

## 2022-01-14 RX ADMIN — APIXABAN 10 MG: 5 TABLET, FILM COATED ORAL at 22:13

## 2022-01-14 RX ADMIN — BUSPIRONE HYDROCHLORIDE 5 MG: 5 TABLET ORAL at 08:40

## 2022-01-14 RX ADMIN — MIDODRINE HYDROCHLORIDE 5 MG: 5 TABLET ORAL at 22:12

## 2022-01-14 RX ADMIN — ASPIRIN 81 MG CHEWABLE TABLET 81 MG: 81 TABLET CHEWABLE at 08:40

## 2022-01-14 ASSESSMENT — PAIN DESCRIPTION - ONSET: ONSET: ON-GOING

## 2022-01-14 ASSESSMENT — PAIN SCALES - GENERAL
PAINLEVEL_OUTOF10: 9
PAINLEVEL_OUTOF10: 2
PAINLEVEL_OUTOF10: 4
PAINLEVEL_OUTOF10: 5
PAINLEVEL_OUTOF10: 8
PAINLEVEL_OUTOF10: 9
PAINLEVEL_OUTOF10: 3

## 2022-01-14 ASSESSMENT — PAIN DESCRIPTION - LOCATION: LOCATION: NECK

## 2022-01-14 ASSESSMENT — PAIN DESCRIPTION - PAIN TYPE: TYPE: CHRONIC PAIN

## 2022-01-14 ASSESSMENT — PAIN DESCRIPTION - FREQUENCY: FREQUENCY: CONTINUOUS

## 2022-01-14 ASSESSMENT — PAIN - FUNCTIONAL ASSESSMENT: PAIN_FUNCTIONAL_ASSESSMENT: ACTIVITIES ARE NOT PREVENTED

## 2022-01-14 ASSESSMENT — PAIN DESCRIPTION - PROGRESSION: CLINICAL_PROGRESSION: NOT CHANGED

## 2022-01-14 ASSESSMENT — PAIN DESCRIPTION - DESCRIPTORS: DESCRIPTORS: CONSTANT;DISCOMFORT

## 2022-01-14 NOTE — PROGRESS NOTES
Today's plan: ECHO ordered to check RV strain and LVEF,       Admit Date:  1/13/2022    Subjective:opk      Chief complaints on admission  Chief Complaint   Patient presents with    Altered Mental Status     last seen by nursing home staff at 830pm, 70% room air, 80% now on 15 L for EMS, narcan given and awoke a little more          History of present illness:Raghav is a 80 y. o.year old who  presents with had concerns including Altered Mental Status (last seen by nursing home staff at 830pm, 70% room air, 80% now on 15 L for EMS, narcan given and awoke a little more ). Past medical history:    has a past medical history of Anemia, Carotid stenosis, CKD (chronic kidney disease), Depression, Diabetes ([address] Dystonia, Fracture, GERD (gastroesophageal reflux disease), Hyperlipidemia, Hypertension, Hypothyroidism, Metabolic encephalopathy, Neuropathy, Non-smoker, Poor historian, Schizoaffective disorder ([address] and Vitamin D deficiency. Past surgical history:   has a past surgical history that includes Ankle surgery (Left, 06/25/2016); fracture surgery; back surgery; Colonoscopy; Endoscopy, colon, diagnostic; eye surgery (09/2019); hernia repair (Right); Tonsillectomy; and Hand surgery (Right, 10/4/2019). Social History:   reports that he has never smoked. He has never used smokeless tobacco. He reports that he does not drink alcohol and does not use drugs. Family history:  family history includes Depression in his daughter; Diabetes in his father and paternal grandfather; Heart Disease in his maternal grandfather, maternal grandmother, paternal grandfather, and paternal grandmother; High Blood Pressure in his paternal grandfather; High Cholesterol in his father; Stroke in his maternal grandfather.     Allergies   Allergen Reactions    Amitriptyline Other (See Comments)     sedation  Other reaction(s): sedation  sedation    Codeine      States have used this with no issues  Other reaction(s): \"crazy\"  States have used this with no issues    Gabapentin      Neuro toxicity myoclonus     Suprax [Cefixime] Other (See Comments)     Pain         Objective:   BP (!) 120/59   Pulse 61   Temp 97.5 °F (36.4 °C) (Oral)   Resp 14   Ht 5' 7\" (1.702 m)   Wt 162 lb 0.6 oz (73.5 kg)   SpO2 99%   BMI 25.38 kg/m²       Intake/Output Summary (Last 24 hours) at 1/14/2022 0932  Last data filed at 1/14/2022 6328  Gross per 24 hour   Intake 710 ml   Output 860 ml   Net -150 ml           Physical Exam:  Constitutional:  Well developed, Well nourished, No acute distress, Non-toxic appearance. HENT:  Normocephalic, Atraumatic, Bilateral external ears normal, Oropharynx moist, No oral exudates, Nose normal. Neck- Normal range of motion, No tenderness, Supple, No stridor. Eyes:  PERRL, EOMI, Conjunctiva normal, No discharge. Respiratory:  Normal breath sounds, No respiratory distress, No wheezing, No chest tenderness. ,no use of accessory muscles, diaphragm movement is normal  Cardiovascular: (PMI) apex non displaced,no lifts no thrills, no s3,no s4, Normal heart rate, Normal rhythm, No murmurs, No rubs, No gallops. Carotid arteries pulse and amplitude are normal no bruit, no abdominal bruit noted ( normal abdominal aorta ausculation), femoral arteries pulse and amplitude are normal no bruit, pedal pulses are normal  GI:  Bowel sounds normal, Soft, No tenderness, No masses, No pulsatile masses. : External genitalia appear normal, No masses or lesions. No discharge. No CVA tenderness. Musculoskeletal:  Intact distal pulses, No edema, No tenderness, No cyanosis, No clubbing. Good range of motion in all major joints. No tenderness to palpation or major deformities noted. Back- No tenderness. Integument:  Warm, Dry, No erythema, No rash. Lymphatic:  No lymphadenopathy noted. Neurologic:  Alert & oriented x 3, Normal motor function, Normal sensory function, No focal deficits noted.    Psychiatric:  Affect normal, Judgment normal, Mood normal.     Medications:    ARIPiprazole  5 mg Oral Daily    aspirin  81 mg Oral Daily    atorvastatin  80 mg Oral Nightly    busPIRone  5 mg Oral BID    donepezil  10 mg Oral Nightly    fluticasone  2 spray Each Nostril Daily    insulin glargine  25 Units SubCUTAneous Nightly    levothyroxine  100 mcg Oral Daily    memantine  5 mg Oral BID    midodrine  5 mg Oral BID    morphine  15 mg Oral Daily    pantoprazole  40 mg Oral Daily    QUEtiapine  75 mg Oral BID    sertraline  50 mg Oral Daily    traZODone  75 mg Oral Nightly    vitamin D  2,000 Units Oral Daily    sodium chloride flush  5-40 mL IntraVENous 2 times per day    insulin lispro  0-12 Units SubCUTAneous Q4H    dexamethasone  6 mg IntraVENous Q24H    levoFLOXacin  500 mg Oral Every Other Day      heparin (PORCINE) Infusion Stopped (01/13/22 2152)    sodium chloride      dextrose       heparin (porcine), heparin (porcine), albuterol sulfate HFA, hydrOXYzine, melatonin, sodium chloride flush, sodium chloride, polyethylene glycol, acetaminophen **OR** acetaminophen, glucose, dextrose, glucagon (rDNA), dextrose, prochlorperazine    Lab Data:  CBC:   Recent Labs     01/13/22 0250 01/14/22 0218   WBC 4.0 5.8   HGB 8.1* 8.0*   HCT 26.6* 25.4*   MCV 91.7 89.4   * 147     BMP:   Recent Labs     01/13/22 0250 01/13/22 1935 01/14/22 0218    136 140   K 5.0 3.8 4.4    99 103   CO2 16* 23 21   PHOS  --   --  4.3   BUN 80* 50* 62*   CREATININE 4.7* 2.4* 3.4*     LIVER PROFILE:   Recent Labs     01/13/22 0250 01/14/22 0218   AST 72* 60*   ALT 29 28   BILIDIR  --  0.2   BILITOT 0.2 0.3   ALKPHOS 86 79     PT/INR:   Recent Labs     01/13/22 0250   PROTIME 14.6*   INR 1.13     APTT:   Recent Labs     01/13/22 1935 01/14/22 0218 01/14/22  0555   APTT >240.0* >240.0* >240.0*     BNP:  No results for input(s): BNP in the last 72 hours. TROPONIN: @TROPONINI:3@      Assessment:  80 y. o.year old who is admitted for          Plan:  1. COVID 19\" recommend treatment as per protocol  2. Rt Lower lobe segmental PE: no need for EKOS, echho ordered to check for RV strain, recommend anticoagulation, patient has anemia also, start iIV hep  3. Dm: stable  4. Renal failure, acute, recommend IVF  5. Elevated trop\" not an ACS    All labs, medications and tests reviewed, continue all other medications of all above medical condition listed as is.       Norma Mays MD, MD 1/14/2022 9:32 AM

## 2022-01-14 NOTE — PROGRESS NOTES
Called report to [facility] on [address]. Patient then transported by nurse aide and additional nurse. Patient awake, alert, oriented x 3 with some confusion. No s/s of distress noted.   Vs are stable

## 2022-01-14 NOTE — PROGRESS NOTES
[facility] HOSPITALIST PROGRESS NOTE      PCP: Shane Sandifer, MD    Date of Admission: 1/13/2022    Subjective: feels better     Brief Hospital summary patient is a 81-year-old male with history of hypertension, hyperlipidemia, CVA, diabetes mellitus, dementia, hyperlipidemia and end-stage renal disease was admitted with hypoxia and unresponsiveness. Patient was saturating 70s in the ER, had pinpoint pupils improved after Narcan given, CT showed bilateral PE, positive for COVID, heparin infusion started  Patient was admitted to ICU, cardiology and nephrology consulted    Vitals signs:  Afebrile, on 2 L of oxygen, heart rate 60s range, blood pressure 120/59    Medications: Abilify, aspirin, Lipitor, buspirone, dexamethasone, donepezil, Lantus, sliding scale insulin, levofloxacin, pantoprazole, Seroquel, sertraline, trazodone    Antibiotics: *Levofloxacin x1    Fluid status: Not documented    Labs: Sodium 140, potassium 4.4, chloride 103, bicarb 21, creatinine 3.4  LFTs normal  Procalcitonin 1.4  WBC 5.8, hemoglobin 8, platelets 981    Imaging:   CT chest without contrast     1. Suspected small right middle lobe segmental and subsegmental pulmonary   embolus.  Motion significantly degrades the quality of the exam.   2. Small foci of peripheral consolidation in the upper lobes.  Consider mild   COVID pneumonia. 3. Mild bibasilar atelectasis or pneumonia. 4. Splenomegaly.    5. Distended gallbladder.  Mild intra and extrahepatic biliary ductal   dilatation  Assessment/Plan:     Acute metabolic encephalopathy  Severe sepsis secondary to COVID-19 pneumonia with acute hypoxic respiratory failure  Pulmonary embolism bilateral  History of dementia  History of mood disorder  Chronic anemia  Mild elevated troponin  Continuous opioid dependence  End-stage renal disease  Diabetes mellitus type 2  Chronic hypertension  Hyperlipidemia  Hypothyroidism        Admitted secondary to syncope most likely secondary to PE, responded to Narcan  Hold MS Contin  Heparin infusion started, blood pressure low will hold antihypertensives, continue Namenda  Consider pressors if remains low  Dexamethasone started, add famotidine  If oxygen worsens will consult pharmacy to assess for baricitinib  Nephrology consulted    DVT prophlaxis:   apixaban       Physical Exam Performed:       BP (!) 120/59   Pulse 61   Temp 97.5 °F (36.4 °C) (Oral)   Resp 14   Ht 5' 7\" (1.702 m)   Wt 162 lb 0.6 oz (73.5 kg)   SpO2 98%   BMI 25.38 kg/m²     Physical Exam  Constitutional:       General: He is not in acute distress. Appearance: Normal appearance. HENT:      Head: Normocephalic and atraumatic. Right Ear: External ear normal.      Left Ear: External ear normal.   Eyes:      Extraocular Movements: Extraocular movements intact. Pupils: Pupils are equal, round, and reactive to light. Cardiovascular:      Rate and Rhythm: Normal rate and regular rhythm. Heart sounds: No murmur heard. Pulmonary:      Effort: Pulmonary effort is normal. No respiratory distress. Breath sounds: Normal breath sounds. No wheezing. Abdominal:      General: Bowel sounds are normal. There is no distension. Palpations: Abdomen is soft. Tenderness: There is no abdominal tenderness. Musculoskeletal:         General: No swelling. Cervical back: Normal range of motion. Skin:     General: Skin is warm. Neurological:      General: No focal deficit present. Mental Status: He is alert and oriented to person, place, and time. Cranial Nerves: No cranial nerve deficit.    Psychiatric:         Mood and Affect: Mood normal.         Labs:   Recent Labs     01/13/22  0250 01/14/22 0218   WBC 4.0 5.8   HGB 8.1* 8.0*   HCT 26.6* 25.4*   * 147     Recent Labs     01/13/22  0250 01/13/22  1935 01/14/22 0218    136 140   K 5.0 3.8 4.4    99 103   CO2 16* 23 21   BUN 80* 50* 62*   CREATININE 4.7* 2.4* 3.4*   CALCIUM 7. 8* 8.1* 8.0*   PHOS  --   --  4.3     Recent Labs     01/13/22  0250 01/14/22  0218   AST 72* 60*   ALT 29 28   BILIDIR  --  0.2   BILITOT 0.2 0.3   ALKPHOS 86 79     Recent Labs     01/13/22  0250   INR 1.13     No results for input(s): CKTOTAL, TROPONINI in the last 72 hours. Urinalysis:      Lab Results   Component Value Date    NITRU NEGATIVE 05/01/2020    WBCUA <1 05/01/2020    BACTERIA NEGATIVE 05/01/2020    RBCUA 1 05/01/2020    BLOODU SMALL 05/01/2020    SPECGRAV 1.015 05/01/2020       Radiology:  XR CHEST PORTABLE   Final Result   Mild right basilar opacity. Considerations include atelectasis and mild   pneumonia. Mild cardiac silhouette enlargement. Mild central vascular congestion. CTA CHEST W CONTRAST   Final Result   1. Suspected small right middle lobe segmental and subsegmental pulmonary   embolus. Motion significantly degrades the quality of the exam.   2. Small foci of peripheral consolidation in the upper lobes. Consider mild   COVID pneumonia. 3. Mild bibasilar atelectasis or pneumonia. 4. Splenomegaly. 5. Distended gallbladder. Mild intra and extrahepatic biliary ductal   dilatation. This report was discussed with Dr. Mali Culver at 3:53 a.m. on 01/13/2022. [facility] CTA HEAD NECK W CONTRAST   Final Result   1. No large vessel occlusion in the head or neck. 2.  Mild-to-moderate atherosclerotic disease. 3.  Scattered heterogeneous opacities in the visualized lung apices may   relate to an atypical infectious/inflammatory process including atypical or   viral/COVID-19 pneumonia versus mild pulmonary edema. 4.  C4-C5 osseous sclerosis and endplate erosions may relate to advanced   degenerative changes. Underlying discitis/osteomyelitis is difficult to   entirely exclude and could be further evaluated with follow-up cervical spine   MRI.          CT HEAD WO CONTRAST   Final Result   No acute intracranial hemorrhage, mass effect, midline shift, or hydrocephalus. Streak artifact over the frontal lobes but no sign of an   acute territorial infarct. Volume loss with prominence of the ventricles and sulci are stable. Critical results were called by Dr. Jacquelyn Deleon MD to Dr Gallito Pacheco   on 1/13/2022 at 02:42.                  Tera Nolan MD  1/14/2022 10:11 AM

## 2022-01-14 NOTE — PROGRESS NOTES
Patient tolerated 2.5 hour hemodialysis treatment poorly today. During treatment, pt's BP dropped and MD Patty Sosa was notified, pt was given albumin and txt time cut down to 2 hours. Pt was asleep for most of the treatment and did not complain about pain or discomfort from HD. 360ml of fluid was removed, UF goal of 1L not meet. AVF site bleed for more than 10 min from arterial needle site. Pressure applied and gauze and paper tape applied to site after blood rinsed back. HD was overseen by alesha LOPEZ    Patient Name: Keisha Chadwick  Patient : 1940  MRN: [id number]     Acct: [de-identified]  Date of Admission: 2022  Room/Bed: -A  Code Status:  Full Code  Allergies:    Allergies   Allergen Reactions    Amitriptyline Other (See Comments)     sedation  Other reaction(s): sedation  sedation    Codeine      States have used this with no issues  Other reaction(s): \"crazy\"  States have used this with no issues    Gabapentin      Neuro toxicity myoclonus     Suprax [Cefixime] Other (See Comments)     Pain     Diagnosis:    Patient Active Problem List   Diagnosis    Altered mental status    Essential hypertension    Low back pain    Acquired hypothyroidism    Panic disorder    Dystonia    Chronic kidney disease (CKD) stage G3a/A3, moderately decreased glomerular filtration rate (GFR) between 45-59 mL/min/1.73 square meter and albuminuria creatinine ratio greater than 300 mg/g (HCC)    Type 2 diabetes mellitus without complication (HCC)    Ambulatory dysfunction    Non compliance w medication regimen    Hyperlipidemia    Closed fracture of distal end of fibula    Acute metabolic encephalopathy    SO (acute kidney injury) [facility]    Encephalopathy acute    Closed displaced fracture of shaft of second metacarpal bone of right hand    Moderate episode of recurrent major depressive disorder (HCC)    Generalized anxiety disorder    ESRD (end stage renal disease) ([address]    ESRD on hemodialysis ([facility]    AVF [facility]iovenous fistula) ([facility]    Hyperkalemia    Chronic kidney disease, stage IV (severe) ([facility])    Acquired renal cyst    Allergic rhinitis    Anemia due to stage 4 chronic kidney disease ([facility])    Balance disorder    Elevated homocysteine    Gastroesophageal reflux disease    Impaired mobility    Insomnia due to other mental disorder    Iron deficiency    Memory impairment    Microalbuminuria due to type 2 diabetes mellitus ([facility]    Mononeuritis    Right inguinal hernia    Schizoaffective disorder ([facility]    Seasonal allergies    Severe episode of recurrent major depressive disorder, without psychotic features ([facility]    Spasmodic torticollis    Spinal stenosis of cervical region    Stenosis of both internal carotid arteries    Visual disturbance    Vitamin B12 deficiency    Vitamin D deficiency    Pulmonary embolism and infarction ([facility]         Treatment:  Hemodialysis 1:1  Priority: Routine  Location: Bedside    Diabetic: Yes  NPO: No  Isolation Precautions: Airborne     Consent for Treatment Verified: Yes  Blood Consent Verified: Not Applicable     Safety Verified: Identify (I), Consent (C), Equipment (E), HepB Status (B), Orders Complete (O), Access Verified (A) and Timeliness (T)  Time out performed prior to access at 1815 hours. Report Received from Primary RN at 0900 hours. Primary RN (First Initial, Last Name, Title):  Clint LOPEZ  Incapacitated Nurse Education Completed: Not Applicable     HBsAg ONLY:  Date Drawn: January 13, 2022       Results: Positive  HBsAb:  Date Drawn:  January 13, 2022       Results: Immune >10    Order  Dialysis Bath  K+ (Potassium): 3  Ca+ (Calcium): 2.5  Na+ (Sodium): 138  HCO3 (Bicarb): 35     Na+ Modeling: Not Applicable  Dialyzer: [id number]  Dialysate Temperature (C):  35  Blood Flow Rate (BFR):  250   Dialysate Flow Rate (DFR):   600        Access to be Utilized   Access: AVF  Location: Upper Extremity  Side: Left   Needle gauge: 16  + Bruit/Thrill: Yes    First Use X-ray Verified: Yes  OK to use line order: Yes    Site Assessment:  Signs and Symptoms of Infection/Inflammation: None  If yes: Not Applicable  Dressing: n/a  Site Prep: Medical Aseptic Technique  Dressing Changed this Treatment: n/a  If yes, by whom: n/a  Date of Last Dressing Change: Not Applicable  Antimicrobial Patch in place?: n/a  Red Alcohol Caps in place?: n/a  Gauze Dressing?: No  Non Dialysis Use?: No  Comment:    Flows: Good, Patent  If access problem, who was notified:     Pre and Post-Assessment  Patient Vitals for the past 8 hrs:   Level of Consciousness Oriented X Heart Rhythm Respiratory Quality/Effort O2 Device Bilateral Breath Sounds Skin Color Skin Condition/Temp Abdomen Inspection Bowel Sounds (All Quadrants) Edema Comments   01/13/22 1815 Responds to Voice (1) 1 Regular Unlabored Nasal cannula Diminished Pale Dry; Cool Soft Active None Consent verified, pre-tx checks completed   01/13/22 2035 Responds to Voice (1) 1 Regular Unlabored Nasal cannula Diminished Pale Dry; Cool Soft Active None TX completed     Labs  Recent Labs     01/13/22  0250   WBC 4.0   HGB 8.1*   HCT 26.6*   *                                                                  Recent Labs     01/13/22  0250      K 5.0      CO2 16*   BUN 80*   CREATININE 4.7*   GLUCOSE 188*     IV Drips and Rate/Dose   heparin (PORCINE) Infusion 18 Units/kg/hr (01/13/22 0840)    sodium chloride      dextrose        Safety - Before each treatment:   Dialysis Machine No.: [id number]  RO Machine No.: [id number]  Dialyzer Lot No.: [id number]  RO Machine Log Sheet Completed: Yes  Machine Alarm Self Test: Completed; Passed (01/13/22 1815)  Machine Autotest: Completed,Passed  Air Foam Detector: [employer] Function  Extracorporeal Circuit Tested for Integrity: Yes  Machine Conductivity: 14  Manual Conductivity: 13.8     Bicarbonate Concentrate Lot No.: [id number]  Acid Concentrate Lot No.: [id number]  Manual Ph: 7.4  Bleach Test (Neg): Yes  Bath Temperature: 95 °F (35 °C)  Tubing Lot#: [id number]  Conductivity Meter Serial #: [id number]  All Connections Secure?: Yes  Venous Parameters Set?: Yes  Arterial Parameters Set?: Yes  Saline Line Double Clamped?: Yes  Air Foam Detector Engaged?: Yes  Machine Functioning Alarm Free?  Yes  Prime Given: 200ml    Chlorine Testing - Before each treatment and every 4 hours:   Treatment  Time On: 1830  Time Off: 2033  Treatment Goal: 1  Weight: 162 lb 0.6 oz (73.5 kg) (01/13/22 2033)  1st check: less than 0.1 ppm at: 1724 hours  2nd check: less than 0.1 ppm at: n/a  3rd check: Not Applicable  (if greater than 0.1 ppm, then check every 30 minutes from secondary)    Access Flows and Pressures  Patient Vitals for the past 8 hrs:   Blood Flow Rate (ml/min) Ultrafiltration Rate (ml/hr) Ultrafiltration Total Arterial Pressure (mmHg) Venous Pressure (mmHg) TMP Hemodialysis Conductivity DFR Comments Access Visible   01/13/22 1830 250 ml/min 600 ml/hr 0 ml -50 mmHg 150 70 13.9 600 txt started Yes   01/13/22 1845 250 ml/min 600 ml/hr 250 ml -50 mmHg 160 70 -- 600 RN at bedside Yes   01/13/22 1900 250 ml/min 600 ml/hr 261 ml -50 mmHg 150 60 -- 600 taking medication Yes   01/13/22 1915 300 ml/min 600 ml/hr 422 ml -80 mmHg 170 60 -- 600 pt tolerating hd Yes   01/13/22 1930 250 ml/min 610 ml/hr 563 ml -60 mmHg 130 70 -- 600 pt resting queitly Yes   01/13/22 1945 250 ml/min 610 ml/hr 711 ml -70 mmHg 130 70 -- 600 resting with eyes closed Yes   01/13/22 2000 225 ml/min 0 ml/hr 860 ml -70 mmHg 100 60 -- 600 bp dropped, MD notified, UF turned off and blood low reduced Yes   01/13/22 2015 250 ml/min 0 ml/hr 860 ml -70 mmHg 130 50 -- 600 pt still resting with eyes cloes Yes   01/13/22 2030 250 ml/min 0 ml/hr 860 ml -50 mmHg 140 50 -- 600 RN admin albumin  Yes   01/13/22 2033 250 ml/min -- -- -- -- -- -- -- txt ended Yes     Vital Signs  Patient Vitals for the past 8 hrs:   BP Temp Pulse Resp SpO2 Weight Weight Method Percent Weight Change   01/13/22 1830 (!) 116/58 95.9 °F (35.5 °C) 81 20 98 % 102 lb 8.2 oz (46.5 kg) Bed scale -38.98   01/13/22 1845 126/68 -- 70 11 100 % -- -- --   01/13/22 1900 137/75 -- 74 14 92 % -- -- --   01/13/22 1915 (!) 121/59 -- 68 17 99 % -- -- --   01/13/22 1930 105/61 -- 63 11 98 % -- -- --   01/13/22 1945 (!) 92/54 -- 63 11 99 % -- -- --   01/13/22 2000 (!) 78/49 -- 60 11 95 % -- -- --   01/13/22 2015 (!) 104/56 -- 60 14 99 % -- -- --   01/13/22 2030 109/62 -- 64 15 99 % -- -- --   01/13/22 2033 109/62 96.8 °F (36 °C) 65 15 98 % 162 lb 0.6 oz (73.5 kg) Bed scale 58.06     Post-Dialysis  Arterial Catheter Locking Solution: Not Applicable  Venous Catheter Locking Solution: Not Applicable  Post-Treatment Procedures: Blood returned,Access bleeding time > 10 minutes  Machine Disinfection Process: Acid/Vinegar Clean,Bleach,Exterior Machine Disinfection  Rinseback Volume (ml): 300 ml  Total Liters Processed (l/min): 30.4 l/min  Dialyzer Clearance: Moderately streaked  Duration of Treatment (minutes): 120 minutes     Hemodialysis Intake (ml): 500 ml  Hemodialysis Output (ml): 860 ml  NET Removed (ml): 360 ml  Tolerated Treatment: Poor  Patient Response to Treatment: tolerated poorly  Physician Notified?: Yes       Provider Notification        Handoff complete and report given to Primary RN at 2033 hours. Primary RN (First Initial, Last Name, Title):  TORIE Villanueva RN     Education  Person Educated: Patient   Knowledge Base: Minimal  Barriers to Learning?: None  Preferred method of Learning: Oral  Topic(s): Access Care, Signs and Symptoms of Infection and Fluid Management   Teaching Tools: Explanation   Response to Education: Verbalized Understanding     Electronically signed by Bradley Mak RN on 1/13/2022 at 9:53 PM

## 2022-01-14 NOTE — PROGRESS NOTES
[id number] [facility] OF SPEECH/LANGUAGE PATHOLOGY  DAILY PROGRESS NOTE  Rickey Mishra  1/14/2022  [id number]  Pulmonary embolism and infarction ([address] [I26.99]  Elevated troponin [R77.8]  Acute respiratory failure, unspecified whether with hypoxia or hypercapnia (HCC) [J96.00]  Altered mental status, unspecified altered mental status type [R41.82]  Single subsegmental pulmonary embolism without acute cor pulmonale (HCC) [I26.93]  Pneumonia due to COVID-19 virus [U07.1, J12.82]  Allergies   Allergen Reactions    Amitriptyline Other (See Comments)     sedation  Other reaction(s): sedation  sedation    Codeine      States have used this with no issues  Other reaction(s): \"crazy\"  States have used this with no issues    Gabapentin      Neuro toxicity myoclonus     Suprax [Cefixime] Other (See Comments)     Pain         Pt was seen this date for dysphagia treatment. IMPRESSION AND RECOMMENDATIONS: Rickey Mishra was seen for dysphagia follow-up. He was seated upright in bed, alert, cooperative. Education provided re: results and concerns from yesterday's evaluation. Discussed bolus control strategies to improve safety with liquids. Pt presented with thin liquids via tsp, cup, and straw and instructed to perform strategies with these trials. He self fed, and was noted to hold the cup with two hands. He required intermittent cues throughout for implementation of strategies, increased with trials of liquids via straw. Immediate coughing noted x1 following trial of liquids via straw only. No s/s aspiration identified with remaining trials. Pt verbalizes agreement with recommendations/strategies and voiced \"that does help. \"     Recommend advancement to thin liquids via cup only (no straws), continued minced/moist diet. Continue to assist with setup of meals and ensure HOB is elevated so that pt is seated upright for all intake. SLP will follow.       GOALS (current status in bold):  Short-term Goals  Timeframe for Short-term Goals: length of admission  Goal 1: Pt will tolerate minced/moist diet and nectar thick liquids with adequate mastication/clearance and no s/s aspiration. DNT  Goal 2: Pt will tolerate PO trials of advanced textures with adequate oral manipulation/clearance and no s/s aspiration for safe diet upgrade. Partially met, advance to thin liquids via cup (no straws)  Goal 3: Pt/caregivers will indicate understanding of all recommendations.  D/w pt, will benefit from reinforcement    EDUCATION: recommendations/plan, strategies as above    PAIN RATING (0-10 Scale): 0/denies  Time in/Time out: SLP Individual Minutes  Time In: 0945  Time Out: 1000  Minutes: 15    Visit number: 200 [address] Rom[facility] MA [facility]  1/14/2022  12:03 PM

## 2022-01-14 NOTE — PROGRESS NOTES
Nephrology Progress Note  1/14/2022 6:18 PM  Subjective: Interval History: Jayleen Lynn is a 80 y.o. male with weakness but more awake today and treating in the setting of COVID        Data:   Scheduled Meds:   vancomycin (VANCOCIN) intermittent dosing (placeholder)   Other See Admin Instructions    insulin lispro  0-12 Units SubCUTAneous TID WC    insulin lispro  0-6 Units SubCUTAneous Nightly    apixaban  10 mg Oral BID    ARIPiprazole  5 mg Oral Daily    aspirin  81 mg Oral Daily    atorvastatin  80 mg Oral Nightly    busPIRone  5 mg Oral BID    donepezil  10 mg Oral Nightly    fluticasone  2 spray Each Nostril Daily    insulin glargine  25 Units SubCUTAneous Nightly    levothyroxine  100 mcg Oral Daily    memantine  5 mg Oral BID    midodrine  5 mg Oral BID    morphine  15 mg Oral Daily    pantoprazole  40 mg Oral Daily    QUEtiapine  75 mg Oral BID    sertraline  50 mg Oral Daily    traZODone  75 mg Oral Nightly    vitamin D  2,000 Units Oral Daily    sodium chloride flush  5-40 mL IntraVENous 2 times per day    dexamethasone  6 mg IntraVENous Q24H    levoFLOXacin  500 mg Oral Every Other Day     Continuous Infusions:   sodium chloride      dextrose           CBC   Recent Labs     01/13/22 0250 01/14/22 0218   WBC 4.0 5.8   HGB 8.1* 8.0*   HCT 26.6* 25.4*   * 147      BMP   Recent Labs     01/13/22 0250 01/13/22  1935 01/14/22 0218    136 140   K 5.0 3.8 4.4    99 103   CO2 16* 23 21   PHOS  --   --  4.3   BUN 80* 50* 62*   CREATININE 4.7* 2.4* 3.4*     Hepatic:   Recent Labs     01/13/22 0250 01/14/22 0218   AST 72* 60*   ALT 29 28   BILITOT 0.2 0.3   ALKPHOS 86 79     Troponin: No results for input(s): TROPONINI in the last 72 hours. BNP: No results for input(s): BNP in the last 72 hours. Lipids: No results for input(s): CHOL, HDL in the last 72 hours.     Invalid input(s): LDLCALCU  ABGs:   Lab Results   Component Value Date    PO2ART 97 01/13/2022 [id number] 39.0 01/13/2022     INR:   Recent Labs     01/13/22  0250   INR 1.13     Renal Labs  Albumin:    Lab Results   Component Value Date    LABALBU 3.8 01/14/2022    LABALBU 3.8 01/14/2022    LABALBU 3.8 05/19/2021     Calcium:    Lab Results   Component Value Date    CALCIUM 8.0 01/14/2022     Phosphorus:    Lab Results   Component Value Date    PHOS 4.3 01/14/2022     U/A:    Lab Results   Component Value Date    NITRU NEGATIVE 05/01/2020    COLORU ELIDA 05/01/2020    WBCUA <1 05/01/2020    RBCUA 1 05/01/2020    MUCUS RARE 06/01/2018    TRICHOMONAS NONE SEEN 05/01/2020    BACTERIA NEGATIVE 05/01/2020    CLARITYU CLEAR 05/01/2020    SPECGRAV 1.015 05/01/2020    UROBILINOGEN NORMAL 05/01/2020    BILIRUBINUR NEGATIVE 05/01/2020    BLOODU SMALL 05/01/2020    KETUA NEGATIVE 05/01/2020     ABG:    Lab Results   Component Value Date    MJW8VGM 39.0 01/13/2022    PO2ART 97 01/13/2022    IRV7ANL 17.1 01/13/2022     HgBA1c:    Lab Results   Component Value Date    LABA1C 7.4 05/02/2020     Microalbumen/Creatinine ratio:  No components found for: RUCREAT  TSH:  No results found for: TSH  IRON:    Lab Results   Component Value Date    IRON 42 05/04/2020     Iron Saturation:  No components found for: PERCENTFE  TIBC:    Lab Results   Component Value Date    TIBC 168 05/04/2020     FERRITIN:    Lab Results   Component Value Date    FERRITIN 1,627 01/13/2022     RPR:  No results found for: RPR  CE:  No results found for: ANATITER, CE  24 Hour Urine for Creatinine Clearance:  No components found for: CREAT4, UHRS10, UTV10      Objective:   I/O: 01/13 0701 - 01/14 0700  In: 700 [P.O.:200]  Out: 860   I/O last 3 completed shifts:   In: 700 [P.O.:200]  Out: 860   I/O this shift:  In: 10 [I.V.:10]  Out: -   Vitals: BP (!) 99/50   Pulse 54   Temp 97.7 °F (36.5 °C) (Oral)   Resp 15   Ht 5' 7\" (1.702 m)   Wt 162 lb 0.6 oz (73.5 kg)   SpO2 99%   BMI 25.38 kg/m²  {  General appearance: awake weak  HEENT: Head: Normal, normocephalic, atraumatic.   Neck: supple, symmetrical, trachea midline  Lungs: diminished breath sounds bilaterally  Heart: S1, S2 normal  Abdomen: abnormal findings:  soft nt  Extremities: edema trace positive AV fistula  Neurologic: Mental status: alertness: alert anxious        Assessment and Plan:      IMP:  #1 acute renal failure versus CKD 5 versus end-stage renal disease  #2 COVID-19 pneumonia  #3 positive pulm embolism  #4 hypotension  #5 hepatitis B surface antigen positive  #6 metabolic encephalopathy  #7 metabolic acidosis    Plan     #1 tomorrow and see if renal function will recover we will plan on dialysis possibly tomorrow  #2 maintain therapy in the setting of COVID  #3 maintain anticoagulation possibly changed to Eliquis  #4 blood pressure low stable monitor  #5 unsure how hepatitis B is positive at this time but will treat in isolation  #6 monitor affect improved  #7 correct acidosis with dialysis  #8 patient is a DNR CCA at this time I have discussed with patient as well as daughter about long-term plans with dialysis patient and reviewed like to do  We will follow           Valery Kay MD, MD

## 2022-01-14 NOTE — PROGRESS NOTES
[id number] [facility]  consulted by Dr. Haven Villagomez for monitoring and adjustment. Indication for treatment: Bacteremia  Goal trough: 15-20 mcg/mL  AUC/DERECK:  400-600    Pertinent Laboratory Values:   Temp Readings from Last 3 Encounters:   01/14/22 97.5 °F (36.4 °C) (Oral)   09/08/21 97 °F (36.1 °C)   06/09/21 97.9 °F (36.6 °C) (Infrared)     Recent Labs     01/13/22  0250 01/14/22  0218   WBC 4.0 5.8   LACTATE 0.6  --      Recent Labs     01/13/22  0250 01/13/22  1935 01/14/22 0218   BUN 80* 50* 62*   CREATININE 4.7* 2.4* 3.4*     Estimated Creatinine Clearance: 16 mL/min (A) (based on SCr of 3.4 mg/dL (H)). Intake/Output Summary (Last 24 hours) at 1/14/2022 1058  Last data filed at 1/14/2022 0838  Gross per 24 hour   Intake 710 ml   Output 860 ml   Net -150 ml       Pertinent Cultures:  Date    Source    Results  1/13   Blood    Staph sp. 1/2    Vancomycin level:   TROUGH:  No results for input(s): VANCOTROUGH in the last 72 hours. RANDOM:  No results for input(s): VANCORANDOM in the last 72 hours. Assessment:  · WBC and temperature: WBC WNL/afebrile  · SCr, BUN, and urine output:   · CKD  · HD initiated per nephrology, patient is unsure if wants to maintain on HD longterm  · Day(s) of therapy: 1  · Vancomycin concentration: to be collected    Plan:  · Vancomycin 1500 mg x1 dose  · Intermittent vancomycin dosing based on CKD, now requiring HD  · Received HD yesterday, next HD not yet scheduled  · Plan to collect a level tomorrow AM and re-dose if needed  · Pharmacy will continue to monitor patient and adjust therapy as indicated    Robert 3 1/15 @ 0600    Thank you for the consult.   Brennan Rowell [facility], PharmD  1/14/2022 10:58 AM

## 2022-01-15 LAB
ALBUMIN SERPL-MCNC: 3.5 GM/DL (ref 3.4–5)
ANION GAP SERPL CALCULATED.3IONS-SCNC: 13 MMOL/L (ref 4–16)
BUN BLDV-MCNC: 86 MG/DL (ref 6–23)
CALCIUM SERPL-MCNC: 7.7 MG/DL (ref 8.3–10.6)
CHLORIDE BLD-SCNC: 101 MMOL/L (ref 99–110)
CO2: 24 MMOL/L (ref 21–32)
CREAT SERPL-MCNC: 5.5 MG/DL (ref 0.9–1.3)
D DIMER: 563 NG/ML(DDU)
DOSE AMOUNT: NORMAL
DOSE TIME: NORMAL
GFR AFRICAN AMERICAN: 12 ML/MIN/1.73M2
GFR NON-AFRICAN AMERICAN: 10 ML/MIN/1.73M2
GLUCOSE BLD-MCNC: 113 MG/DL (ref 70–99)
GLUCOSE BLD-MCNC: 120 MG/DL (ref 70–99)
GLUCOSE BLD-MCNC: 133 MG/DL (ref 70–99)
GLUCOSE BLD-MCNC: 151 MG/DL (ref 70–99)
GLUCOSE BLD-MCNC: 160 MG/DL (ref 70–99)
HIGH SENSITIVE C-REACTIVE PROTEIN: 96 MG/L
PHOSPHORUS: 4.5 MG/DL (ref 2.5–4.9)
POTASSIUM SERPL-SCNC: 4.8 MMOL/L (ref 3.5–5.1)
PROCALCITONIN: 2.05
SODIUM BLD-SCNC: 138 MMOL/L (ref 135–145)
VANCOMYCIN RANDOM: 15.5 UG/ML

## 2022-01-15 PROCEDURE — 99232 SBSQ HOSP IP/OBS MODERATE 35: CPT | Performed by: INTERNAL MEDICINE

## 2022-01-15 PROCEDURE — 36415 COLL VENOUS BLD VENIPUNCTURE: CPT

## 2022-01-15 PROCEDURE — 80069 RENAL FUNCTION PANEL: CPT

## 2022-01-15 PROCEDURE — 2580000003 HC RX 258: Performed by: HOSPITALIST

## 2022-01-15 PROCEDURE — 80202 ASSAY OF VANCOMYCIN: CPT

## 2022-01-15 PROCEDURE — 6360000002 HC RX W HCPCS: Performed by: STUDENT IN AN ORGANIZED HEALTH CARE EDUCATION/TRAINING PROGRAM

## 2022-01-15 PROCEDURE — 86141 C-REACTIVE PROTEIN HS: CPT

## 2022-01-15 PROCEDURE — 6360000002 HC RX W HCPCS: Performed by: HOSPITALIST

## 2022-01-15 PROCEDURE — 1200000000 HC SEMI PRIVATE

## 2022-01-15 PROCEDURE — APPSS45 APP SPLIT SHARED TIME 31-45 MINUTES: Performed by: NURSE PRACTITIONER

## 2022-01-15 PROCEDURE — 94761 N-INVAS EAR/PLS OXIMETRY MLT: CPT

## 2022-01-15 PROCEDURE — 6370000000 HC RX 637 (ALT 250 FOR IP): Performed by: HOSPITALIST

## 2022-01-15 PROCEDURE — 2580000003 HC RX 258: Performed by: STUDENT IN AN ORGANIZED HEALTH CARE EDUCATION/TRAINING PROGRAM

## 2022-01-15 PROCEDURE — 84145 PROCALCITONIN (PCT): CPT

## 2022-01-15 PROCEDURE — 82962 GLUCOSE BLOOD TEST: CPT

## 2022-01-15 PROCEDURE — 2700000000 HC OXYGEN THERAPY PER DAY

## 2022-01-15 PROCEDURE — 85379 FIBRIN DEGRADATION QUANT: CPT

## 2022-01-15 PROCEDURE — 6370000000 HC RX 637 (ALT 250 FOR IP): Performed by: STUDENT IN AN ORGANIZED HEALTH CARE EDUCATION/TRAINING PROGRAM

## 2022-01-15 RX ADMIN — QUETIAPINE FUMARATE 75 MG: 25 TABLET ORAL at 09:06

## 2022-01-15 RX ADMIN — SERTRALINE HYDROCHLORIDE 50 MG: 50 TABLET ORAL at 08:59

## 2022-01-15 RX ADMIN — TAMSULOSIN HYDROCHLORIDE 0.4 MG: 0.4 CAPSULE ORAL at 21:35

## 2022-01-15 RX ADMIN — ARIPIPRAZOLE 5 MG: 5 TABLET ORAL at 09:00

## 2022-01-15 RX ADMIN — Medication 2000 UNITS: at 09:00

## 2022-01-15 RX ADMIN — ATORVASTATIN CALCIUM 80 MG: 40 TABLET, FILM COATED ORAL at 21:35

## 2022-01-15 RX ADMIN — BUSPIRONE HYDROCHLORIDE 5 MG: 5 TABLET ORAL at 21:35

## 2022-01-15 RX ADMIN — SODIUM CHLORIDE, PRESERVATIVE FREE 10 ML: 5 INJECTION INTRAVENOUS at 21:36

## 2022-01-15 RX ADMIN — DEXAMETHASONE SODIUM PHOSPHATE 6 MG: 10 INJECTION INTRAMUSCULAR; INTRAVENOUS at 09:00

## 2022-01-15 RX ADMIN — MORPHINE SULFATE 15 MG: 15 TABLET, FILM COATED, EXTENDED RELEASE ORAL at 09:01

## 2022-01-15 RX ADMIN — MEMANTINE HYDROCHLORIDE 5 MG: 5 TABLET ORAL at 21:36

## 2022-01-15 RX ADMIN — MIDODRINE HYDROCHLORIDE 5 MG: 5 TABLET ORAL at 21:35

## 2022-01-15 RX ADMIN — MIDODRINE HYDROCHLORIDE 5 MG: 5 TABLET ORAL at 09:00

## 2022-01-15 RX ADMIN — LEVOTHYROXINE SODIUM 100 MCG: 0.1 TABLET ORAL at 05:57

## 2022-01-15 RX ADMIN — BUSPIRONE HYDROCHLORIDE 5 MG: 5 TABLET ORAL at 09:00

## 2022-01-15 RX ADMIN — INSULIN GLARGINE 25 UNITS: 100 INJECTION, SOLUTION SUBCUTANEOUS at 21:43

## 2022-01-15 RX ADMIN — LEVOFLOXACIN 500 MG: 500 TABLET, FILM COATED ORAL at 09:00

## 2022-01-15 RX ADMIN — VANCOMYCIN HYDROCHLORIDE 1000 MG: 1 INJECTION, POWDER, LYOPHILIZED, FOR SOLUTION INTRAVENOUS at 23:10

## 2022-01-15 RX ADMIN — SODIUM CHLORIDE, PRESERVATIVE FREE 10 ML: 5 INJECTION INTRAVENOUS at 09:00

## 2022-01-15 RX ADMIN — FLUTICASONE PROPIONATE 2 SPRAY: 50 SPRAY, METERED NASAL at 09:02

## 2022-01-15 RX ADMIN — APIXABAN 10 MG: 5 TABLET, FILM COATED ORAL at 21:35

## 2022-01-15 RX ADMIN — QUETIAPINE FUMARATE 75 MG: 25 TABLET ORAL at 21:35

## 2022-01-15 RX ADMIN — TRAZODONE HYDROCHLORIDE 75 MG: 50 TABLET ORAL at 21:36

## 2022-01-15 RX ADMIN — PANTOPRAZOLE SODIUM 40 MG: 40 TABLET, DELAYED RELEASE ORAL at 08:59

## 2022-01-15 RX ADMIN — MEMANTINE HYDROCHLORIDE 5 MG: 5 TABLET ORAL at 09:00

## 2022-01-15 RX ADMIN — APIXABAN 10 MG: 5 TABLET, FILM COATED ORAL at 08:59

## 2022-01-15 RX ADMIN — ASPIRIN 81 MG CHEWABLE TABLET 81 MG: 81 TABLET CHEWABLE at 08:59

## 2022-01-15 RX ADMIN — DONEPEZIL HYDROCHLORIDE 10 MG: 5 TABLET, FILM COATED ORAL at 21:35

## 2022-01-15 ASSESSMENT — PAIN SCALES - GENERAL: PAINLEVEL_OUTOF10: 5

## 2022-01-15 ASSESSMENT — PAIN DESCRIPTION - LOCATION: LOCATION: BACK;NECK

## 2022-01-15 ASSESSMENT — PAIN DESCRIPTION - PAIN TYPE: TYPE: CHRONIC PAIN

## 2022-01-15 NOTE — PROGRESS NOTES
[id number] [facility]  consulted by Dr. Crystal Ulrich for monitoring and adjustment. Indication for treatment: Bacteremia  Goal trough: 15-20 mcg/mL  AUC/DERECK:  400-600    Pertinent Laboratory Values:   Temp Readings from Last 3 Encounters:   01/15/22 97.7 °F (36.5 °C) (Axillary)   09/08/21 97 °F (36.1 °C)   06/09/21 97.9 °F (36.6 °C) (Infrared)     Recent Labs     01/13/22  0250 01/14/22  0218   WBC 4.0 5.8   LACTATE 0.6  --      Recent Labs     01/13/22  1935 01/14/22  0218 01/15/22  1445   BUN 50* 62* 86*   CREATININE 2.4* 3.4* 5.5*     Estimated Creatinine Clearance: 10 mL/min (A) (based on SCr of 5.5 mg/dL (H)). Intake/Output Summary (Last 24 hours) at 1/15/2022 1649  Last data filed at 1/15/2022 0033  Gross per 24 hour   Intake --   Output 100 ml   Net -100 ml       Pertinent Cultures:  Date    Source    Results  1/13   Blood    Staph sp. 1/2    Vancomycin level:   TROUGH:  No results for input(s): VANCOTROUGH in the last 72 hours. RANDOM:    Recent Labs     01/15/22  1445   VANCORANDOM 15.5       Assessment:  · SCr, BUN, and urine output:   · CKD5 vs ESRD  · HD received on 1/13, no future sessions planned at this time  · Day(s) of therapy: 2  · Vancomycin concentration:  · 1/15 - 15.5, 25 hour level post initial 1500mg dose, ok to re-dose    Plan:  · Intermittent vancomycin dosing based on CKD5, possible HD  · Received HD on 1/13, vanco 1500mg ivpb x1 dose on 1/14  · Give vancomycin 1000mg ivpb x1 dose today  · Recheck the vanco level in 2 days  · Pharmacy will continue to monitor patient and adjust therapy as indicated    VANCOMYCIN CONCENTRATION SCHEDULED FOR 1/17 @ 0600    Thank you for the consult. Haylee Zamorano, [facility]  1/15/2022 4:49 PM

## 2022-01-15 NOTE — PROGRESS NOTES
[facility] HOSPITALIST PROGRESS NOTE      PCP: Beena Jackson MD    Date of Admission: 1/13/2022    Subjective: feels better     Brief Hospital summary patient is a 68-year-old male with history of hypertension, hyperlipidemia, CVA, diabetes mellitus, dementia, hyperlipidemia and end-stage renal disease was admitted with hypoxia and unresponsiveness. Patient was saturating 70s in the ER, had pinpoint pupils improved after Narcan given, CT showed bilateral PE, positive for COVID, heparin infusion started  Patient was admitted to ICU, cardiology and nephrology consulted  Underwent dialysis, switched to apixaban  Oxygen requirement improved      Vitals signs: Afebrile, on 2 L of oxygen, heart rate 50s to 60s range, blood pressure 112/53    Medications: Abilify, aspirin, Lipitor, buspirone, dexamethasone, donepezil, Lantus, sliding scale insulin, levofloxacin, pantoprazole, Seroquel, sertraline, trazodone    Antibiotics: *Levofloxacin day 3  Vancomycin day 2    Fluid status: 100 cc    Labs: No labs today    Imaging:   CT chest without contrast     1. Suspected small right middle lobe segmental and subsegmental pulmonary   embolus.  Motion significantly degrades the quality of the exam.   2. Small foci of peripheral consolidation in the upper lobes.  Consider mild   COVID pneumonia. 3. Mild bibasilar atelectasis or pneumonia. 4. Splenomegaly.    5. Distended gallbladder.  Mild intra and extrahepatic biliary ductal   Dilatation    Assessment/Plan:     Acute metabolic encephalopathy  Severe sepsis secondary to COVID-19 pneumonia with acute hypoxic respiratory failure  Pulmonary embolism bilateral  History of dementia  History of mood disorder  Chronic anemia  Mild elevated troponin  Continuous opioid dependence  End-stage renal disease  Diabetes mellitus type 2  Chronic hypertension  Hyperlipidemia  Hypothyroidism        Admitted secondary to syncope most likely secondary to PE,   responded to Narcan  Mentation back to baseline  Creatinine worse, nephrology consulted, underwent dialysis  HD per nephrology, plan for tomorrow    CT chest showed bilateral PE, heparin transition to apixaban  Continue for now    As needed hydralazine, continue to hold antihypertensives    Sliding scale insulin, Lantus, diabetic diet, glucose range    Continue dexamethasone and famotidine,  Hemoglobin remained stable  As needed antitussives  Droplet and contact precaution  Dexamethasone started, add famotidine  Check procalcitonin in a.m. Continue donepezil, Abilify, sertraline, quetiapine    Continue aspirin statin  Continue levothyroxine    Resume MS Contin    Continue Flomax    DVT prophlaxis:   apixaban       Physical Exam Performed:       BP (!) 112/53   Pulse 67   Temp 98.7 °F (37.1 °C) (Oral)   Resp 15   Ht 5' 7\" (1.702 m)   Wt 159 lb 1.6 oz (72.2 kg)   SpO2 92%   BMI 24.92 kg/m²     Physical Exam  Constitutional:       General: He is not in acute distress. Appearance: Normal appearance. HENT:      Head: Normocephalic and atraumatic. Right Ear: External ear normal.      Left Ear: External ear normal.   Eyes:      Extraocular Movements: Extraocular movements intact. Pupils: Pupils are equal, round, and reactive to light. Cardiovascular:      Rate and Rhythm: Normal rate and regular rhythm. Heart sounds: No murmur heard. Pulmonary:      Effort: Pulmonary effort is normal. No respiratory distress. Breath sounds: Normal breath sounds. No wheezing. Abdominal:      General: Bowel sounds are normal. There is no distension. Palpations: Abdomen is soft. Tenderness: There is no abdominal tenderness. Musculoskeletal:         General: No swelling. Cervical back: Normal range of motion. Skin:     General: Skin is warm. Neurological:      General: No focal deficit present. Mental Status: He is alert and oriented to person, place, and time. Cranial Nerves:  No cranial nerve deficit. Psychiatric:         Mood and Affect: Mood normal.         Labs:   Recent Labs     01/13/22  0250 01/14/22 0218   WBC 4.0 5.8   HGB 8.1* 8.0*   HCT 26.6* 25.4*   * 147     Recent Labs     01/13/22  0250 01/13/22  1935 01/14/22 0218    136 140   K 5.0 3.8 4.4    99 103   CO2 16* 23 21   BUN 80* 50* 62*   CREATININE 4.7* 2.4* 3.4*   CALCIUM 7.8* 8.1* 8.0*   PHOS  --   --  4.3     Recent Labs     01/13/22 0250 01/14/22 0218   AST 72* 60*   ALT 29 28   BILIDIR  --  0.2   BILITOT 0.2 0.3   ALKPHOS 86 79     Recent Labs     01/13/22 0250   INR 1.13     No results for input(s): CKTOTAL, TROPONINI in the last 72 hours. Urinalysis:      Lab Results   Component Value Date    NITRU NEGATIVE 05/01/2020    WBCUA <1 05/01/2020    BACTERIA NEGATIVE 05/01/2020    RBCUA 1 05/01/2020    BLOODU SMALL 05/01/2020    SPECGRAV 1.015 05/01/2020       Radiology:  XR CHEST PORTABLE   Final Result   Mild right basilar opacity. Considerations include atelectasis and mild   pneumonia. Mild cardiac silhouette enlargement. Mild central vascular congestion. CTA CHEST W CONTRAST   Final Result   1. Suspected small right middle lobe segmental and subsegmental pulmonary   embolus. Motion significantly degrades the quality of the exam.   2. Small foci of peripheral consolidation in the upper lobes. Consider mild   COVID pneumonia. 3. Mild bibasilar atelectasis or pneumonia. 4. Splenomegaly. 5. Distended gallbladder. Mild intra and extrahepatic biliary ductal   dilatation. This report was discussed with Dr. Henrry Camejo at 3:53 a.m. on 01/13/2022. [facility] CTA HEAD NECK W CONTRAST   Final Result   1. No large vessel occlusion in the head or neck. 2.  Mild-to-moderate atherosclerotic disease.       3.  Scattered heterogeneous opacities in the visualized lung apices may   relate to an atypical infectious/inflammatory process including atypical or   viral/COVID-19 pneumonia versus mild pulmonary edema. 4.  C4-C5 osseous sclerosis and endplate erosions may relate to advanced   degenerative changes. Underlying discitis/osteomyelitis is difficult to   entirely exclude and could be further evaluated with follow-up cervical spine   MRI. CT HEAD WO CONTRAST   Final Result   No acute intracranial hemorrhage, mass effect, midline shift, or   hydrocephalus. Streak artifact over the frontal lobes but no sign of an   acute territorial infarct. Volume loss with prominence of the ventricles and sulci are stable. Critical results were called by Dr. Ck Hilliard MD to Dr Carlota Aden   on 1/13/2022 at 02:42.                  Jacob Nieto MD  1/15/2022 10:42 AM

## 2022-01-15 NOTE — PROGRESS NOTES
Cardiology Progress Note     Today's Plan sign off- will be available if needed    Admit Date:  1/13/2022    Consult reason/ Seen today for: PE/ COVID    Subjective and  Overnight Events:  States he is feeling ok - falls to sleep during exam      Telemetry SR  Assessment / Plan / Recommendation:     1. Pulmonary emboli-small right lower lobe segmental PE- echo completed-no right heart strain- hemodynamicly stable - on anticoagulation   2. Elevated troponin- ruled in for NSTEMI in the setting of CKD and hypoxia- peak troponin 0.372- EKG no acute changes- Echo with no regional wall motion abnormality- medical management - continue statin and asa  3. COVID 19 - CTA chest concerning for mild COVID pneumonia -per primary- on decadron   4. Hypotension- bp improved-on midodrine   5. CKD- nephrology following   6. Anemia - chronic -stable         History of Presenting Illness:    Chief complain on admission : 80 y. o.year old who is admitted for  Chief Complaint   Patient presents with    Altered Mental Status     last seen by nursing home staff at 830pm, 70% room air, 80% now on 15 L for EMS, narcan given and awoke a little more         Past medical history:    has a past medical history of Anemia, Carotid stenosis, CKD (chronic kidney disease), Depression, Diabetes ([address] Dystonia, Fracture, GERD (gastroesophageal reflux disease), Hyperlipidemia, Hypertension, Hypothyroidism, Metabolic encephalopathy, Neuropathy, Non-smoker, Poor historian, Schizoaffective disorder ([address] and Vitamin D deficiency. Past surgical history:   has a past surgical history that includes Ankle surgery (Left, 06/25/2016); fracture surgery; back surgery; Colonoscopy; Endoscopy, colon, diagnostic; eye surgery (09/2019); hernia repair (Right); Tonsillectomy; and Hand surgery (Right, 10/4/2019). Social History:   reports that he has never smoked.  He has never used smokeless tobacco. He reports that he does not drink alcohol and does not use drugs. Family history:  family history includes Depression in his daughter; Diabetes in his father and paternal grandfather; Heart Disease in his maternal grandfather, maternal grandmother, paternal grandfather, and paternal grandmother; High Blood Pressure in his paternal grandfather; High Cholesterol in his father; Stroke in his maternal grandfather. Allergies   Allergen Reactions    Amitriptyline Other (See Comments)     sedation  Other reaction(s): sedation  sedation    Codeine      States have used this with no issues  Other reaction(s): \"crazy\"  States have used this with no issues    Gabapentin      Neuro toxicity myoclonus     Suprax [Cefixime] Other (See Comments)     Pain       Review of Systems:   All 14 systems were reviewed and are negative  Except for the positive findings which are documented     BP (!) 112/53   Pulse 67   Temp 98.7 °F (37.1 °C) (Oral)   Resp 15   Ht 5' 7\" (1.702 m)   Wt 159 lb 1.6 oz (72.2 kg)   SpO2 92%   BMI 24.92 kg/m²       Intake/Output Summary (Last 24 hours) at 1/15/2022 1034  Last data filed at 1/15/2022 0033  Gross per 24 hour   Intake --   Output 100 ml   Net -100 ml       Physical Exam:  Physical Exam  Vitals reviewed. Cardiovascular:      Rate and Rhythm: Normal rate and regular rhythm. Heart sounds: Murmur heard. Pulmonary:      Effort: Pulmonary effort is normal.      Breath sounds: Normal breath sounds. Musculoskeletal:      Right lower leg: No edema. Left lower leg: No edema. Skin:     General: Skin is warm and dry. Capillary Refill: Capillary refill takes less than 2 seconds. Neurological:      Mental Status: He is alert. He is confused.           Medications:    vancomycin (VANCOCIN) intermittent dosing (placeholder)   Other See Admin Instructions    insulin lispro  0-12 Units SubCUTAneous TID WC    insulin lispro  0-6 Units SubCUTAneous Nightly    apixaban  10 mg Oral BID    tamsulosin  0.4 mg Oral Daily    ARIPiprazole  5 mg Oral Daily    aspirin  81 mg Oral Daily    atorvastatin  80 mg Oral Nightly    busPIRone  5 mg Oral BID    donepezil  10 mg Oral Nightly    fluticasone  2 spray Each Nostril Daily    insulin glargine  25 Units SubCUTAneous Nightly    levothyroxine  100 mcg Oral Daily    memantine  5 mg Oral BID    midodrine  5 mg Oral BID    morphine  15 mg Oral Daily    pantoprazole  40 mg Oral Daily    QUEtiapine  75 mg Oral BID    sertraline  50 mg Oral Daily    traZODone  75 mg Oral Nightly    vitamin D  2,000 Units Oral Daily    sodium chloride flush  5-40 mL IntraVENous 2 times per day    dexamethasone  6 mg IntraVENous Q24H    levoFLOXacin  500 mg Oral Every Other Day      sodium chloride      dextrose       albuterol sulfate HFA, hydrOXYzine, melatonin, sodium chloride flush, sodium chloride, polyethylene glycol, acetaminophen **OR** acetaminophen, glucose, dextrose, glucagon (rDNA), dextrose, prochlorperazine    Lab Data:  CBC:   Recent Labs     01/13/22 0250 01/14/22 0218   WBC 4.0 5.8   HGB 8.1* 8.0*   HCT 26.6* 25.4*   MCV 91.7 89.4   * 147     BMP:   Recent Labs     01/13/22  0250 01/13/22  1935 01/14/22 0218    136 140   K 5.0 3.8 4.4    99 103   CO2 16* 23 21   PHOS  --   --  4.3   BUN 80* 50* 62*   CREATININE 4.7* 2.4* 3.4*     PT/INR:   Recent Labs     01/13/22 0250   PROTIME 14.6*   INR 1.13     BNP:    Recent Labs     01/13/22 0250   PROBNP 972.8*     TROPONIN:   Recent Labs     01/13/22 0250 01/13/22  0936   TROPONINT 0.224* 0.372*              Impression:  Principal Problem:    Pulmonary embolism and infarction [facility])  Resolved Problems:    * No resolved hospital problems. *       All labs, medications and tests reviewed by myself, continue all other medications of all above medical condition listed as is except for changes mentioned above.     Thank you   Please call with questions. Electronically signed by ALENA Cristina CNP on 1/15/2022 at 10:34 AM  I have seen ,spoken to  and examined this patient personally, independently of the nurse practitioner. I have reviewed the hospital care given to date and reviewed all pertinent labs and imaging. The plan was developed mutually at the time of the visit with the patient,  NP  and myself. I have spoken with patient, nursing staff and provided written and verbal instructions . The above note has been reviewed and I agree with the assessment, diagnosis, and treatment plan with changes made by me as follows     CARDIOLOGY ATTENDING ADDENDUM    HPI:  I have reviewed the above HPI  And agree with above   Jolanda Brittle is a 80 y. o.year old who and presents with had concerns including Altered Mental Status (last seen by nursing home staff at 830pm, 70% room air, 80% now on 15 L for EMS, narcan given and awoke a little more ).   Chief Complaint   Patient presents with    Altered Mental Status     last seen by nursing home staff at 830pm, 70% room air, 80% now on 15 L for EMS, narcan given and awoke a little more      Interval history:  No cp    Physical Exam:  General:  Awake, alert, NAD  Head:normal  Eye:normal  Neck:  No JVD   Chest:  Clear to auscultation, respiration easy  Cardiovascular:  RRR S1S2  Abdomen:   nontender  Extremities:  tr edema  Pulses; palpable  Neuro: grossly normal      MEDICAL DECISION MAKING;    I agree with the above plan, which was planned by myself and discussed with NP.  NOAC for now  reasses in 3 mos as OP        Aleks Caballero

## 2022-01-15 NOTE — PROGRESS NOTES
Nephrology Progress Note  1/15/2022 12:09 PM  Subjective: Interval History: Keisha Chadwick is a 80 y.o. male more weak again today in bed awaiting labs      Data:   Scheduled Meds:   vancomycin (VANCOCIN) intermittent dosing (placeholder)   Other See Admin Instructions    insulin lispro  0-12 Units SubCUTAneous TID WC    insulin lispro  0-6 Units SubCUTAneous Nightly    apixaban  10 mg Oral BID    tamsulosin  0.4 mg Oral Daily    ARIPiprazole  5 mg Oral Daily    aspirin  81 mg Oral Daily    atorvastatin  80 mg Oral Nightly    busPIRone  5 mg Oral BID    donepezil  10 mg Oral Nightly    fluticasone  2 spray Each Nostril Daily    insulin glargine  25 Units SubCUTAneous Nightly    levothyroxine  100 mcg Oral Daily    memantine  5 mg Oral BID    midodrine  5 mg Oral BID    morphine  15 mg Oral Daily    pantoprazole  40 mg Oral Daily    QUEtiapine  75 mg Oral BID    sertraline  50 mg Oral Daily    traZODone  75 mg Oral Nightly    vitamin D  2,000 Units Oral Daily    sodium chloride flush  5-40 mL IntraVENous 2 times per day    dexamethasone  6 mg IntraVENous Q24H    levoFLOXacin  500 mg Oral Every Other Day     Continuous Infusions:   sodium chloride      dextrose           CBC   Recent Labs     01/13/22 0250 01/14/22 0218   WBC 4.0 5.8   HGB 8.1* 8.0*   HCT 26.6* 25.4*   * 147      BMP   Recent Labs     01/13/22 0250 01/13/22  1935 01/14/22 0218    136 140   K 5.0 3.8 4.4    99 103   CO2 16* 23 21   PHOS  --   --  4.3   BUN 80* 50* 62*   CREATININE 4.7* 2.4* 3.4*     Hepatic:   Recent Labs     01/13/22 0250 01/14/22 0218   AST 72* 60*   ALT 29 28   BILITOT 0.2 0.3   ALKPHOS 86 79     Troponin: No results for input(s): TROPONINI in the last 72 hours. BNP: No results for input(s): BNP in the last 72 hours. Lipids: No results for input(s): CHOL, HDL in the last 72 hours.     Invalid input(s): LDLCALCU  ABGs:   Lab Results   Component Value Date    PO2ART 97 01/13/2022    DYD2ALS 39.0 01/13/2022     INR:   Recent Labs     01/13/22  0250   INR 1.13     Renal Labs  Albumin:    Lab Results   Component Value Date    LABALBU 3.8 01/14/2022    LABALBU 3.8 01/14/2022    LABALBU 3.8 05/19/2021     Calcium:    Lab Results   Component Value Date    CALCIUM 8.0 01/14/2022     Phosphorus:    Lab Results   Component Value Date    PHOS 4.3 01/14/2022     U/A:    Lab Results   Component Value Date    NITRU NEGATIVE 05/01/2020    COLORU ELIDA 05/01/2020    WBCUA <1 05/01/2020    RBCUA 1 05/01/2020    MUCUS RARE 06/01/2018    TRICHOMONAS NONE SEEN 05/01/2020    BACTERIA NEGATIVE 05/01/2020    CLARITYU CLEAR 05/01/2020    SPECGRAV 1.015 05/01/2020    UROBILINOGEN NORMAL 05/01/2020    BILIRUBINUR NEGATIVE 05/01/2020    BLOODU SMALL 05/01/2020    KETUA NEGATIVE 05/01/2020     ABG:    Lab Results   Component Value Date    EDZ8IHU 39.0 01/13/2022    PO2ART 97 01/13/2022    PGK3GXJ 17.1 01/13/2022     HgBA1c:    Lab Results   Component Value Date    LABA1C 7.4 05/02/2020     Microalbumen/Creatinine ratio:  No components found for: RUCREAT  TSH:  No results found for: TSH  IRON:    Lab Results   Component Value Date    IRON 42 05/04/2020     Iron Saturation:  No components found for: PERCENTFE  TIBC:    Lab Results   Component Value Date    TIBC 168 05/04/2020     FERRITIN:    Lab Results   Component Value Date    FERRITIN 1,627 01/13/2022     RPR:  No results found for: RPR  CE:  No results found for: ANATITER, CE  24 Hour Urine for Creatinine Clearance:  No components found for: CREAT4, UHRS10, UTV10      Objective:   I/O: 01/14 0701 - 01/15 0700  In: 10 [I.V.:10]  Out: 100 [Urine:100]  I/O last 3 completed shifts: In: 510 [I.V.:10]  Out: 960 [Urine:100]  No intake/output data recorded.   Vitals: BP (!) 112/53   Pulse 67   Temp 98.7 °F (37.1 °C) (Oral)   Resp 15   Ht 5' 7\" (1.702 m)   Wt 159 lb 1.6 oz (72.2 kg)   SpO2 92%   BMI 24.92 kg/m²  {  General appearance: awake weak  HEENT: Head: Normal, normocephalic, atraumatic.   Neck: supple, symmetrical, trachea midline  Lungs: diminished breath sounds bilaterally  Heart: S1, S2 normal  Abdomen: abnormal findings:  soft nt  Extremities: edema trace positive AV fistula  Neurologic: Mental status: alertness: Weak fatigue        Assessment and Plan:      IMP:  #1 acute renal failure versus CKD 5 versus end-stage renal disease  #2 COVID-19 pneumonia  #3 positive pulm embolism  #4 hypotension  #5 hepatitis B surface antigen positive  #6 metabolic encephalopathy  #7 metabolic acidosis    Plan     #1 follow-up renal function improved urine output see if renal function can recover to not need long-term dialysis, and unsure yet if patient would want dialysis  #2 treat in above setting for COVID  #3 maintain anticoagulation  #4 blood pressure low stable  #5 treat with isolation with hepatitis B status  #6 monitor affect with above  #7 follow-up bicarb levels  For now supportive care continue working with patient and daughter about long-term plan patient is a DNR CCA and currently unsure if wants more dialysis         Dyan Espinoza MD, MD

## 2022-01-16 LAB
ALBUMIN SERPL-MCNC: 3.5 GM/DL (ref 3.4–5)
AMPHETAMINES: NEGATIVE
ANION GAP SERPL CALCULATED.3IONS-SCNC: 17 MMOL/L (ref 4–16)
BACTERIA: ABNORMAL /HPF
BARBITURATE SCREEN URINE: NEGATIVE
BASOPHILS ABSOLUTE: 0 K/CU MM
BASOPHILS RELATIVE PERCENT: 0 % (ref 0–1)
BENZODIAZEPINE SCREEN, URINE: NEGATIVE
BILIRUBIN URINE: NEGATIVE MG/DL
BLOOD, URINE: ABNORMAL
BUN BLDV-MCNC: 96 MG/DL (ref 6–23)
CALCIUM SERPL-MCNC: 7.7 MG/DL (ref 8.3–10.6)
CANNABINOID SCREEN URINE: NEGATIVE
CHLORIDE BLD-SCNC: 101 MMOL/L (ref 99–110)
CLARITY: CLEAR
CO2: 21 MMOL/L (ref 21–32)
COCAINE METABOLITE: NEGATIVE
COLOR: YELLOW
CREAT SERPL-MCNC: 5.3 MG/DL (ref 0.9–1.3)
DIFFERENTIAL TYPE: ABNORMAL
EOSINOPHILS ABSOLUTE: 0 K/CU MM
EOSINOPHILS RELATIVE PERCENT: 0.2 % (ref 0–3)
GFR AFRICAN AMERICAN: 13 ML/MIN/1.73M2
GFR NON-AFRICAN AMERICAN: 11 ML/MIN/1.73M2
GLUCOSE BLD-MCNC: 146 MG/DL (ref 70–99)
GLUCOSE BLD-MCNC: 148 MG/DL (ref 70–99)
GLUCOSE BLD-MCNC: 159 MG/DL (ref 70–99)
GLUCOSE BLD-MCNC: 190 MG/DL (ref 70–99)
GLUCOSE BLD-MCNC: 224 MG/DL (ref 70–99)
GLUCOSE, URINE: NEGATIVE MG/DL
HCT VFR BLD CALC: 26.2 % (ref 42–52)
HEMOGLOBIN: 8.1 GM/DL (ref 13.5–18)
HEPATITIS B SURFACE ANTIGEN CONFIRMATION: NORMAL
IMMATURE NEUTROPHIL %: 1.6 % (ref 0–0.43)
KETONES, URINE: NEGATIVE MG/DL
LEUKOCYTE ESTERASE, URINE: NEGATIVE
LYMPHOCYTES ABSOLUTE: 0.6 K/CU MM
LYMPHOCYTES RELATIVE PERCENT: 14.1 % (ref 24–44)
MCH RBC QN AUTO: 28 PG (ref 27–31)
MCHC RBC AUTO-ENTMCNC: 30.9 % (ref 32–36)
MCV RBC AUTO: 90.7 FL (ref 78–100)
MONOCYTES ABSOLUTE: 0.3 K/CU MM
MONOCYTES RELATIVE PERCENT: 6.9 % (ref 0–4)
MUCUS: ABNORMAL HPF
NITRITE URINE, QUANTITATIVE: NEGATIVE
NUCLEATED RBC %: 0 %
OPIATES, URINE: ABNORMAL
OXYCODONE: NEGATIVE
PDW BLD-RTO: 14.2 % (ref 11.7–14.9)
PH, URINE: 5 (ref 5–8)
PHENCYCLIDINE, URINE: NEGATIVE
PHOSPHORUS: 5.3 MG/DL (ref 2.5–4.9)
PLATELET # BLD: 151 K/CU MM (ref 140–440)
PMV BLD AUTO: 11.2 FL (ref 7.5–11.1)
POTASSIUM SERPL-SCNC: 4.7 MMOL/L (ref 3.5–5.1)
PROTEIN UA: 30 MG/DL
RBC # BLD: 2.89 M/CU MM (ref 4.6–6.2)
RBC URINE: 3 /HPF (ref 0–3)
SEGMENTED NEUTROPHILS ABSOLUTE COUNT: 3.3 K/CU MM
SEGMENTED NEUTROPHILS RELATIVE PERCENT: 77.2 % (ref 36–66)
SODIUM BLD-SCNC: 139 MMOL/L (ref 135–145)
SPECIFIC GRAVITY UA: 1.02 (ref 1–1.03)
TOTAL IMMATURE NEUTOROPHIL: 0.07 K/CU MM
TOTAL NUCLEATED RBC: 0 K/CU MM
TRICHOMONAS: ABNORMAL /HPF
UROBILINOGEN, URINE: NEGATIVE MG/DL (ref 0.2–1)
WBC # BLD: 4.3 K/CU MM (ref 4–10.5)
WBC UA: 1 /HPF (ref 0–2)

## 2022-01-16 PROCEDURE — 85025 COMPLETE CBC W/AUTO DIFF WBC: CPT

## 2022-01-16 PROCEDURE — 81001 URINALYSIS AUTO W/SCOPE: CPT

## 2022-01-16 PROCEDURE — 80307 DRUG TEST PRSMV CHEM ANLYZR: CPT

## 2022-01-16 PROCEDURE — 1200000000 HC SEMI PRIVATE

## 2022-01-16 PROCEDURE — 94761 N-INVAS EAR/PLS OXIMETRY MLT: CPT

## 2022-01-16 PROCEDURE — 6360000002 HC RX W HCPCS: Performed by: STUDENT IN AN ORGANIZED HEALTH CARE EDUCATION/TRAINING PROGRAM

## 2022-01-16 PROCEDURE — 82962 GLUCOSE BLOOD TEST: CPT

## 2022-01-16 PROCEDURE — 2700000000 HC OXYGEN THERAPY PER DAY

## 2022-01-16 PROCEDURE — 6370000000 HC RX 637 (ALT 250 FOR IP): Performed by: STUDENT IN AN ORGANIZED HEALTH CARE EDUCATION/TRAINING PROGRAM

## 2022-01-16 PROCEDURE — 80069 RENAL FUNCTION PANEL: CPT

## 2022-01-16 PROCEDURE — 2580000003 HC RX 258: Performed by: STUDENT IN AN ORGANIZED HEALTH CARE EDUCATION/TRAINING PROGRAM

## 2022-01-16 PROCEDURE — 36415 COLL VENOUS BLD VENIPUNCTURE: CPT

## 2022-01-16 PROCEDURE — 80053 COMPREHEN METABOLIC PANEL: CPT

## 2022-01-16 PROCEDURE — 6370000000 HC RX 637 (ALT 250 FOR IP): Performed by: HOSPITALIST

## 2022-01-16 RX ADMIN — QUETIAPINE FUMARATE 75 MG: 25 TABLET ORAL at 10:58

## 2022-01-16 RX ADMIN — LEVOTHYROXINE SODIUM 100 MCG: 0.1 TABLET ORAL at 06:06

## 2022-01-16 RX ADMIN — BUSPIRONE HYDROCHLORIDE 5 MG: 5 TABLET ORAL at 22:23

## 2022-01-16 RX ADMIN — FLUTICASONE PROPIONATE 2 SPRAY: 50 SPRAY, METERED NASAL at 11:01

## 2022-01-16 RX ADMIN — TRAZODONE HYDROCHLORIDE 75 MG: 50 TABLET ORAL at 22:20

## 2022-01-16 RX ADMIN — Medication 2000 UNITS: at 10:59

## 2022-01-16 RX ADMIN — SERTRALINE HYDROCHLORIDE 50 MG: 50 TABLET ORAL at 10:59

## 2022-01-16 RX ADMIN — DONEPEZIL HYDROCHLORIDE 10 MG: 5 TABLET, FILM COATED ORAL at 22:23

## 2022-01-16 RX ADMIN — MEMANTINE HYDROCHLORIDE 5 MG: 5 TABLET ORAL at 11:00

## 2022-01-16 RX ADMIN — MIDODRINE HYDROCHLORIDE 5 MG: 5 TABLET ORAL at 10:59

## 2022-01-16 RX ADMIN — APIXABAN 10 MG: 5 TABLET, FILM COATED ORAL at 10:58

## 2022-01-16 RX ADMIN — MIDODRINE HYDROCHLORIDE 5 MG: 5 TABLET ORAL at 22:24

## 2022-01-16 RX ADMIN — ATORVASTATIN CALCIUM 80 MG: 40 TABLET, FILM COATED ORAL at 22:23

## 2022-01-16 RX ADMIN — MEMANTINE HYDROCHLORIDE 5 MG: 5 TABLET ORAL at 22:24

## 2022-01-16 RX ADMIN — TAMSULOSIN HYDROCHLORIDE 0.4 MG: 0.4 CAPSULE ORAL at 22:23

## 2022-01-16 RX ADMIN — SODIUM CHLORIDE, PRESERVATIVE FREE 10 ML: 5 INJECTION INTRAVENOUS at 11:00

## 2022-01-16 RX ADMIN — APIXABAN 10 MG: 5 TABLET, FILM COATED ORAL at 22:23

## 2022-01-16 RX ADMIN — BUSPIRONE HYDROCHLORIDE 5 MG: 5 TABLET ORAL at 10:58

## 2022-01-16 RX ADMIN — QUETIAPINE FUMARATE 75 MG: 25 TABLET ORAL at 22:23

## 2022-01-16 RX ADMIN — PANTOPRAZOLE SODIUM 40 MG: 40 TABLET, DELAYED RELEASE ORAL at 10:59

## 2022-01-16 RX ADMIN — DEXAMETHASONE SODIUM PHOSPHATE 6 MG: 10 INJECTION INTRAMUSCULAR; INTRAVENOUS at 10:59

## 2022-01-16 RX ADMIN — MORPHINE SULFATE 15 MG: 15 TABLET, FILM COATED, EXTENDED RELEASE ORAL at 11:00

## 2022-01-16 RX ADMIN — SODIUM CHLORIDE, PRESERVATIVE FREE 10 ML: 5 INJECTION INTRAVENOUS at 22:22

## 2022-01-16 RX ADMIN — ASPIRIN 81 MG CHEWABLE TABLET 81 MG: 81 TABLET CHEWABLE at 10:58

## 2022-01-16 RX ADMIN — ARIPIPRAZOLE 5 MG: 5 TABLET ORAL at 10:58

## 2022-01-16 ASSESSMENT — PAIN SCALES - GENERAL
PAINLEVEL_OUTOF10: 0
PAINLEVEL_OUTOF10: 0

## 2022-01-16 NOTE — PROGRESS NOTES
syncope most likely secondary to PE,   responded to Narcan  Mentation back to baseline  Creatinine worse, nephrology consulted, underwent dialysis  Hemodialysis per nephrology    CT chest showed bilateral PE,   Continue apixaban at least for 6 months    As needed hydralazine, continue to hold antihypertensives    Sliding scale insulin, Lantus, diabetic diet, glucose range    Continue dexamethasone and famotidine,  Hemoglobin remained stable  As needed antitussives  Droplet and contact precaution  Monitor CRP and procalcitonin  Blood culture positive for coag negative staph, will DC vancomycin DC Levaquin tomorrow    Continue donepezil, Abilify, sertraline, quetiapine    Continue aspirin statin  Continue levothyroxine    Resume MS Contin    Continue Flomax    DVT prophlaxis:   apixaban       Physical Exam Performed:       BP (!) 122/59   Pulse 59   Temp 98 °F (36.7 °C) (Oral)   Resp 17   Ht 5' 7\" (1.702 m)   Wt 156 lb 12.8 oz (71.1 kg)   SpO2 98%   BMI 24.56 kg/m²     Physical Exam  Constitutional:       General: He is not in acute distress. Appearance: Normal appearance. HENT:      Head: Normocephalic and atraumatic. Right Ear: External ear normal.      Left Ear: External ear normal.   Eyes:      Extraocular Movements: Extraocular movements intact. Pupils: Pupils are equal, round, and reactive to light. Cardiovascular:      Rate and Rhythm: Normal rate and regular rhythm. Heart sounds: No murmur heard. Pulmonary:      Effort: Pulmonary effort is normal. No respiratory distress. Breath sounds: Normal breath sounds. No wheezing. Abdominal:      General: Bowel sounds are normal. There is no distension. Palpations: Abdomen is soft. Tenderness: There is no abdominal tenderness. Musculoskeletal:         General: No swelling. Cervical back: Normal range of motion. Skin:     General: Skin is warm. Neurological:      General: No focal deficit present.       Mental Status: He is alert and oriented to person, place, and time. Cranial Nerves: No cranial nerve deficit. Psychiatric:         Mood and Affect: Mood normal.         Labs:   Recent Labs     01/14/22 0218   WBC 5.8   HGB 8.0*   HCT 25.4*        Recent Labs     01/13/22  1935 01/14/22  0218 01/15/22  1445    140 138   K 3.8 4.4 4.8   CL 99 103 101   CO2 23 21 24   BUN 50* 62* 86*   CREATININE 2.4* 3.4* 5.5*   CALCIUM 8.1* 8.0* 7.7*   PHOS  --  4.3 4.5     Recent Labs     01/14/22 0218   AST 60*   ALT 28   BILIDIR 0.2   BILITOT 0.3   ALKPHOS 79     No results for input(s): INR in the last 72 hours. No results for input(s): Wyatt Flores in the last 72 hours. Urinalysis:      Lab Results   Component Value Date    NITRU NEGATIVE 05/01/2020    WBCUA <1 05/01/2020    BACTERIA NEGATIVE 05/01/2020    RBCUA 1 05/01/2020    BLOODU SMALL 05/01/2020    SPECGRAV 1.015 05/01/2020       Radiology:  XR CHEST PORTABLE   Final Result   Mild right basilar opacity. Considerations include atelectasis and mild   pneumonia. Mild cardiac silhouette enlargement. Mild central vascular congestion. CTA CHEST W CONTRAST   Final Result   1. Suspected small right middle lobe segmental and subsegmental pulmonary   embolus. Motion significantly degrades the quality of the exam.   2. Small foci of peripheral consolidation in the upper lobes. Consider mild   COVID pneumonia. 3. Mild bibasilar atelectasis or pneumonia. 4. Splenomegaly. 5. Distended gallbladder. Mild intra and extrahepatic biliary ductal   dilatation. This report was discussed with Dr. Jovon Silva at 3:53 a.m. on 01/13/2022. Chucky Bourne CTA HEAD NECK W CONTRAST   Final Result   1. No large vessel occlusion in the head or neck. 2.  Mild-to-moderate atherosclerotic disease.       3.  Scattered heterogeneous opacities in the visualized lung apices may   relate to an atypical infectious/inflammatory process including atypical or viral/COVID-19 pneumonia versus mild pulmonary edema. 4.  C4-C5 osseous sclerosis and endplate erosions may relate to advanced   degenerative changes. Underlying discitis/osteomyelitis is difficult to   entirely exclude and could be further evaluated with follow-up cervical spine   MRI. CT HEAD WO CONTRAST   Final Result   No acute intracranial hemorrhage, mass effect, midline shift, or   hydrocephalus. Streak artifact over the frontal lobes but no sign of an   acute territorial infarct. Volume loss with prominence of the ventricles and sulci are stable. Critical results were called by Dr. Otilia Broussard MD to Dr Crystal Olmos   on 1/13/2022 at 02:42.                  Koko Rashid MD  1/16/2022 11:09 AM

## 2022-01-16 NOTE — PROGRESS NOTES
Nephrology Progress Note  1/16/2022 12:27 PM  Subjective: Interval History: Senia Maria is a 80 y.o. male overall weak and tired resting in bed  Data:   Scheduled Meds:   insulin lispro  0-12 Units SubCUTAneous TID WC    insulin lispro  0-6 Units SubCUTAneous Nightly    apixaban  10 mg Oral BID    tamsulosin  0.4 mg Oral Daily    ARIPiprazole  5 mg Oral Daily    aspirin  81 mg Oral Daily    atorvastatin  80 mg Oral Nightly    busPIRone  5 mg Oral BID    donepezil  10 mg Oral Nightly    fluticasone  2 spray Each Nostril Daily    insulin glargine  25 Units SubCUTAneous Nightly    levothyroxine  100 mcg Oral Daily    memantine  5 mg Oral BID    midodrine  5 mg Oral BID    morphine  15 mg Oral Daily    pantoprazole  40 mg Oral Daily    QUEtiapine  75 mg Oral BID    sertraline  50 mg Oral Daily    traZODone  75 mg Oral Nightly    vitamin D  2,000 Units Oral Daily    sodium chloride flush  5-40 mL IntraVENous 2 times per day    dexamethasone  6 mg IntraVENous Q24H    levoFLOXacin  500 mg Oral Every Other Day     Continuous Infusions:   sodium chloride      dextrose           CBC   Recent Labs     01/14/22 0218   WBC 5.8   HGB 8.0*   HCT 25.4*         BMP   Recent Labs     01/13/22  1935 01/14/22 0218 01/15/22  1445    140 138   K 3.8 4.4 4.8   CL 99 103 101   CO2 23 21 24   PHOS  --  4.3 4.5   BUN 50* 62* 86*   CREATININE 2.4* 3.4* 5.5*     Hepatic:   Recent Labs     01/14/22 0218   AST 60*   ALT 28   BILITOT 0.3   ALKPHOS 79     Troponin: No results for input(s): TROPONINI in the last 72 hours. BNP: No results for input(s): BNP in the last 72 hours. Lipids: No results for input(s): CHOL, HDL in the last 72 hours. Invalid input(s): LDLCALCU  ABGs:   Lab Results   Component Value Date    PO2ART 97 01/13/2022    TSQ2ESH 39.0 01/13/2022     INR:   No results for input(s): INR in the last 72 hours.   Renal Labs  Albumin:    Lab Results   Component Value Date    LABALBU 3.5 01/15/2022    LABALBU 3.8 05/19/2021     Calcium:    Lab Results   Component Value Date    CALCIUM 7.7 01/15/2022     Phosphorus:    Lab Results   Component Value Date    PHOS 4.5 01/15/2022     U/A:    Lab Results   Component Value Date    NITRU NEGATIVE 05/01/2020    COLORU ELIDA 05/01/2020    WBCUA <1 05/01/2020    RBCUA 1 05/01/2020    MUCUS RARE 06/01/2018    TRICHOMONAS NONE SEEN 05/01/2020    BACTERIA NEGATIVE 05/01/2020    CLARITYU CLEAR 05/01/2020    SPECGRAV 1.015 05/01/2020    UROBILINOGEN NORMAL 05/01/2020    BILIRUBINUR NEGATIVE 05/01/2020    BLOODU SMALL 05/01/2020    KETUA NEGATIVE 05/01/2020     ABG:    Lab Results   Component Value Date    FIB8VBT 39.0 01/13/2022    PO2ART 97 01/13/2022    XWS9XIR 17.1 01/13/2022     HgBA1c:    Lab Results   Component Value Date    LABA1C 7.4 05/02/2020     Microalbumen/Creatinine ratio:  No components found for: RUCREAT  TSH:  No results found for: TSH  IRON:    Lab Results   Component Value Date    IRON 42 05/04/2020     Iron Saturation:  No components found for: PERCENTFE  TIBC:    Lab Results   Component Value Date    TIBC 168 05/04/2020     FERRITIN:    Lab Results   Component Value Date    FERRITIN 1,627 01/13/2022     RPR:  No results found for: RPR  CE:  No results found for: ANATITER, CE  24 Hour Urine for Creatinine Clearance:  No components found for: CREAT4, UHRS10, UTV10      Objective:   I/O: No intake/output data recorded. I/O last 3 completed shifts:  In: -   Out: 100 [Urine:100]  No intake/output data recorded. Vitals: BP (!) 122/59   Pulse 59   Temp 98 °F (36.7 °C) (Oral)   Resp 17   Ht 5' 7\" (1.702 m)   Wt 156 lb 12.8 oz (71.1 kg)   SpO2 98%   BMI 24.56 kg/m²  {  General appearance: awake weak  HEENT: Head: Normal, normocephalic, atraumatic.   Neck: supple, symmetrical, trachea midline  Lungs: diminished breath sounds bilaterally  Heart: S1, S2 normal  Abdomen: abnormal findings:  soft nt  Extremities: edema trace positive AV fistula  Neurologic: Mental status: alertness: Weak fatigue        Assessment and Plan:      IMP:  #1 acute renal failure versus CKD 5 versus end-stage renal disease  #2 COVID-19 pneumonia  #3 positive pulm embolism  #4 hypotension  #5 hepatitis B surface antigen positive  #6 metabolic encephalopathy  #7 metabolic acidosis    Plan     #1 at the moment is not be recovering renal function we will plan on dialysis tomorrow  #2 if need to stay on dialysis we will need to make arrangements as an outpatient also of note patient is COVID-positive some but may affect how he gets his dialysis and therapy and needs to be in isolation room  #3 treat in above setting with COVID  #4 maintain anticoagulation  #5 blood pressure holding stable  #6 follow-up repeat hepatitis B quantitative result?   It may be had recent immunization  #7 monitor affect  #8 treat acidosis with dialysis  #9 left message for patient's daughter the POA patient is agreeable to dialysis and will set up for tomorrow, follow-up blood cultures likely contamination         Mary Bell MD, MD

## 2022-01-16 NOTE — PROGRESS NOTES
I was able get a hold of the patient's daughter the MICHAEL Chowdhury. I did make her aware of the hepatitis B antigen positive results and probably need to recheck just to make sure it is accurate. In addition I told the patient will be getting dialysis tomorrow and she is agreeable to that and if needs long-term outpatient dialysis she agrees to that if he is agreeable.

## 2022-01-17 LAB
ALBUMIN SERPL-MCNC: 3.6 GM/DL (ref 3.4–5)
ANION GAP SERPL CALCULATED.3IONS-SCNC: 12 MMOL/L (ref 4–16)
BASOPHILS ABSOLUTE: 0 K/CU MM
BASOPHILS RELATIVE PERCENT: 0 % (ref 0–1)
BUN BLDV-MCNC: 98 MG/DL (ref 6–23)
CALCIUM SERPL-MCNC: 8.1 MG/DL (ref 8.3–10.6)
CHLORIDE BLD-SCNC: 104 MMOL/L (ref 99–110)
CO2: 23 MMOL/L (ref 21–32)
CREAT SERPL-MCNC: 4.6 MG/DL (ref 0.9–1.3)
DIFFERENTIAL TYPE: ABNORMAL
EOSINOPHILS ABSOLUTE: 0 K/CU MM
EOSINOPHILS RELATIVE PERCENT: 0.4 % (ref 0–3)
GFR AFRICAN AMERICAN: 15 ML/MIN/1.73M2
GFR NON-AFRICAN AMERICAN: 12 ML/MIN/1.73M2
GLUCOSE BLD-MCNC: 143 MG/DL (ref 70–99)
GLUCOSE BLD-MCNC: 148 MG/DL (ref 70–99)
GLUCOSE BLD-MCNC: 154 MG/DL (ref 70–99)
GLUCOSE BLD-MCNC: 156 MG/DL (ref 70–99)
GLUCOSE BLD-MCNC: 164 MG/DL (ref 70–99)
HCT VFR BLD CALC: 26.5 % (ref 42–52)
HEMOGLOBIN: 8.2 GM/DL (ref 13.5–18)
IMMATURE NEUTROPHIL %: 1.9 % (ref 0–0.43)
LEGIONELLA URINARY AG: NEGATIVE
LYMPHOCYTES ABSOLUTE: 0.7 K/CU MM
LYMPHOCYTES RELATIVE PERCENT: 12.7 % (ref 24–44)
MCH RBC QN AUTO: 28 PG (ref 27–31)
MCHC RBC AUTO-ENTMCNC: 30.9 % (ref 32–36)
MCV RBC AUTO: 90.4 FL (ref 78–100)
MONOCYTES ABSOLUTE: 0.2 K/CU MM
MONOCYTES RELATIVE PERCENT: 4.5 % (ref 0–4)
NUCLEATED RBC %: 0 %
PDW BLD-RTO: 14 % (ref 11.7–14.9)
PHOSPHORUS: 3.6 MG/DL (ref 2.5–4.9)
PLATELET # BLD: 168 K/CU MM (ref 140–440)
PMV BLD AUTO: 11.3 FL (ref 7.5–11.1)
POTASSIUM SERPL-SCNC: 4.9 MMOL/L (ref 3.5–5.1)
RBC # BLD: 2.93 M/CU MM (ref 4.6–6.2)
SEGMENTED NEUTROPHILS ABSOLUTE COUNT: 4.3 K/CU MM
SEGMENTED NEUTROPHILS RELATIVE PERCENT: 80.5 % (ref 36–66)
SODIUM BLD-SCNC: 139 MMOL/L (ref 135–145)
STREP PNEUMONIAE ANTIGEN: NORMAL
TOTAL IMMATURE NEUTOROPHIL: 0.1 K/CU MM
TOTAL NUCLEATED RBC: 0 K/CU MM
WBC # BLD: 5.4 K/CU MM (ref 4–10.5)

## 2022-01-17 PROCEDURE — 2700000000 HC OXYGEN THERAPY PER DAY

## 2022-01-17 PROCEDURE — 92526 ORAL FUNCTION THERAPY: CPT

## 2022-01-17 PROCEDURE — 80053 COMPREHEN METABOLIC PANEL: CPT

## 2022-01-17 PROCEDURE — 90935 HEMODIALYSIS ONE EVALUATION: CPT

## 2022-01-17 PROCEDURE — 2580000003 HC RX 258: Performed by: STUDENT IN AN ORGANIZED HEALTH CARE EDUCATION/TRAINING PROGRAM

## 2022-01-17 PROCEDURE — 82962 GLUCOSE BLOOD TEST: CPT

## 2022-01-17 PROCEDURE — 87340 HEPATITIS B SURFACE AG IA: CPT

## 2022-01-17 PROCEDURE — 1200000000 HC SEMI PRIVATE

## 2022-01-17 PROCEDURE — 85025 COMPLETE CBC W/AUTO DIFF WBC: CPT

## 2022-01-17 PROCEDURE — 6370000000 HC RX 637 (ALT 250 FOR IP): Performed by: HOSPITALIST

## 2022-01-17 PROCEDURE — 80069 RENAL FUNCTION PANEL: CPT

## 2022-01-17 PROCEDURE — 6360000002 HC RX W HCPCS: Performed by: STUDENT IN AN ORGANIZED HEALTH CARE EDUCATION/TRAINING PROGRAM

## 2022-01-17 PROCEDURE — 94761 N-INVAS EAR/PLS OXIMETRY MLT: CPT

## 2022-01-17 PROCEDURE — 6370000000 HC RX 637 (ALT 250 FOR IP): Performed by: STUDENT IN AN ORGANIZED HEALTH CARE EDUCATION/TRAINING PROGRAM

## 2022-01-17 RX ADMIN — MIDODRINE HYDROCHLORIDE 5 MG: 5 TABLET ORAL at 08:01

## 2022-01-17 RX ADMIN — TAMSULOSIN HYDROCHLORIDE 0.4 MG: 0.4 CAPSULE ORAL at 20:33

## 2022-01-17 RX ADMIN — SERTRALINE HYDROCHLORIDE 50 MG: 50 TABLET ORAL at 08:01

## 2022-01-17 RX ADMIN — LEVOFLOXACIN 500 MG: 500 TABLET, FILM COATED ORAL at 08:02

## 2022-01-17 RX ADMIN — SODIUM CHLORIDE, PRESERVATIVE FREE 10 ML: 5 INJECTION INTRAVENOUS at 08:03

## 2022-01-17 RX ADMIN — SODIUM CHLORIDE, PRESERVATIVE FREE 10 ML: 5 INJECTION INTRAVENOUS at 20:33

## 2022-01-17 RX ADMIN — QUETIAPINE FUMARATE 75 MG: 25 TABLET ORAL at 20:33

## 2022-01-17 RX ADMIN — LEVOTHYROXINE SODIUM 100 MCG: 0.1 TABLET ORAL at 06:30

## 2022-01-17 RX ADMIN — BUSPIRONE HYDROCHLORIDE 5 MG: 5 TABLET ORAL at 08:01

## 2022-01-17 RX ADMIN — PANTOPRAZOLE SODIUM 40 MG: 40 TABLET, DELAYED RELEASE ORAL at 08:02

## 2022-01-17 RX ADMIN — Medication 2000 UNITS: at 08:01

## 2022-01-17 RX ADMIN — ARIPIPRAZOLE 5 MG: 5 TABLET ORAL at 08:01

## 2022-01-17 RX ADMIN — MEMANTINE HYDROCHLORIDE 5 MG: 5 TABLET ORAL at 20:33

## 2022-01-17 RX ADMIN — DONEPEZIL HYDROCHLORIDE 10 MG: 5 TABLET, FILM COATED ORAL at 20:33

## 2022-01-17 RX ADMIN — ASPIRIN 81 MG CHEWABLE TABLET 81 MG: 81 TABLET CHEWABLE at 08:00

## 2022-01-17 RX ADMIN — QUETIAPINE FUMARATE 75 MG: 25 TABLET ORAL at 08:00

## 2022-01-17 RX ADMIN — ACETAMINOPHEN 650 MG: 325 TABLET ORAL at 08:02

## 2022-01-17 RX ADMIN — TRAZODONE HYDROCHLORIDE 75 MG: 50 TABLET ORAL at 20:33

## 2022-01-17 RX ADMIN — APIXABAN 10 MG: 5 TABLET, FILM COATED ORAL at 21:47

## 2022-01-17 RX ADMIN — APIXABAN 10 MG: 5 TABLET, FILM COATED ORAL at 08:01

## 2022-01-17 RX ADMIN — DEXAMETHASONE SODIUM PHOSPHATE 6 MG: 10 INJECTION INTRAMUSCULAR; INTRAVENOUS at 08:02

## 2022-01-17 RX ADMIN — MEMANTINE HYDROCHLORIDE 5 MG: 5 TABLET ORAL at 08:01

## 2022-01-17 RX ADMIN — MIDODRINE HYDROCHLORIDE 5 MG: 5 TABLET ORAL at 20:33

## 2022-01-17 RX ADMIN — MORPHINE SULFATE 15 MG: 15 TABLET, FILM COATED, EXTENDED RELEASE ORAL at 08:01

## 2022-01-17 RX ADMIN — BUSPIRONE HYDROCHLORIDE 5 MG: 5 TABLET ORAL at 20:33

## 2022-01-17 RX ADMIN — INSULIN GLARGINE 25 UNITS: 100 INJECTION, SOLUTION SUBCUTANEOUS at 20:33

## 2022-01-17 RX ADMIN — ATORVASTATIN CALCIUM 80 MG: 40 TABLET, FILM COATED ORAL at 20:33

## 2022-01-17 ASSESSMENT — PAIN SCALES - GENERAL
PAINLEVEL_OUTOF10: 8
PAINLEVEL_OUTOF10: 0
PAINLEVEL_OUTOF10: 0

## 2022-01-17 NOTE — PROGRESS NOTES
[facility] HOSPITALIST PROGRESS NOTE      PCP: Gabby Nguyen MD    Date of Admission: 1/13/2022    Subjective: feels better     Brief Hospital summary patient is a 82-year-old male with history of hypertension, hyperlipidemia, CVA, diabetes mellitus, dementia, hyperlipidemia and end-stage renal disease was admitted with hypoxia and unresponsiveness. Patient was saturating 70s in the ER, had pinpoint pupils improved after Narcan given, CT showed bilateral PE, positive for COVID, heparin infusion started  Patient was admitted to ICU, cardiology and nephrology consulted  Underwent dialysis, switched to apixaban  Oxygen requirement improved      Vitals signs: Afebrile, heart rate 70s range, blood pressure 110/61, on 2 L of oxygen    Medications: Apixaban, Abilify, aspirin, Lipitor, buspirone, dexamethasone, donepezil, Lantus, sliding scale insulin, levothyroxine, levofloxacin, MS Contin, midodrine, memantine, sertraline, pantoprazole, Seroquel, vancomycin    Antibiotics: *Levofloxacin day 5  Vancomycin stopped     Fluid status: Urine output not documented    Labs: Na 13, K 4.9, Cl 104, Cr 4.6   WBC 5.4, Hb 8.2, Plt 168    Imaging:   CT chest without contrast     1. Suspected small right middle lobe segmental and subsegmental pulmonary   embolus.  Motion significantly degrades the quality of the exam.   2. Small foci of peripheral consolidation in the upper lobes.  Consider mild   COVID pneumonia. 3. Mild bibasilar atelectasis or pneumonia. 4. Splenomegaly.    5. Distended gallbladder.  Mild intra and extrahepatic biliary ductal   Dilatation    Assessment/Plan:     Acute metabolic encephalopathy  Severe sepsis secondary to COVID-19 pneumonia with acute hypoxic respiratory failure  Pulmonary embolism bilateral  History of dementia  History of mood disorder  Chronic anemia  Mild elevated troponin  Continuous opioid dependence  End-stage renal disease  Diabetes mellitus type 2  Chronic hypertension  Hyperlipidemia  Hypothyroidism        Admitted secondary to syncope most likely secondary to PE,   responded to Narcan  Mentation back to baseline   Creatinine worse, nephrology consulted, underwent dialysis  Hemodialysis per nephrology   AV fistula plan    CT chest showed bilateral PE,   Continue apixaban at least for 6 months    As needed hydralazine, continue to hold antihypertensives    Sliding scale insulin, Lantus, diabetic diet, glucose range 148-224    Continue dexamethasone and famotidine,  Remains on 2 L of oxygen  As needed antitussives  Droplet and contact precaution  Monitor CRP and procalcitonin  Blood culture positive for coag negative staph,   Vancomycin stopped  DC Levaquin in am     Continue donepezil, Abilify, sertraline, quetiapine    Continue aspirin statin  Continue levothyroxine    Resume MS Contin    Continue Flomax    DVT prophlaxis:   apixaban       Physical Exam Performed:       /61   Pulse 75   Temp 97.7 °F (36.5 °C) (Oral)   Resp 16   Ht 5' 7\" (1.702 m)   Wt 156 lb 12.8 oz (71.1 kg)   SpO2 94%   BMI 24.56 kg/m²     Physical Exam  Constitutional:       General: He is not in acute distress. Appearance: Normal appearance. HENT:      Head: Normocephalic and atraumatic. Right Ear: External ear normal.      Left Ear: External ear normal.   Eyes:      Extraocular Movements: Extraocular movements intact. Pupils: Pupils are equal, round, and reactive to light. Cardiovascular:      Rate and Rhythm: Normal rate and regular rhythm. Heart sounds: No murmur heard. Pulmonary:      Effort: Pulmonary effort is normal. No respiratory distress. Breath sounds: Normal breath sounds. No wheezing. Abdominal:      General: Bowel sounds are normal. There is no distension. Palpations: Abdomen is soft. Tenderness: There is no abdominal tenderness. Musculoskeletal:         General: No swelling. Cervical back: Normal range of motion. Skin:     General: Skin is warm. Neurological:      General: No focal deficit present. Mental Status: He is alert and oriented to person, place, and time. Cranial Nerves: No cranial nerve deficit. Psychiatric:         Mood and Affect: Mood normal.         Labs:   Recent Labs     01/16/22  1245   WBC 4.3   HGB 8.1*   HCT 26.2*        Recent Labs     01/15/22  1445 01/16/22  1245    139   K 4.8 4.7    101   CO2 24 21   BUN 86* 96*   CREATININE 5.5* 5.3*   CALCIUM 7.7* 7.7*   PHOS 4.5 5.3*     No results for input(s): AST, ALT, BILIDIR, BILITOT, ALKPHOS in the last 72 hours. No results for input(s): INR in the last 72 hours. No results for input(s): Stevie Grates in the last 72 hours. Urinalysis:      Lab Results   Component Value Date    NITRU NEGATIVE 01/16/2022    WBCUA 1 01/16/2022    BACTERIA RARE 01/16/2022    RBCUA 3 01/16/2022    BLOODU SMALL 01/16/2022    SPECGRAV 1.021 01/16/2022       Radiology:  XR CHEST PORTABLE   Final Result   Mild right basilar opacity. Considerations include atelectasis and mild   pneumonia. Mild cardiac silhouette enlargement. Mild central vascular congestion. CTA CHEST W CONTRAST   Final Result   1. Suspected small right middle lobe segmental and subsegmental pulmonary   embolus. Motion significantly degrades the quality of the exam.   2. Small foci of peripheral consolidation in the upper lobes. Consider mild   COVID pneumonia. 3. Mild bibasilar atelectasis or pneumonia. 4. Splenomegaly. 5. Distended gallbladder. Mild intra and extrahepatic biliary ductal   dilatation. This report was discussed with Dr. Anastasia Torres at 3:53 a.m. on 01/13/2022. Clare Rai CTA HEAD NECK W CONTRAST   Final Result   1. No large vessel occlusion in the head or neck. 2.  Mild-to-moderate atherosclerotic disease.       3.  Scattered heterogeneous opacities in the visualized lung apices may   relate to an atypical infectious/inflammatory process including atypical or   viral/COVID-19 pneumonia versus mild pulmonary edema. 4.  C4-C5 osseous sclerosis and endplate erosions may relate to advanced   degenerative changes. Underlying discitis/osteomyelitis is difficult to   entirely exclude and could be further evaluated with follow-up cervical spine   MRI. CT HEAD WO CONTRAST   Final Result   No acute intracranial hemorrhage, mass effect, midline shift, or   hydrocephalus. Streak artifact over the frontal lobes but no sign of an   acute territorial infarct. Volume loss with prominence of the ventricles and sulci are stable. Critical results were called by Dr. Juana Mireles MD to Dr Bijal Leone   on 1/13/2022 at 02:42.                  Mrayellen Falcon MD  1/17/2022 10:28 AM

## 2022-01-17 NOTE — PROGRESS NOTES
[id number] [facility] OF SPEECH/LANGUAGE PATHOLOGY  DAILY PROGRESS NOTE  Yoly Cornelius  1/17/2022  [id number]  Pulmonary embolism and infarction ([address] [I26.99]  Elevated troponin [R77.8]  Acute respiratory failure, unspecified whether with hypoxia or hypercapnia (HCC) [J96.00]  Altered mental status, unspecified altered mental status type [R41.82]  Single subsegmental pulmonary embolism without acute cor pulmonale (HCC) [I26.93]  Pneumonia due to COVID-19 virus [U07.1, J12.82]  Allergies   Allergen Reactions    Amitriptyline Other (See Comments)     sedation  Other reaction(s): sedation  sedation    Codeine      States have used this with no issues  Other reaction(s): \"crazy\"  States have used this with no issues    Gabapentin      Neuro toxicity myoclonus     Suprax [Cefixime] Other (See Comments)     Pain         Pt was seen this date for dysphagia treatment. IMPRESSION AND RECOMMENDATIONS: Yoly Cornelius was seen for dysphagia follow-up. He was seated upright in bed, alert, cooperative. Reassessment for possible diet advancement completed. Pt presented with PO trials of thin liquids via cup/straw, jello, and regular solids. Oral stage impaired d/t missing dentition; pt unable to bite into hard/dry solid consistency. Otherwise oral stage intact for jello and liquids via cup/straw. Pharyngeal stage appears WFL with intact swallow initiation/laryngeal elevation and no s/s aspiration. Pt denies any concerns for dysphagia at this time. Recommend continued minced and moist diet, thin liquids via cup or straw. Continue to assist with setup of meals and ensure HOB is elevated so that pt is seated upright for all intake. No further acute SLP needs identified.       GOALS (current status in bold):  Short-term Goals  Timeframe for Short-term Goals: length of admission  Goal 1: Pt will tolerate minced/moist diet and thin liquids with adequate mastication/clearance and no s/s aspiration. Met  Goal 2: Pt will tolerate PO trials of advanced textures with adequate oral manipulation/clearance and no s/s aspiration for safe diet upgrade. Partially met; pt unable to masticate regular solids d/t edentulism  Goal 3: Pt/caregivers will indicate understanding of all recommendations.  Met    EDUCATION: recommendations/plan, strategies as above    PAIN RATING (0-10 Scale): 0/denies  Time in/Time out: SLP Individual Minutes  Time In: 1340  Time Out: 0372  Minutes: 15    Visit number: [address] MA [facility]  1/17/2022  3:03 PM

## 2022-01-17 NOTE — CARE COORDINATION
CM reviewed notes. CM called the AL at [facility]. Pt is resident. CM discussed the Covid status. CM informed that they will call CM back with determination. CM placed a PS to Dr Charlotte Ni for PT/OT orders to help with discharge planning. CM requested PT/OT orders for discharge planning.  [address]

## 2022-01-17 NOTE — PROGRESS NOTES
Pt had 2 hours 45 min HD today, requesting to come off 15 min early. Removed 1.3 L fluid. Tolerated well, pt became slightly tachycardic (low 100's) with brief moments of vtach on monitor. Left AVF functioned well, pressure dressing to both needle sites. Report to Joseline Prado RN. (Overseen this treatment completed by JEANNETTE Arboleda)    Patient Name: Linda Nieves  Patient : 1940  MRN: [id number]     Acct: [de-identified]  Date of Admission: 2022  Room/Bed: 3006/3006-A  Code Status:  Limited  Allergies:    Allergies   Allergen Reactions    Amitriptyline Other (See Comments)     sedation  Other reaction(s): sedation  sedation    Codeine      States have used this with no issues  Other reaction(s): \"crazy\"  States have used this with no issues    Gabapentin      Neuro toxicity myoclonus     Suprax [Cefixime] Other (See Comments)     Pain     Diagnosis:    Patient Active Problem List   Diagnosis    Altered mental status    Essential hypertension    Low back pain    Acquired hypothyroidism    Panic disorder    Dystonia    Chronic kidney disease (CKD) stage G3a/A3, moderately decreased glomerular filtration rate (GFR) between 45-59 mL/min/1.73 square meter and albuminuria creatinine ratio greater than 300 mg/g (HCC)    Type 2 diabetes mellitus without complication (HCC)    Ambulatory dysfunction    Non compliance w medication regimen    Hyperlipidemia    Closed fracture of distal end of fibula    Acute metabolic encephalopathy    SO (acute kidney injury) ([address]    Encephalopathy acute    Closed displaced fracture of shaft of second metacarpal bone of right hand    Moderate episode of recurrent major depressive disorder (HCC)    Generalized anxiety disorder    ESRD (end stage renal disease) ([address]    ESRD on hemodialysis ([address]    AVF (arteriovenous fistula) (HCC)    Hyperkalemia    Chronic kidney disease, stage IV (severe) (HCC)    Acquired renal cyst    Allergic rhinitis    Anemia due to stage 4 chronic kidney disease (HCC)    Balance disorder    Elevated homocysteine    Gastroesophageal reflux disease    Impaired mobility    Insomnia due to other mental disorder    Iron deficiency    Memory impairment    Microalbuminuria due to type 2 diabetes mellitus (HCC)    Mononeuritis    Right inguinal hernia    Schizoaffective disorder (HCC)    Seasonal allergies    Severe episode of recurrent major depressive disorder, without psychotic features ([facility]    Spasmodic torticollis    Spinal stenosis of cervical region    Stenosis of both internal carotid arteries    Visual disturbance    Vitamin B12 deficiency    Vitamin D deficiency    Pulmonary embolism and infarction ([facility]    Acute respiratory failure (HCC)         Treatment:  Hemodilaysis 2:1  Priority: Routine  Location: Acute Room    Diabetic: Yes  NPO: No  Isolation Precautions: Airborne     Consent for Treatment Verified: Yes  Blood Consent Verified: Not Applicable     Safety Verified: Identify (I), Consent (C), Equipment (E), HepB Status (B), Orders Complete (O), Access Verified (A) and Timeliness (T)  Time out performed prior to access at 1511 hours. Report Received from Primary RN at 1442 hours.   Primary RN (First Initial, Last Name, Title): Dayne FELIPE  Incapacitated Nurse Education Completed: Yes     HBsAg ONLY:  Date Drawn: January 13, 2022       Results: Positive  HBsAb:  Date Drawn:  January 13, 2022       Results: Unknown    Order  Dialysis Bath  K+ (Potassium): 3  Ca+ (Calcium): 2.5  Na+ (Sodium): 138  HCO3 (Bicarb): 35     Na+ Modeling: Not Applicable  Dialyzer: T075  Dialysate Temperature (C):  35  Blood Flow Rate (BFR):  300   Dialysate Flow Rate (DFR):   600        Access to be Utilized   Access: AVF  Location: Upper Extremity  Side: Left   Needle gauge:  16  + Bruit/Thrill: Yes    First Use X-ray Verified: Not Applicable  OK to use line order: Not Applicable    Site Assessment:  Signs and Symptoms of Infection/Inflammation: None  If yes: Not Applicable  Dressing: Dry and Intact  Site Prep: Medical Aseptic Technique  Dressing Changed this Treatment: No  If yes, by whom: NA - not changed today  Date of Last Dressing Change: Not Applicable  Antimicrobial Patch in place?: No  Red Alcohol Caps in place?: No  Gauze Dressing?: No  Non Dialysis Use?: No  Comment:    Flows: Good, Patent  If access problem, who was notified:     Pre and Post-Assessment  Patient Vitals for the past 8 hrs:   Level of Consciousness Heart Rhythm Respiratory Quality/Effort O2 Device Bilateral Breath Sounds Skin Color Skin Condition/Temp Abdomen Inspection Bowel Sounds (All Quadrants) RLE Edema LLE Edema Comments Pain Level   01/17/22 1213 -- -- -- Nasal cannula -- -- -- -- -- -- -- -- --   01/17/22 1505 Alert (0) Regular Unlabored Nasal cannula Diminished Appropriate for ethnicity Dry; Warm Soft;Non-distended Active Non-pitting Non-pitting Consent verified. Pre treatment vitals/assessment completed. --   01/17/22 1904 Alert (0) -- -- Nasal cannula -- -- -- -- -- -- -- -- 0     Labs  Recent Labs     01/16/22  1245 01/17/22  1307   WBC 4.3 5.4   HGB 8.1* 8.2*   HCT 26.2* 26.5*    168                                                                  Recent Labs     01/15/22  1445 01/16/22  1245 01/17/22  1307    139 139   K 4.8 4.7 4.9    101 104   CO2 24 21 23   BUN 86* 96* 98*   CREATININE 5.5* 5.3* 4.6*   GLUCOSE 113* 146* 143*     IV Drips and Rate/Dose   sodium chloride      dextrose        Safety - Before each treatment:   Dialysis Machine No.: [id number]  RO Machine No.: [id number]  Dialyzer Lot No.: [id number]  RO Machine Log Sheet Completed: Yes  Machine Alarm Self Test: Completed; Passed (01/17/22 1445)  Machine Autotest: Completed,Passed  Air Foam Detector: [employer] Function  Extracorporeal Circuit Tested for Integrity: Yes  Machine Conductivity: 14  Manual Conductivity: 14     Bicarbonate Concentrate Lot No.: [id number]  Acid Concentrate Lot No.: [id number]  Manual Ph: 7.2  Bleach Test (Neg): Yes  Bath Temperature: 95 °F (35 °C)  Tubing Lot#: [id number]  Conductivity Meter Serial #: [id number]  All Connections Secure?: Yes  Venous Parameters Set?: Yes  Arterial Parameters Set?: Yes  Saline Line Double Clamped?: Yes  Air Foam Detector Engaged?: Yes  Machine Functioning Alarm Free? Yes  Prime Given: 200ml    Chlorine Testing - Before each treatment and every 4 hours:   Treatment  Time On: 1519  Time Off: 2033  Treatment Goal: 1  Weight: 156 lb 12.8 oz (71.1 kg) (01/16/22 0258)  1st check: less than 0.1 ppm at: 1430 hours  2nd check: less than 0.1 ppm at: 1740 hours  3rd check: Not Applicable  (if greater than 0.1 ppm, then check every 30 minutes from secondary)    Access Flows and Pressures  Patient Vitals for the past 8 hrs:   Blood Flow Rate (ml/min) Ultrafiltration Rate (ml/hr) Ultrafiltration Total Arterial Pressure (mmHg) Venous Pressure (mmHg) TMP Hemodialysis Conductivity DFR Comments Access Visible   01/17/22 1519 200 ml/min 830 ml/hr 71 ml -10 mmHg 100 70 14 600 tx started. cannulated wo issues. Yes   01/17/22 1530 300 ml/min 830 ml/hr 185 ml -80 mmHg 170 70 14 600 pt resting w eyes closed. states he wants me to stay w him so he doesn;t die. Yes   01/17/22 1545 300 ml/min 830 ml/hr 460 ml -80 mmHg 180 70 14 600 lines secure to pt.pt asking how much longer Yes   01/17/22 1600 300 ml/min 830 ml/hr 596 ml -50 mmHg 180 70 14 600 pt talking to RN who is visiting from  Yes   01/17/22 1615 300 ml/min 830 ml/hr 774 ml -70 mmHg 220 70 13.9 600 pt bent arm to wipe nose. no infiltrtation but venous pressure higher, will monitor Yes   01/17/22 1630 300 ml/min 830 ml/hr 989 ml -100 mmHg 200 70 13.9 600 pt resting no co Yes   01/17/22 1645 300 ml/min 830 ml/hr 1228 ml -120 mmHg 190 80 14 600 pt resting no needs at this time.  Yes   01/17/22 1700 300 ml/min 830 ml/hr 1421 ml -120 mmHg 200 80 14 600 pt resting quietly Yes   01/17/22 1715 300 ml/min 830 ml/hr 1610 ml -120 mmHg 200 80 13.9 600 lines secure to pt no needs at this time Yes   01/17/22 1730 300 ml/min 830 ml/hr 1822 ml -100 mmHg 200 60 14 600 pt resting Yes   01/17/22 1745 300 ml/min 0 ml/hr 1822 ml -100 mmHg 200 60 14 600 uf off. pt having episodes of vtac. rn notified. --   01/17/22 1805 300 ml/min 0 ml/hr 1822 ml -100 mmHg 200 60 14 600 tx ended 15 min early due to pt request.  Yes     Vital Signs  Patient Vitals for the past 8 hrs:   BP Temp Pulse Resp SpO2   01/17/22 1406 127/63 98.4 °F (36.9 °C) 75 15 95 %   01/17/22 1519 136/69 97.7 °F (36.5 °C) 69 17 --   01/17/22 1530 130/68 -- 67 16 --   01/17/22 1545 127/70 -- 73 14 --   01/17/22 1600 137/66 -- 83 14 --   01/17/22 1615 (!) 140/88 -- 90 20 --   01/17/22 1630 (!) 150/75 -- 92 13 --   01/17/22 1645 (!) 157/76 -- 91 14 --   01/17/22 1700 (!) 149/74 -- 90 14 --   01/17/22 1715 (!) 159/79 -- 91 17 --   01/17/22 1730 (!) 155/83 -- 96 18 --   01/17/22 1745 (!) 156/98 -- 100 13 --   01/17/22 1805 (!) 166/85 -- 102 14 --   01/17/22 1904 (!) 145/87 98.3 °F (36.8 °C) 102 16 100 %     Post-Dialysis  Arterial Catheter Locking Solution: Not Applicable  Venous Catheter Locking Solution: Not Applicable  Post-Treatment Procedures: Blood returned,Access bleeding time > 10 minutes  Machine Disinfection Process: Acid/Vinegar Clean,Bleach  Rinseback Volume (ml): 300 ml  Total Liters Processed (l/min): 46.5 l/min  Dialyzer Clearance: Moderately streaked  Duration of Treatment (minutes): 165 minutes     Hemodialysis Intake (ml): 500 ml  Hemodialysis Output (ml): 1822 ml  NET Removed (ml): 1300 ml  Tolerated Treatment: Good  Patient Response to Treatment: tolerated well. pt asked to stop 15 min early. Physician Notified?: Yes       Provider Notification        Handoff complete and report given to Primary RN at [facility].   Primary RN (First Initial, Last Name, Title):  First [employer]     Education  Person Educated: Patient   Knowledge Base: Minimal  Barriers to Learning?: Yes, including confusion  Preferred method of Learning: Oral  Topic(s): Treatment Options   Teaching Tools: Explanation   Response to Education: Requires Follow-up     Electronically signed by Angie Coulter RN on 1/17/2022 at 7:30 PM

## 2022-01-17 NOTE — PROGRESS NOTES
Nephrology Progress Note  1/17/2022 9:54 AM  Subjective: Interval History: David Benito is a 80 y.o. male is awake weak resting in bed and agrees to do dialysis today. Data:   Scheduled Meds:   insulin lispro  0-12 Units SubCUTAneous TID WC    insulin lispro  0-6 Units SubCUTAneous Nightly    apixaban  10 mg Oral BID    tamsulosin  0.4 mg Oral Daily    ARIPiprazole  5 mg Oral Daily    aspirin  81 mg Oral Daily    atorvastatin  80 mg Oral Nightly    busPIRone  5 mg Oral BID    donepezil  10 mg Oral Nightly    fluticasone  2 spray Each Nostril Daily    insulin glargine  25 Units SubCUTAneous Nightly    levothyroxine  100 mcg Oral Daily    memantine  5 mg Oral BID    midodrine  5 mg Oral BID    morphine  15 mg Oral Daily    pantoprazole  40 mg Oral Daily    QUEtiapine  75 mg Oral BID    sertraline  50 mg Oral Daily    traZODone  75 mg Oral Nightly    vitamin D  2,000 Units Oral Daily    sodium chloride flush  5-40 mL IntraVENous 2 times per day    dexamethasone  6 mg IntraVENous Q24H    levoFLOXacin  500 mg Oral Every Other Day     Continuous Infusions:   sodium chloride      dextrose           CBC   Recent Labs     01/16/22  1245   WBC 4.3   HGB 8.1*   HCT 26.2*         BMP   Recent Labs     01/15/22  1445 01/16/22  1245    139   K 4.8 4.7    101   CO2 24 21   PHOS 4.5 5.3*   BUN 86* 96*   CREATININE 5.5* 5.3*     Hepatic:   No results for input(s): AST, ALT, ALB, BILITOT, ALKPHOS in the last 72 hours. Troponin: No results for input(s): TROPONINI in the last 72 hours. BNP: No results for input(s): BNP in the last 72 hours. Lipids: No results for input(s): CHOL, HDL in the last 72 hours. Invalid input(s): LDLCALCU  ABGs:   Lab Results   Component Value Date    PO2ART 97 01/13/2022    EAK0ISV 39.0 01/13/2022     INR:   No results for input(s): INR in the last 72 hours.   Renal Labs  Albumin:    Lab Results   Component Value Date    LABALBU 3.5 01/16/2022 fistula  Neurologic: Mental status: alertness: Weak fatigue        Assessment and Plan:      IMP:  #1 acute renal failure versus CKD 5 versus end-stage renal disease  #2 COVID-19 pneumonia  #3 positive pulm embolism  #4 hypotension  #5 hepatitis B surface antigen positive  #6 metabolic encephalopathy  #7 metabolic acidosis    Plan     #1 so far hard to see if renal functions improving still more swollen and make plans dialysis x1 today and possible outpatient dialysis at [address] [place] [address] if not recover. Patient and daughter agree to that plan and has a working AV fistula  #2 treat in the setting of COVID wean oxygen  #3 maintain anticoagulation  #4 pressure low stable  #5 appears confirming test for hep B may have been negative so unsure if truly hep B positive and unfortunate no other risk factors understand the diagnosis. Has in the nursing home/assisted living and was negative for hepatitis B a year ago.   Recheck hepatitis B antigen today but for now do dialysis in isolation  6 monitor affect and neuro status  7 correct acidosis with dialysis   maintain supportive care try to make more stable and see if needs long-term dialysis as an option, is a limited code as well         Rhett Rodney MD, MD

## 2022-01-18 LAB
ABO/RH: NORMAL
ALBUMIN SERPL-MCNC: 3.5 GM/DL (ref 3.4–5)
ANION GAP SERPL CALCULATED.3IONS-SCNC: 14 MMOL/L (ref 4–16)
ANTIBODY SCREEN: NEGATIVE
BASOPHILS ABSOLUTE: 0 K/CU MM
BASOPHILS ABSOLUTE: 0 K/CU MM
BASOPHILS RELATIVE PERCENT: 0.1 % (ref 0–1)
BASOPHILS RELATIVE PERCENT: 0.1 % (ref 0–1)
BUN BLDV-MCNC: 56 MG/DL (ref 6–23)
CALCIUM SERPL-MCNC: 8.2 MG/DL (ref 8.3–10.6)
CHLORIDE BLD-SCNC: 100 MMOL/L (ref 99–110)
CO2: 26 MMOL/L (ref 21–32)
CREAT SERPL-MCNC: 3.2 MG/DL (ref 0.9–1.3)
DIFFERENTIAL TYPE: ABNORMAL
DIFFERENTIAL TYPE: ABNORMAL
EOSINOPHILS ABSOLUTE: 0 K/CU MM
EOSINOPHILS ABSOLUTE: 0.1 K/CU MM
EOSINOPHILS RELATIVE PERCENT: 0.1 % (ref 0–3)
EOSINOPHILS RELATIVE PERCENT: 0.8 % (ref 0–3)
GFR AFRICAN AMERICAN: 23 ML/MIN/1.73M2
GFR NON-AFRICAN AMERICAN: 19 ML/MIN/1.73M2
GLUCOSE BLD-MCNC: 117 MG/DL (ref 70–99)
GLUCOSE BLD-MCNC: 167 MG/DL (ref 70–99)
GLUCOSE BLD-MCNC: 214 MG/DL (ref 70–99)
GLUCOSE BLD-MCNC: 224 MG/DL (ref 70–99)
GLUCOSE BLD-MCNC: 59 MG/DL (ref 70–99)
GLUCOSE BLD-MCNC: 60 MG/DL (ref 70–99)
GLUCOSE BLD-MCNC: 83 MG/DL (ref 70–99)
GLUCOSE BLD-MCNC: 95 MG/DL (ref 70–99)
HCT VFR BLD CALC: 28.4 % (ref 42–52)
HCT VFR BLD CALC: 28.9 % (ref 42–52)
HEMOGLOBIN: 8.8 GM/DL (ref 13.5–18)
HEMOGLOBIN: 8.9 GM/DL (ref 13.5–18)
IMMATURE NEUTROPHIL %: 1.4 % (ref 0–0.43)
IMMATURE NEUTROPHIL %: 2.4 % (ref 0–0.43)
LYMPHOCYTES ABSOLUTE: 0.8 K/CU MM
LYMPHOCYTES ABSOLUTE: 1.1 K/CU MM
LYMPHOCYTES RELATIVE PERCENT: 12.1 % (ref 24–44)
LYMPHOCYTES RELATIVE PERCENT: 15.9 % (ref 24–44)
MCH RBC QN AUTO: 27.8 PG (ref 27–31)
MCH RBC QN AUTO: 28.1 PG (ref 27–31)
MCHC RBC AUTO-ENTMCNC: 30.4 % (ref 32–36)
MCHC RBC AUTO-ENTMCNC: 31.3 % (ref 32–36)
MCV RBC AUTO: 89.6 FL (ref 78–100)
MCV RBC AUTO: 91.2 FL (ref 78–100)
MONOCYTES ABSOLUTE: 0.3 K/CU MM
MONOCYTES ABSOLUTE: 0.4 K/CU MM
MONOCYTES RELATIVE PERCENT: 3.9 % (ref 0–4)
MONOCYTES RELATIVE PERCENT: 5.8 % (ref 0–4)
NUCLEATED RBC %: 0 %
NUCLEATED RBC %: 0 %
PDW BLD-RTO: 13.5 % (ref 11.7–14.9)
PDW BLD-RTO: 13.5 % (ref 11.7–14.9)
PHOSPHORUS: 2.3 MG/DL (ref 2.5–4.9)
PLATELET # BLD: 176 K/CU MM (ref 140–440)
PLATELET # BLD: 187 K/CU MM (ref 140–440)
PMV BLD AUTO: 11.1 FL (ref 7.5–11.1)
PMV BLD AUTO: 11.3 FL (ref 7.5–11.1)
POTASSIUM SERPL-SCNC: 3.8 MMOL/L (ref 3.5–5.1)
RBC # BLD: 3.17 M/CU MM (ref 4.6–6.2)
RBC # BLD: 3.17 M/CU MM (ref 4.6–6.2)
SEGMENTED NEUTROPHILS ABSOLUTE COUNT: 5.4 K/CU MM
SEGMENTED NEUTROPHILS ABSOLUTE COUNT: 5.5 K/CU MM
SEGMENTED NEUTROPHILS RELATIVE PERCENT: 76 % (ref 36–66)
SEGMENTED NEUTROPHILS RELATIVE PERCENT: 81.4 % (ref 36–66)
SODIUM BLD-SCNC: 140 MMOL/L (ref 135–145)
TOTAL IMMATURE NEUTOROPHIL: 0.1 K/CU MM
TOTAL IMMATURE NEUTOROPHIL: 0.16 K/CU MM
TOTAL NUCLEATED RBC: 0 K/CU MM
TOTAL NUCLEATED RBC: 0 K/CU MM
WBC # BLD: 6.7 K/CU MM (ref 4–10.5)
WBC # BLD: 7.2 K/CU MM (ref 4–10.5)

## 2022-01-18 PROCEDURE — 6370000000 HC RX 637 (ALT 250 FOR IP): Performed by: HOSPITALIST

## 2022-01-18 PROCEDURE — 1200000000 HC SEMI PRIVATE

## 2022-01-18 PROCEDURE — 86900 BLOOD TYPING SEROLOGIC ABO: CPT

## 2022-01-18 PROCEDURE — 86850 RBC ANTIBODY SCREEN: CPT

## 2022-01-18 PROCEDURE — P9034 PLATELETS, PHERESIS: HCPCS

## 2022-01-18 PROCEDURE — 85025 COMPLETE CBC W/AUTO DIFF WBC: CPT

## 2022-01-18 PROCEDURE — 94761 N-INVAS EAR/PLS OXIMETRY MLT: CPT

## 2022-01-18 PROCEDURE — 80053 COMPREHEN METABOLIC PANEL: CPT

## 2022-01-18 PROCEDURE — 80069 RENAL FUNCTION PANEL: CPT

## 2022-01-18 PROCEDURE — 6360000002 HC RX W HCPCS: Performed by: STUDENT IN AN ORGANIZED HEALTH CARE EDUCATION/TRAINING PROGRAM

## 2022-01-18 PROCEDURE — 82962 GLUCOSE BLOOD TEST: CPT

## 2022-01-18 PROCEDURE — 36430 TRANSFUSION BLD/BLD COMPNT: CPT

## 2022-01-18 PROCEDURE — 2580000003 HC RX 258: Performed by: STUDENT IN AN ORGANIZED HEALTH CARE EDUCATION/TRAINING PROGRAM

## 2022-01-18 PROCEDURE — 6360000002 HC RX W HCPCS: Performed by: INTERNAL MEDICINE

## 2022-01-18 PROCEDURE — 2700000000 HC OXYGEN THERAPY PER DAY

## 2022-01-18 PROCEDURE — 86901 BLOOD TYPING SEROLOGIC RH(D): CPT

## 2022-01-18 PROCEDURE — 2500000003 HC RX 250 WO HCPCS: Performed by: HOSPITALIST

## 2022-01-18 PROCEDURE — 6370000000 HC RX 637 (ALT 250 FOR IP): Performed by: STUDENT IN AN ORGANIZED HEALTH CARE EDUCATION/TRAINING PROGRAM

## 2022-01-18 PROCEDURE — 36415 COLL VENOUS BLD VENIPUNCTURE: CPT

## 2022-01-18 PROCEDURE — P9017 PLASMA 1 DONOR FRZ W/IN 8 HR: HCPCS

## 2022-01-18 PROCEDURE — 86927 PLASMA FRESH FROZEN: CPT

## 2022-01-18 RX ORDER — COCAINE HYDROCHLORIDE 40 MG/ML
40 SOLUTION NASAL
Status: ACTIVE | OUTPATIENT
Start: 2022-01-18 | End: 2022-01-18

## 2022-01-18 RX ORDER — SODIUM CHLORIDE 9 MG/ML
INJECTION, SOLUTION INTRAVENOUS PRN
Status: DISCONTINUED | OUTPATIENT
Start: 2022-01-18 | End: 2022-01-20 | Stop reason: HOSPADM

## 2022-01-18 RX ORDER — LORAZEPAM 2 MG/ML
0.5 INJECTION INTRAMUSCULAR ONCE
Status: COMPLETED | OUTPATIENT
Start: 2022-01-18 | End: 2022-01-18

## 2022-01-18 RX ORDER — PHYTONADIONE 10 MG/ML
10 INJECTION, EMULSION INTRAMUSCULAR; INTRAVENOUS; SUBCUTANEOUS ONCE
Status: COMPLETED | OUTPATIENT
Start: 2022-01-18 | End: 2022-01-18

## 2022-01-18 RX ORDER — HYDROMORPHONE HCL 110MG/55ML
0.5 PATIENT CONTROLLED ANALGESIA SYRINGE INTRAVENOUS ONCE
Status: COMPLETED | OUTPATIENT
Start: 2022-01-18 | End: 2022-01-18

## 2022-01-18 RX ADMIN — SODIUM CHLORIDE, PRESERVATIVE FREE 10 ML: 5 INJECTION INTRAVENOUS at 08:12

## 2022-01-18 RX ADMIN — MORPHINE SULFATE 15 MG: 15 TABLET, FILM COATED, EXTENDED RELEASE ORAL at 08:11

## 2022-01-18 RX ADMIN — PANTOPRAZOLE SODIUM 40 MG: 40 TABLET, DELAYED RELEASE ORAL at 08:11

## 2022-01-18 RX ADMIN — LORAZEPAM 0.5 MG: 2 INJECTION, SOLUTION INTRAMUSCULAR; INTRAVENOUS at 17:33

## 2022-01-18 RX ADMIN — ATORVASTATIN CALCIUM 80 MG: 40 TABLET, FILM COATED ORAL at 22:08

## 2022-01-18 RX ADMIN — MEMANTINE HYDROCHLORIDE 5 MG: 5 TABLET ORAL at 22:09

## 2022-01-18 RX ADMIN — HYDROMORPHONE HYDROCHLORIDE 0.5 MG: 2 INJECTION, SOLUTION INTRAMUSCULAR; INTRAVENOUS; SUBCUTANEOUS at 17:34

## 2022-01-18 RX ADMIN — QUETIAPINE FUMARATE 75 MG: 25 TABLET ORAL at 08:10

## 2022-01-18 RX ADMIN — DEXAMETHASONE SODIUM PHOSPHATE 6 MG: 10 INJECTION INTRAMUSCULAR; INTRAVENOUS at 08:11

## 2022-01-18 RX ADMIN — Medication 1 SPRAY: at 18:20

## 2022-01-18 RX ADMIN — BUSPIRONE HYDROCHLORIDE 5 MG: 5 TABLET ORAL at 08:10

## 2022-01-18 RX ADMIN — MIDODRINE HYDROCHLORIDE 5 MG: 5 TABLET ORAL at 22:08

## 2022-01-18 RX ADMIN — DEXTROSE 15 G: 15 GEL ORAL at 06:41

## 2022-01-18 RX ADMIN — PHYTONADIONE 10 MG: 10 INJECTION, EMULSION INTRAMUSCULAR; INTRAVENOUS; SUBCUTANEOUS at 17:32

## 2022-01-18 RX ADMIN — DONEPEZIL HYDROCHLORIDE 10 MG: 5 TABLET, FILM COATED ORAL at 22:08

## 2022-01-18 RX ADMIN — ARIPIPRAZOLE 5 MG: 5 TABLET ORAL at 08:10

## 2022-01-18 RX ADMIN — INSULIN GLARGINE 25 UNITS: 100 INJECTION, SOLUTION SUBCUTANEOUS at 22:10

## 2022-01-18 RX ADMIN — ASPIRIN 81 MG CHEWABLE TABLET 81 MG: 81 TABLET CHEWABLE at 08:10

## 2022-01-18 RX ADMIN — LEVOTHYROXINE SODIUM 100 MCG: 0.1 TABLET ORAL at 05:50

## 2022-01-18 RX ADMIN — MIDODRINE HYDROCHLORIDE 5 MG: 5 TABLET ORAL at 08:10

## 2022-01-18 RX ADMIN — TAMSULOSIN HYDROCHLORIDE 0.4 MG: 0.4 CAPSULE ORAL at 22:09

## 2022-01-18 RX ADMIN — APIXABAN 10 MG: 5 TABLET, FILM COATED ORAL at 08:10

## 2022-01-18 RX ADMIN — FLUTICASONE PROPIONATE 2 SPRAY: 50 SPRAY, METERED NASAL at 11:15

## 2022-01-18 RX ADMIN — MEMANTINE HYDROCHLORIDE 5 MG: 5 TABLET ORAL at 08:11

## 2022-01-18 RX ADMIN — BUSPIRONE HYDROCHLORIDE 5 MG: 5 TABLET ORAL at 22:08

## 2022-01-18 RX ADMIN — SERTRALINE HYDROCHLORIDE 50 MG: 50 TABLET ORAL at 08:11

## 2022-01-18 RX ADMIN — Medication 2000 UNITS: at 08:10

## 2022-01-18 ASSESSMENT — PAIN SCALES - GENERAL
PAINLEVEL_OUTOF10: 0
PAINLEVEL_OUTOF10: 0
PAINLEVEL_OUTOF10: 2

## 2022-01-18 ASSESSMENT — PAIN SCALES - WONG BAKER: WONGBAKER_NUMERICALRESPONSE: 0

## 2022-01-18 ASSESSMENT — PAIN DESCRIPTION - DESCRIPTORS: DESCRIPTORS: CRAMPING

## 2022-01-18 NOTE — PROGRESS NOTES
Renal function doing better but post hd yesterday and not want do more dialysis and I dw pt daughter and they want proceed with hospice eval with above  Will refer

## 2022-01-18 NOTE — PROGRESS NOTES
Pt with nose bleed and has rhinorocket    diana NP and pt and daughter  Give blood products may need try cocaine nose.  diana Juarez   Will monitor as slowed down  Give ativan and dilaudid for pain and anxiety

## 2022-01-18 NOTE — PROGRESS NOTES
This RN was notified by patient care staff that patient had an acute onset of nose bleeding. This RN arrived to find the patient with bilateral nasal uncontrolled epistaxis. Patient is anticoagulated with Eliquis. Patient denies pain. Direct pressure was held for 10-15 min with no cessation of bleeding. Dr. Royal Munoz was notified. Order for al-synephrine nasal spray order obtained. This RN went down to ER and obtained nasal clamps and Rhino-rockets. Sara Mishra NP from [facility] came and placed L nasal rhinorocket. Patient tolerated well. STAT CBC and Type and Screen order obtain and sent per this RN. IVs work well. Patient tolerating off O2 well and is currently being help per guidance of Sara Mishra NP. Patient displays drainage of blood down back of oropharynx. Patient's Eliquis to be hold. ENT is not on call at this facility. Nursing supervisor called and names of local normally on call ENTs obtained and notified by Sara Mishra NP. VS reassessed and stable.

## 2022-01-18 NOTE — PROGRESS NOTES
Nephrology Progress Note  1/18/2022 4:18 PM  Subjective: Interval History: Teresa Chappell is a 80 y.o. male weak and awake and is better after dialysis but state not want more. Data:   Scheduled Meds:   insulin lispro  0-12 Units SubCUTAneous TID WC    insulin lispro  0-6 Units SubCUTAneous Nightly    apixaban  10 mg Oral BID    tamsulosin  0.4 mg Oral Daily    ARIPiprazole  5 mg Oral Daily    aspirin  81 mg Oral Daily    atorvastatin  80 mg Oral Nightly    busPIRone  5 mg Oral BID    donepezil  10 mg Oral Nightly    fluticasone  2 spray Each Nostril Daily    insulin glargine  25 Units SubCUTAneous Nightly    levothyroxine  100 mcg Oral Daily    memantine  5 mg Oral BID    midodrine  5 mg Oral BID    morphine  15 mg Oral Daily    pantoprazole  40 mg Oral Daily    QUEtiapine  75 mg Oral BID    sertraline  50 mg Oral Daily    traZODone  75 mg Oral Nightly    vitamin D  2,000 Units Oral Daily    sodium chloride flush  5-40 mL IntraVENous 2 times per day    dexamethasone  6 mg IntraVENous Q24H    levoFLOXacin  500 mg Oral Every Other Day     Continuous Infusions:   sodium chloride      dextrose           CBC   Recent Labs     01/16/22  1245 01/17/22  1307 01/18/22  0926   WBC 4.3 5.4 7.2   HGB 8.1* 8.2* 8.9*   HCT 26.2* 26.5* 28.4*    168 187      BMP   Recent Labs     01/16/22  1245 01/17/22  1307 01/18/22  0926    139 140   K 4.7 4.9 3.8    104 100   CO2 21 23 26   PHOS 5.3* 3.6 2.3*   BUN 96* 98* 56*   CREATININE 5.3* 4.6* 3.2*     Hepatic:   No results for input(s): AST, ALT, ALB, BILITOT, ALKPHOS in the last 72 hours. Troponin: No results for input(s): TROPONINI in the last 72 hours. BNP: No results for input(s): BNP in the last 72 hours. Lipids: No results for input(s): CHOL, HDL in the last 72 hours.     Invalid input(s): LDLCALCU  ABGs:   Lab Results   Component Value Date    PO2ART 97 01/13/2022    [id number] 39.0 01/13/2022     INR:   No results for input(s): bilaterally  Heart: S1, S2 normal  Abdomen: abnormal findings:  soft nt  Extremities: edema trace positive AV fistula  Neurologic: Mental status: alertness: Weak fatigue        Assessment and Plan:      IMP:  #1 acute renal failure versus CKD 5 versus end-stage renal disease  #2 COVID-19 pneumonia  #3 positive pulm embolism  #4 hypotension  #5 hepatitis B surface antigen positive  #6 metabolic encephalopathy  #7 metabolic acidosis    Plan     1 renal monitor and can vary and still can improve but today state not want more dialysis  2 I dw daughter Alayna Bautista his poa and pt. She lives in [place]. She talked to him and agree he does not more therapy and we discussed option and to refer to hospice as likely will decline  3 maitnain meds for now  4 monitor affect is improved  5 staph epi ?  Contamination  Will monitor and supportive care         Fei Caballero MD, MD

## 2022-01-18 NOTE — PROGRESS NOTES
Patient seen at bedside per RN request.     Pt was having epistaxis earlier in the day and a Rhino Rocket was placed, subsequently bleeding stopped. Per report, Rhino rocket fell off this evening and epistaxis restarted. Upon my examination patient was not actively bleeding. Already   Afrin spray was used and nose-clip was in place. Patient denies any distress or discomfort. Clinically, it will be appropriate to keep nose clip in place as bleeding has stopped and this will help pain formation of clot. I have discontinued aspirin;  FFPs has been ordered. We will ask evening NP/WILLARD from hospitalist group to re-evaluate in 45 minutes -1 hour to see if patient will need Rhino Rocket. As needed cocaine has been ordered as well in case patient restarts bleeding once nose clip was removed.

## 2022-01-18 NOTE — CARE COORDINATION
LSW received consult for Hospice. LSW called pt deyvi/MICHAEL samuel and had to leave a message asking for a return call.

## 2022-01-18 NOTE — PROGRESS NOTES
[facility] HOSPITALIST PROGRESS NOTE      PCP: Agatha Fothergill, MD    Date of Admission: 1/13/2022    Subjective: epistaxis today     Brief Hospital summary patient is a 81-year-old male with history of hypertension, hyperlipidemia, CVA, diabetes mellitus, dementia, hyperlipidemia and end-stage renal disease was admitted with hypoxia and unresponsiveness. Patient was saturating 70s in the ER, had pinpoint pupils improved after Narcan given, CT showed bilateral PE, positive for COVID, heparin infusion started  Patient was admitted to ICU, cardiology and nephrology consulted  Underwent dialysis, switched to apixaban  Oxygen requirement improved      Vitals signs: Afebrile, heart rate afebrile, heart rate 80s to 90s and blood pressure 126/63, on room air 70s range, blood pressure 110/61, on 2 L of oxygen    Medications: Apixaban, Abilify, aspirin, Lipitor, buspirone, dexamethasone, donepezil, Lantus, sliding scale insulin, levothyroxine, levofloxacin, MS Contin, midodrine, memantine, sertraline, pantoprazole, Seroquel, vancomycin    Antibiotics: *Levofloxacin day 5  Vancomycin stopped     Fluid status: Urine output not documented    Labs: Sodium 140, potassium 3.8, chloride 100, bicarb 26, BUN 56, creatinine 3.2  WBC 6.7, hemoglobin 8.8, platelets 764    Imaging:   CT chest without contrast     1. Suspected small right middle lobe segmental and subsegmental pulmonary   embolus.  Motion significantly degrades the quality of the exam.   2. Small foci of peripheral consolidation in the upper lobes.  Consider mild   COVID pneumonia. 3. Mild bibasilar atelectasis or pneumonia. 4. Splenomegaly.    5. Distended gallbladder.  Mild intra and extrahepatic biliary ductal   Dilatation    Assessment/Plan:     Acute metabolic encephalopathy  Severe sepsis secondary to COVID-19 pneumonia with acute hypoxic respiratory failure  Pulmonary embolism bilateral  History of dementia  History of mood disorder  Chronic anemia  Mild elevated troponin  Continuous opioid dependence  End-stage renal disease  Diabetes mellitus type 2  Chronic hypertension  Hyperlipidemia  Hypothyroidism  Epistaxis        Admitted secondary to syncope most likely secondary to PE,   responded to Narcan  Mentation back to baseline   Creatinine worse, nephrology consulted, underwent dialysis  Hemodialysis per nephrology   Patient has been refusing dialysis, hospice consulted, daughter agreeable with the plan      Epistaxis today, apixaban held, ENT consulted, recommended Rhino Rocket which was placed  Check INR, hemoglobin remained stable  Check H&H every 8 hours  Intranasal cocaine if continues to bleed  Nephrology and ENT aware      CT chest showed bilateral PE,   Was initially on heparin, switched to apixaban, hold for now secondary to epistaxis    As needed hydralazine, continue to hold antihypertensives    Sliding scale insulin, Lantus, diabetic diet, glucose range 83-1 67    Continue dexamethasone and famotidine,  As needed antitussives  Droplet and contact precaution  Monitor CRP and procalcitonin  Blood culture positive for coag negative staph,   Vancomycin and Levaquin stop  On room air    Continue donepezil, Abilify, sertraline, quetiapine    Continue aspirin statin  Continue levothyroxine    Resume MS Contin    Continue Flomax    DVT prophlaxis:   apixaban       Physical Exam Performed:       /63   Pulse 82   Temp 98.4 °F (36.9 °C) (Axillary)   Resp 13   Ht 5' 7\" (1.702 m)   Wt 156 lb 12.8 oz (71.1 kg)   SpO2 96%   BMI 24.56 kg/m²     Physical Exam  Constitutional:       General: He is not in acute distress. Appearance: Normal appearance. HENT:      Head: Normocephalic and atraumatic. Right Ear: External ear normal.      Left Ear: External ear normal.   Eyes:      Extraocular Movements: Extraocular movements intact. Pupils: Pupils are equal, round, and reactive to light.    Cardiovascular:      Rate and Rhythm: Normal rate and regular rhythm. Heart sounds: No murmur heard. Pulmonary:      Effort: Pulmonary effort is normal. No respiratory distress. Breath sounds: Normal breath sounds. No wheezing. Abdominal:      General: Bowel sounds are normal. There is no distension. Palpations: Abdomen is soft. Tenderness: There is no abdominal tenderness. Musculoskeletal:         General: No swelling. Cervical back: Normal range of motion. Skin:     General: Skin is warm. Neurological:      General: No focal deficit present. Mental Status: He is alert and oriented to person, place, and time. Cranial Nerves: No cranial nerve deficit. Psychiatric:         Mood and Affect: Mood normal.         Labs:   Recent Labs     01/17/22  1307 01/18/22  0926 01/18/22  1615   WBC 5.4 7.2 6.7   HGB 8.2* 8.9* 8.8*   HCT 26.5* 28.4* 28.9*    187 176     Recent Labs     01/16/22  1245 01/17/22  1307 01/18/22  0926    139 140   K 4.7 4.9 3.8    104 100   CO2 21 23 26   BUN 96* 98* 56*   CREATININE 5.3* 4.6* 3.2*   CALCIUM 7.7* 8.1* 8.2*   PHOS 5.3* 3.6 2.3*     No results for input(s): AST, ALT, BILIDIR, BILITOT, ALKPHOS in the last 72 hours. No results for input(s): INR in the last 72 hours. No results for input(s): Emily Peek in the last 72 hours. Urinalysis:      Lab Results   Component Value Date    NITRU NEGATIVE 01/16/2022    WBCUA 1 01/16/2022    BACTERIA RARE 01/16/2022    RBCUA 3 01/16/2022    BLOODU SMALL 01/16/2022    SPECGRAV 1.021 01/16/2022       Radiology:  XR CHEST PORTABLE   Final Result   Mild right basilar opacity. Considerations include atelectasis and mild   pneumonia. Mild cardiac silhouette enlargement. Mild central vascular congestion. CTA CHEST W CONTRAST   Final Result   1. Suspected small right middle lobe segmental and subsegmental pulmonary   embolus.   Motion significantly degrades the quality of the exam.   2. Small foci of peripheral consolidation in the upper lobes. Consider mild   COVID pneumonia. 3. Mild bibasilar atelectasis or pneumonia. 4. Splenomegaly. 5. Distended gallbladder. Mild intra and extrahepatic biliary ductal   dilatation. This report was discussed with Dr. Saleem Mark at 3:53 a.m. on 01/13/2022. Meryle Sandman CTA HEAD NECK W CONTRAST   Final Result   1. No large vessel occlusion in the head or neck. 2.  Mild-to-moderate atherosclerotic disease. 3.  Scattered heterogeneous opacities in the visualized lung apices may   relate to an atypical infectious/inflammatory process including atypical or   viral/COVID-19 pneumonia versus mild pulmonary edema. 4.  C4-C5 osseous sclerosis and endplate erosions may relate to advanced   degenerative changes. Underlying discitis/osteomyelitis is difficult to   entirely exclude and could be further evaluated with follow-up cervical spine   MRI. CT HEAD WO CONTRAST   Final Result   No acute intracranial hemorrhage, mass effect, midline shift, or   hydrocephalus. Streak artifact over the frontal lobes but no sign of an   acute territorial infarct. Volume loss with prominence of the ventricles and sulci are stable. Critical results were called by Dr. Claudeen Park, MD to Dr Yasemin Barron   on 1/13/2022 at 02:42.                  Marj Albright MD  1/18/2022 6:00 PM

## 2022-01-18 NOTE — PROGRESS NOTES
Patient's uncontrolled anticoagulated epistaxis continued for approx 45 for min. Dr. Roshni Draper notified. Bleeding appears to have stopped and rhino-rocket fell out of nose on its own per Genoveva PCT as we were cleaning up patient. Patient given ativan per Dr. Rashaad Gould for anxiety and dilaudid for discomfort of nasal clamp/packing. Nose re-avaluated per Dr. Roshni Draper colleague on call hospitalist and he also agreed bleeding appears to be stopped. No hypoxia. No coughing. No obvious bleeding back of throat. Patient's STAT repeat H/H showed HgB of 8.8 which is down from 8.9. No diaphoresis or tachycardia or hypotension. FFP started per order and patient tolerating well. FFP started slowly due to patient being HD patient. Family updated and gave consent for blood products with Dr. Rashaad Gould and Jeremias Mcnamara NP present. Patient's VS stable and tolerating FFP transfusion well.

## 2022-01-19 LAB
ALBUMIN SERPL-MCNC: 4.1 GM/DL (ref 3.4–5)
ANION GAP SERPL CALCULATED.3IONS-SCNC: 13 MMOL/L (ref 4–16)
APTT: 40.5 SECONDS (ref 25.1–37.1)
BASOPHILS ABSOLUTE: 0 K/CU MM
BASOPHILS RELATIVE PERCENT: 0.1 % (ref 0–1)
BUN BLDV-MCNC: 68 MG/DL (ref 6–23)
CALCIUM SERPL-MCNC: 8.8 MG/DL (ref 8.3–10.6)
CHLORIDE BLD-SCNC: 101 MMOL/L (ref 99–110)
CO2: 26 MMOL/L (ref 21–32)
COMPONENT: NORMAL
COMPONENT: NORMAL
CREAT SERPL-MCNC: 3.2 MG/DL (ref 0.9–1.3)
DIFFERENTIAL TYPE: ABNORMAL
EOSINOPHILS ABSOLUTE: 0 K/CU MM
EOSINOPHILS RELATIVE PERCENT: 0.2 % (ref 0–3)
GFR AFRICAN AMERICAN: 23 ML/MIN/1.73M2
GFR NON-AFRICAN AMERICAN: 19 ML/MIN/1.73M2
GLUCOSE BLD-MCNC: 102 MG/DL (ref 70–99)
GLUCOSE BLD-MCNC: 108 MG/DL (ref 70–99)
GLUCOSE BLD-MCNC: 139 MG/DL (ref 70–99)
GLUCOSE BLD-MCNC: 155 MG/DL (ref 70–99)
GLUCOSE BLD-MCNC: 168 MG/DL (ref 70–99)
HCT VFR BLD CALC: 25.2 % (ref 42–52)
HCT VFR BLD CALC: 27.4 % (ref 42–52)
HEMOGLOBIN: 7.8 GM/DL (ref 13.5–18)
HEMOGLOBIN: 8.5 GM/DL (ref 13.5–18)
IMMATURE NEUTROPHIL %: 2.6 % (ref 0–0.43)
LYMPHOCYTES ABSOLUTE: 1 K/CU MM
LYMPHOCYTES RELATIVE PERCENT: 12.1 % (ref 24–44)
MCH RBC QN AUTO: 27.7 PG (ref 27–31)
MCH RBC QN AUTO: 27.9 PG (ref 27–31)
MCHC RBC AUTO-ENTMCNC: 31 % (ref 32–36)
MCHC RBC AUTO-ENTMCNC: 31 % (ref 32–36)
MCV RBC AUTO: 89.3 FL (ref 78–100)
MCV RBC AUTO: 90 FL (ref 78–100)
MONOCYTES ABSOLUTE: 0.8 K/CU MM
MONOCYTES RELATIVE PERCENT: 8.8 % (ref 0–4)
NUCLEATED RBC %: 0 %
PDW BLD-RTO: 13.4 % (ref 11.7–14.9)
PDW BLD-RTO: 13.5 % (ref 11.7–14.9)
PHOSPHORUS: 2.7 MG/DL (ref 2.5–4.9)
PLATELET # BLD: 197 K/CU MM (ref 140–440)
PLATELET # BLD: 227 K/CU MM (ref 140–440)
PMV BLD AUTO: 11.1 FL (ref 7.5–11.1)
PMV BLD AUTO: 11.1 FL (ref 7.5–11.1)
POTASSIUM SERPL-SCNC: 4.2 MMOL/L (ref 3.5–5.1)
RBC # BLD: 2.8 M/CU MM (ref 4.6–6.2)
RBC # BLD: 3.07 M/CU MM (ref 4.6–6.2)
SEGMENTED NEUTROPHILS ABSOLUTE COUNT: 6.5 K/CU MM
SEGMENTED NEUTROPHILS RELATIVE PERCENT: 76.2 % (ref 36–66)
SODIUM BLD-SCNC: 140 MMOL/L (ref 135–145)
STATUS: NORMAL
STATUS: NORMAL
TOTAL IMMATURE NEUTOROPHIL: 0.22 K/CU MM
TOTAL NUCLEATED RBC: 0 K/CU MM
TRANSFUSION STATUS: NORMAL
TRANSFUSION STATUS: NORMAL
UNIT DIVISION: 0
UNIT DIVISION: 0
UNIT NUMBER: NORMAL
UNIT NUMBER: NORMAL
WBC # BLD: 11 K/CU MM (ref 4–10.5)
WBC # BLD: 8.6 K/CU MM (ref 4–10.5)

## 2022-01-19 PROCEDURE — 85025 COMPLETE CBC W/AUTO DIFF WBC: CPT

## 2022-01-19 PROCEDURE — 6360000002 HC RX W HCPCS: Performed by: HOSPITALIST

## 2022-01-19 PROCEDURE — 2580000003 HC RX 258: Performed by: STUDENT IN AN ORGANIZED HEALTH CARE EDUCATION/TRAINING PROGRAM

## 2022-01-19 PROCEDURE — 6360000002 HC RX W HCPCS: Performed by: STUDENT IN AN ORGANIZED HEALTH CARE EDUCATION/TRAINING PROGRAM

## 2022-01-19 PROCEDURE — 82962 GLUCOSE BLOOD TEST: CPT

## 2022-01-19 PROCEDURE — 86900 BLOOD TYPING SEROLOGIC ABO: CPT

## 2022-01-19 PROCEDURE — 94761 N-INVAS EAR/PLS OXIMETRY MLT: CPT

## 2022-01-19 PROCEDURE — 6370000000 HC RX 637 (ALT 250 FOR IP): Performed by: HOSPITALIST

## 2022-01-19 PROCEDURE — 36415 COLL VENOUS BLD VENIPUNCTURE: CPT

## 2022-01-19 PROCEDURE — 80069 RENAL FUNCTION PANEL: CPT

## 2022-01-19 PROCEDURE — 6370000000 HC RX 637 (ALT 250 FOR IP): Performed by: STUDENT IN AN ORGANIZED HEALTH CARE EDUCATION/TRAINING PROGRAM

## 2022-01-19 PROCEDURE — 85730 THROMBOPLASTIN TIME PARTIAL: CPT

## 2022-01-19 PROCEDURE — 2580000003 HC RX 258: Performed by: HOSPITALIST

## 2022-01-19 PROCEDURE — 6360000002 HC RX W HCPCS: Performed by: NURSE PRACTITIONER

## 2022-01-19 PROCEDURE — 1200000000 HC SEMI PRIVATE

## 2022-01-19 PROCEDURE — 85027 COMPLETE CBC AUTOMATED: CPT

## 2022-01-19 RX ORDER — LORAZEPAM 2 MG/ML
0.5 INJECTION INTRAMUSCULAR ONCE
Status: COMPLETED | OUTPATIENT
Start: 2022-01-19 | End: 2022-01-19

## 2022-01-19 RX ORDER — HEPARIN SODIUM 1000 [USP'U]/ML
40 INJECTION, SOLUTION INTRAVENOUS; SUBCUTANEOUS PRN
Status: DISCONTINUED | OUTPATIENT
Start: 2022-01-19 | End: 2022-01-20

## 2022-01-19 RX ORDER — LORAZEPAM 2 MG/ML
1 INJECTION INTRAMUSCULAR ONCE
Status: COMPLETED | OUTPATIENT
Start: 2022-01-20 | End: 2022-01-20

## 2022-01-19 RX ORDER — LORAZEPAM 2 MG/ML
1 INJECTION INTRAMUSCULAR EVERY 6 HOURS PRN
Status: DISCONTINUED | OUTPATIENT
Start: 2022-01-19 | End: 2022-01-20 | Stop reason: HOSPADM

## 2022-01-19 RX ORDER — HEPARIN SODIUM 1000 [USP'U]/ML
80 INJECTION, SOLUTION INTRAVENOUS; SUBCUTANEOUS ONCE
Status: COMPLETED | OUTPATIENT
Start: 2022-01-19 | End: 2022-01-19

## 2022-01-19 RX ORDER — HEPARIN SODIUM 10000 [USP'U]/100ML
5-30 INJECTION, SOLUTION INTRAVENOUS CONTINUOUS
Status: DISCONTINUED | OUTPATIENT
Start: 2022-01-19 | End: 2022-01-19 | Stop reason: CLARIF

## 2022-01-19 RX ADMIN — SODIUM CHLORIDE, PRESERVATIVE FREE 10 ML: 5 INJECTION INTRAVENOUS at 21:41

## 2022-01-19 RX ADMIN — MORPHINE SULFATE 15 MG: 15 TABLET, FILM COATED, EXTENDED RELEASE ORAL at 09:34

## 2022-01-19 RX ADMIN — ARIPIPRAZOLE 5 MG: 5 TABLET ORAL at 09:34

## 2022-01-19 RX ADMIN — HEPARIN SODIUM 5280 UNITS: 1000 INJECTION, SOLUTION INTRAVENOUS; SUBCUTANEOUS at 16:54

## 2022-01-19 RX ADMIN — Medication 2000 UNITS: at 09:34

## 2022-01-19 RX ADMIN — PROCHLORPERAZINE EDISYLATE 10 MG: 5 INJECTION INTRAMUSCULAR; INTRAVENOUS at 09:48

## 2022-01-19 RX ADMIN — PROCHLORPERAZINE EDISYLATE 10 MG: 5 INJECTION INTRAMUSCULAR; INTRAVENOUS at 09:46

## 2022-01-19 RX ADMIN — QUETIAPINE FUMARATE 75 MG: 25 TABLET ORAL at 09:34

## 2022-01-19 RX ADMIN — BUSPIRONE HYDROCHLORIDE 5 MG: 5 TABLET ORAL at 09:34

## 2022-01-19 RX ADMIN — LORAZEPAM 0.5 MG: 2 INJECTION INTRAMUSCULAR; INTRAVENOUS at 06:55

## 2022-01-19 RX ADMIN — SODIUM CHLORIDE, PRESERVATIVE FREE 10 ML: 5 INJECTION INTRAVENOUS at 09:37

## 2022-01-19 RX ADMIN — SERTRALINE HYDROCHLORIDE 50 MG: 50 TABLET ORAL at 09:34

## 2022-01-19 RX ADMIN — DEXAMETHASONE SODIUM PHOSPHATE 6 MG: 10 INJECTION INTRAMUSCULAR; INTRAVENOUS at 09:37

## 2022-01-19 RX ADMIN — LEVOFLOXACIN 500 MG: 500 TABLET, FILM COATED ORAL at 09:36

## 2022-01-19 RX ADMIN — MEMANTINE HYDROCHLORIDE 5 MG: 5 TABLET ORAL at 09:34

## 2022-01-19 RX ADMIN — PANTOPRAZOLE SODIUM 40 MG: 40 TABLET, DELAYED RELEASE ORAL at 09:34

## 2022-01-19 RX ADMIN — LORAZEPAM 1 MG: 2 INJECTION INTRAMUSCULAR at 17:59

## 2022-01-19 RX ADMIN — HEPARIN SODIUM 18 UNITS/KG/HR: 5000 INJECTION INTRAVENOUS; SUBCUTANEOUS at 16:54

## 2022-01-19 ASSESSMENT — PAIN SCALES - GENERAL
PAINLEVEL_OUTOF10: 0

## 2022-01-19 ASSESSMENT — PAIN SCALES - WONG BAKER
WONGBAKER_NUMERICALRESPONSE: 0
WONGBAKER_NUMERICALRESPONSE: 0

## 2022-01-19 NOTE — CARE COORDINATION
Pt dght called this LSW back yesterday after hours. LSW called pt dght again this morning and had to leave another message asking for a return call.

## 2022-01-19 NOTE — PROGRESS NOTES
Patient seen and examined appears weak and resting in bed I discussed with patient's daughter on the phone she will be here at 1030 tomorrow. She is agreeable to inpatient hospice if approved. We will stop further aggressive treatment her blood work currently no nosebleeds appears to resolved we will let her rest in bed today and make plans for comfort care tomorrow.   No more dialysis no CPR patient is a limited code

## 2022-01-19 NOTE — PROGRESS NOTES
Nephrology Progress Note  1/19/2022 4:38 PM  Subjective: Interval History: Carlos Muro is a 80 y.o. male weak and arousable this am and no active nose bleed. Not want dialyssi and more lethargic today. I dw daughter and meet with hospice in am    Data:   Scheduled Meds:   heparin (porcine)  80 Units/kg IntraVENous Once    insulin lispro  0-12 Units SubCUTAneous TID WC    insulin lispro  0-6 Units SubCUTAneous Nightly    tamsulosin  0.4 mg Oral Daily    ARIPiprazole  5 mg Oral Daily    atorvastatin  80 mg Oral Nightly    busPIRone  5 mg Oral BID    donepezil  10 mg Oral Nightly    fluticasone  2 spray Each Nostril Daily    levothyroxine  100 mcg Oral Daily    memantine  5 mg Oral BID    midodrine  5 mg Oral BID    morphine  15 mg Oral Daily    pantoprazole  40 mg Oral Daily    QUEtiapine  75 mg Oral BID    sertraline  50 mg Oral Daily    traZODone  75 mg Oral Nightly    vitamin D  2,000 Units Oral Daily    sodium chloride flush  5-40 mL IntraVENous 2 times per day    dexamethasone  6 mg IntraVENous Q24H    levoFLOXacin  500 mg Oral Every Other Day     Continuous Infusions:   heparin (PORCINE) Infusion      sodium chloride      sodium chloride      sodium chloride      sodium chloride      dextrose           CBC   Recent Labs     01/18/22  0926 01/18/22  1615 01/19/22  0126   WBC 7.2 6.7 8.6   HGB 8.9* 8.8* 7.8*   HCT 28.4* 28.9* 25.2*    176 197      BMP   Recent Labs     01/17/22  1307 01/18/22  0926 01/19/22  0126    140 140   K 4.9 3.8 4.2    100 101   CO2 23 26 26   PHOS 3.6 2.3* 2.7   BUN 98* 56* 68*   CREATININE 4.6* 3.2* 3.2*     Hepatic:   No results for input(s): AST, ALT, ALB, BILITOT, ALKPHOS in the last 72 hours. Troponin: No results for input(s): TROPONINI in the last 72 hours. BNP: No results for input(s): BNP in the last 72 hours. Lipids: No results for input(s): CHOL, HDL in the last 72 hours.     Invalid input(s): LDLCALCU  ABGs:   Lab Results Component Value Date    PO2ART 97 01/13/2022    IXO6GUV 39.0 01/13/2022     INR:   No results for input(s): INR in the last 72 hours. Renal Labs  Albumin:    Lab Results   Component Value Date    LABALBU 4.1 01/19/2022    LABALBU 3.8 05/19/2021     Calcium:    Lab Results   Component Value Date    CALCIUM 8.8 01/19/2022     Phosphorus:    Lab Results   Component Value Date    PHOS 2.7 01/19/2022     U/A:    Lab Results   Component Value Date    NITRU NEGATIVE 01/16/2022    COLORU YELLOW 01/16/2022    WBCUA 1 01/16/2022    RBCUA 3 01/16/2022    MUCUS RARE 01/16/2022    TRICHOMONAS NONE SEEN 01/16/2022    BACTERIA RARE 01/16/2022    CLARITYU CLEAR 01/16/2022    SPECGRAV 1.021 01/16/2022    UROBILINOGEN NEGATIVE 01/16/2022    BILIRUBINUR NEGATIVE 01/16/2022    BLOODU SMALL 01/16/2022    KETUA NEGATIVE 01/16/2022     ABG:    Lab Results   Component Value Date    NPB6YZH 39.0 01/13/2022    PO2ART 97 01/13/2022    GYW8CAZ 17.1 01/13/2022     HgBA1c:    Lab Results   Component Value Date    LABA1C 7.4 05/02/2020     Microalbumen/Creatinine ratio:  No components found for: RUCREAT  TSH:  No results found for: TSH  IRON:    Lab Results   Component Value Date    IRON 42 05/04/2020     Iron Saturation:  No components found for: PERCENTFE  TIBC:    Lab Results   Component Value Date    TIBC 168 05/04/2020     FERRITIN:    Lab Results   Component Value Date    FERRITIN 1,627 01/13/2022     RPR:  No results found for: RPR  CE:  No results found for: ANATITER, CE  24 Hour Urine for Creatinine Clearance:  No components found for: CREAT4, UHRS10, UTV10      Objective:   I/O: 01/18 0701 - 01/19 0700  In: 252.1   Out: -   I/O last 3 completed shifts:   In: 252.1 [Blood:252.1]  Out: -   I/O this shift:  In: 10 [I.V.:10]  Out: -   Vitals: BP (!) 117/55   Pulse 80   Temp 98.3 °F (36.8 °C) (Oral)   Resp 24   Ht 5' 7\" (1.702 m)   Wt 145 lb 8 oz (66 kg)   SpO2 93%   BMI 22.79 kg/m²  {  General appearance: awake weak dressing nose  HEENT: Head: Normal, normocephalic, atraumatic.   Neck: supple, symmetrical, trachea midline  Lungs: diminished breath sounds bilaterally  Heart: S1, S2 normal  Abdomen: abnormal findings:  soft nt  Extremities: edema trace positive AV fistula  Neurologic: Mental status: alertness: arousable        Assessment and Plan:      IMP:  #1 acute renal failure versus CKD 5 versus end-stage renal disease  #2 COVID-19 pneumonia  #3 positive pulm embolism  #4 hypotension  #5 hepatitis B surface antigen positive  #6 metabolic encephalopathy  #7 metabolic acidosis    Plan     1 stop labs draw  2 I dw hospice and they will meet with daughter at noon Thursday  3 not want dialysis anymore  4 avoid eliquis with nose bleed  5 family to come tomorrow for hospice meeting  6 all aware plan is for comfort care and no more aggressive therapy per pt wishes           Brii Lackey MD, MD

## 2022-01-19 NOTE — CONSULTS
[address] [place] [address] Consult Note    Date: 1/19/2022  Name: Adam Vanessa  MRN: [id number]  YOB: 1940   Patient's PCP: Gretel Rendon MD   Consultants during acute care: Neurology, Nephrology, Cardiology  Referring Physician: Dr Ragini Hood care admission date: 1/13/2022     Informant: Chart reviewed, discussed with Dr Ganga Kaufman. I examined the patient who mumbles and does not provide any information. CC: COVID-19, nose bleed, renal failure    [place]: This is a 80 y.o. male with history of history of cognitive dysfunction, schizoaffective disorder, hypertension, hyperlipidemia, CVA, diabetes mellitus, hyperlipidemia and end-stage renal disease was admitted on 1/13/2022 from [facility] with hypoxia and unresponsiveness. Patient was saturating in the 70s in the ER, and neuroimaging did not show acute infarct. The patient was found to be COVID-19 positive on 1/13/22, CT chest showed bilateral pulmonary emboli. The patient was anticoagulated with Heparin then Apixiban, now stopped after epistaxis requiring Rhinorocket. The patient has received Dexamethasone and Levofloxacin. The patient had been off hemodialysis for a period of time prior to this admission. He has had hemodialysis this admission and has told Dr Ganga Kaufman that he does not want additional dialysis and wants to be comfortable and is ready to die. The patient's daughter, Jennifer Lamb, is traveling from [place] on 1/20/22. The patient mumbles, and is not able to provide information to me. He is mildly restless but not combative. The patient is listed as limited code without CPR, meds, intubation or cardioversion.        Past Medical History:   Diagnosis Date    Anemia     Carotid stenosis     hx per old chart    CKD (chronic kidney disease)     stage 3 hx per old chart    Depression     Diabetes ([facility] [address]     Dystonia per pt    Fracture     per old chart hx fx right hand metacarpal-(9/9/2019) scheduled for surgery 10/4/2019    GERD (gastroesophageal reflux disease)     Hyperlipidemia     Hypertension     Hypothyroidism     Metabolic encephalopathy     dx with admission 6/2018    Neuropathy     Non-smoker per pt    Poor historian     Schizoaffective disorder ([facility] [address]     hx per old chart    Vitamin D deficiency     hx per old chart       Past Surgical History:   Procedure Laterality Date    ANKLE SURGERY Left 06/25/2016    [facility]   [place] BACK SURGERY      Cervical 3    COLONOSCOPY      ENDOSCOPY, COLON, DIAGNOSTIC      EYE SURGERY  09/2019    Left cataract surgery    FRACTURE SURGERY      Right Hand    HAND SURGERY Right 10/4/2019    HAND OPEN REDUCTION INTERNAL FIXATION RIGHT SECOND METACARPAL performed by Margarita Bonner DO at [facility] [address] Right     Inguinal Hernia    TONSILLECTOMY         Social History     Socioeconomic History    Marital status:      Spouse name: Not on file    Number of children: Not on file    Years of education: Not on file    Highest education level: Not on file   Occupational History    Not on file   Tobacco Use    Smoking status: Never Smoker    Smokeless tobacco: Never Used   Vaping Use    Vaping Use: Never used   Substance and Sexual Activity    Alcohol use: No     Alcohol/week: 0.0 standard drinks    Drug use: No    Sexual activity: Not on file   Other Topics Concern    Not on file   Social History Narrative    Not on file     Social Determinants of Health     Financial Resource Strain:     Difficulty of Paying Living Expenses: Not on file   Food Insecurity:     Worried About Running Out of Food in the Last Year: Not on file    Ernny of Food in the Last Year: Not on file   Transportation Needs:     Lack of Transportation (Medical): Not on file    Lack of Transportation (Non-Medical):  Not on file   Physical Activity:     Days of Exercise per Week: Not on file    Minutes of Exercise per Session: Not on file   Stress:     Feeling of Stress : Not on file   Social Connections:     Frequency of Communication with Friends and Family: Not on file    Frequency of Social Gatherings with Friends and Family: Not on file    Attends Druze Services: Not on file    Active Member of [address] [place] [address] [employer] or Organizations: Not on file    Attends Club or Organization Meetings: Not on file    Marital Status: Not on file   Intimate Partner Violence:     Fear of Current or Ex-Partner: Not on file    Emotionally Abused: Not on file    Physically Abused: Not on file    Sexually Abused: Not on file   Housing Stability:     Unable to Pay for Housing in the Last Year: Not on file    Number of [place] in the Last Year: Not on file    Unstable Housing in the Last Year: Not on file       Family History   Problem Relation Age of Onset    Diabetes Father     High Cholesterol Father     Heart Disease Maternal Grandmother     Heart Disease Maternal Grandfather     Stroke Maternal Grandfather     Heart Disease Paternal Grandmother     Diabetes Paternal Grandfather     Heart Disease Paternal Grandfather     High Blood Pressure Paternal Grandfather     Depression Daughter         attempted suicide       Allergies   Allergen Reactions    Amitriptyline Other (See Comments)     sedation  Other reaction(s): sedation  sedation    Codeine      States have used this with no issues  Other reaction(s): \"crazy\"  States have used this with no issues    Gabapentin      Neuro toxicity myoclonus     Suprax [Cefixime] Other (See Comments)     Pain       Medication list reviewed  Prior to Admission medications    Medication Sig Start Date End Date Taking? Authorizing Provider   morphine (MS CONTIN) 15 MG extended release tablet Take 1 tablet by mouth daily.  1/8/22   Historical Provider, MD   sertraline (ZOLOFT) 50 MG tablet Take 1 tablet by mouth daily 12/13/21   Historical Provider, MD   vitamin D (CHOLECALCIFEROL) 50 MCG (2000 UT) TABS tablet Take 1 tablet by mouth daily 12/20/21   Historical Provider, MD   ARIPiprazole (ABILIFY) 5 MG tablet Take 5 mg by mouth daily    Historical Provider, MD   acetaminophen (TYLENOL 8 HOUR) 650 MG extended release tablet Take 650 mg by mouth every 6 hours as needed for Pain    Historical Provider, MD   ammonium lactate (LAC-HYDRIN) 12 % lotion Apply topically daily Apply topically as needed. Historical Provider, MD   Multiple Vitamin (DAILY BOLA PO) Take 1 tablet by mouth daily    Historical Provider, MD   fluticasone (FLONASE) 50 MCG/ACT nasal spray 2 sprays by Each Nostril route daily    Historical Provider, MD   gabapentin (NEURONTIN) 300 MG capsule Take 300 mg by mouth 2 times daily. Historical Provider, MD   SITagliptin (JANUVIA) 50 MG tablet Take 50 mg by mouth daily    Historical Provider, MD   insulin glargine (LANTUS) 100 UNIT/ML injection vial Inject 25 Units into the skin nightly    Historical Provider, MD   sodium zirconium cyclosilicate (LOKELMA) 10 g PACK oral suspension Take 10 g by mouth daily    Historical Provider, MD   loperamide (IMODIUM) 2 MG capsule Take 2 mg by mouth as needed for Diarrhea    Historical Provider, MD   midodrine (PROAMATINE) 5 MG tablet Take 5 mg by mouth 2 times daily    Historical Provider, MD   magnesium hydroxide (MILK OF MAGNESIA) 400 MG/5ML suspension Take by mouth 2 times daily as needed for Constipation    Historical Provider, MD   insulin aspart (NOVOLOG) 100 UNIT/ML injection vial Inject into the skin 3 times daily (before meals) Sliding scale dose    Historical Provider, MD   pantoprazole (PROTONIX) 40 MG tablet Take 40 mg by mouth daily    Historical Provider, MD   traZODone (DESYREL) 50 MG tablet Take 25 mg by mouth nightly    Historical Provider, MD   traMADol (ULTRAM) 50 MG tablet Take 50 mg by mouth 2 times daily as needed for Pain.     Historical Provider, MD   hydrOXYzine (ATARAX) 50 MG tablet Take 1 tablet by mouth daily as needed for Itching 6/9/21 Manuela Acevedo MD   Cholecalciferol (VITAMIN D3) 2000 units CAPS Take by mouth Daily    Historical Provider, MD   atorvastatin (LIPITOR) 80 MG tablet Take 80 mg by mouth daily Indications: high cholesterol    Historical Provider, MD   senna (SENOKOT) 8.6 MG tablet Take 2 tablets by mouth daily Indications: constipation    Historical Provider, MD   donepezil (ARICEPT) 10 MG tablet Take 10 mg by mouth nightly    Historical Provider, MD   escitalopram (LEXAPRO) 20 MG tablet Take 20 mg by mouth daily    Historical Provider, MD   QUEtiapine (SEROQUEL) 50 MG tablet Take 1 tablet by mouth nightly  Patient taking differently: Take 75 mg by mouth 2 times daily  11/27/17   Ever Workman MD   aspirin 81 MG chewable tablet Take 1 tablet by mouth daily 5/17/17   Jose Jackson MD   busPIRone (BUSPAR) 5 MG tablet Take 1 tablet by mouth 2 times daily  Patient taking differently: Take 5 mg by mouth 3 times daily  5/17/17   Jose Jackson MD   levothyroxine (SYNTHROID) 100 MCG tablet Take 1 tablet by mouth Daily 5/18/17   Jose Jackson MD   memantine (NAMENDA) 5 MG tablet Take 1 tablet by mouth 2 times daily 5/17/17   Jose Jackson MD   melatonin 3 MG TABS tablet Take 1 tablet by mouth nightly as needed (sleep)  Patient taking differently: Take 10 mg by mouth nightly as needed (sleep)  5/17/17   Jose Jackson MD   albuterol sulfate  (90 BASE) MCG/ACT inhaler Inhale 2 puffs into the lungs every 6 hours as needed for Wheezing    Historical Provider, MD       ROS: As noted in [address], all other systems are unobtainable due to the patient's clinical condition     Weight:    Wt Readings from Last 5 Encounters:   01/16/22 156 lb 12.8 oz (71.1 kg)   09/08/21 167 lb 11.2 oz (76.1 kg)   06/09/21 167 lb 12.8 oz (76.1 kg)   05/13/21 167 lb (75.8 kg)   04/29/21 166 lb 12.8 oz (75.7 kg)       Data reviewed 1/19/2022:  Chest X-ray  1/13/22:  Mild right basilar opacity.  Considerations include atelectasis and mild   pneumonia.       Mild cardiac silhouette enlargement.  Mild central vascular congestion. CTA chest 1/13/22:  1. Suspected small right middle lobe segmental and subsegmental pulmonary   embolus.  Motion significantly degrades the quality of the exam.   2. Small foci of peripheral consolidation in the upper lobes.  Consider mild   COVID pneumonia. 3. Mild bibasilar atelectasis or pneumonia. 4. Splenomegaly. 5. Distended gallbladder.  Mild intra and extrahepatic biliary ductal   dilatation. CT-scan of the brain 1/13/22:  No acute intracranial hemorrhage, mass effect, midline shift, or   hydrocephalus.  Streak artifact over the frontal lobes but no sign of an   acute territorial infarct.       Volume loss with prominence of the ventricles and sulci are stable. CTA head and neck 1/13/22:  1.  No large vessel occlusion in the head or neck.       2.  Mild-to-moderate atherosclerotic disease.       3.  Scattered heterogeneous opacities in the visualized lung apices may   relate to an atypical infectious/inflammatory process including atypical or   viral/COVID-19 pneumonia versus mild pulmonary edema.       4.  C4-C5 osseous sclerosis and endplate erosions may relate to advanced   degenerative changes.  Underlying discitis/osteomyelitis is difficult to   entirely exclude and could be further evaluated with follow-up cervical spine   MRI. Transthoracic Echocardiogram 1/13/22:   Expedited imaging was used to obtain protocol images to reduce the   sonographer's exposure during COVID-19 pandemic. Left ventricular systolic function is normal.   Ejection fraction is visually estimated at 50-55%. Moderate aortic regurgitation; PHT: 498 msec. Mild tricuspid regurgitation; RVSP: 30 mmHg. No evidence of any pericardial effusion.     Hemoglobin A1C: no recent available  Accuchecks: Recent Labs     01/19/22  0737 01/19/22  1055 01/19/22  1547   POCGLU 108* 102* 139*     Hepatic Function Panel:    Lab Results   Component Value Date    ALKPHOS 79 01/14/2022    ALT 28 01/14/2022    AST 60 01/14/2022    PROT 5.9 01/14/2022    PROT 7.4 08/16/2011    BILITOT 0.3 01/14/2022    BILIDIR 0.2 01/14/2022    IBILI 0.1 01/14/2022    LABALBU 4.1 01/19/2022    LABALBU 3.8 05/19/2021     CBC:   Recent Labs     01/18/22  0926 01/18/22  1615 01/19/22  0126   WBC 7.2 6.7 8.6   HGB 8.9* 8.8* 7.8*   HCT 28.4* 28.9* 25.2*   MCV 89.6 91.2 90.0    176 197     BMP:   Recent Labs     01/17/22  1307 01/18/22  0926 01/19/22  0126    140 140   K 4.9 3.8 4.2    100 101   CO2 23 26 26   BUN 98* 56* 68*   CREATININE 4.6* 3.2* 3.2*   GLUCOSE 143* 95 168*       Physical Exam:   BP (!) 144/60   Pulse 84   Temp 98.6 °F (37 °C)   Resp 18   Ht 5' 7\" (1.702 m)   Wt 156 lb 12.8 oz (71.1 kg)   SpO2 94%   BMI 24.56 kg/m²   General: drowsy but arousable, mumbles,   HEENT: Right nasal packing in place, mucous membranes are dry, sclerae are clear, mild conjunctival pallor   Neck: carotid upstrokes are diminished without bruit  Heart: distant tones, RRR, S1S2, no murmurs  Lungs:  equal breath sounds bilaterally, diminished at the bases, without rales, rhonchi   Abdomen: soft, bowel sounds present, no apparent tenderness, nondistended  Extremities:  No mottling, + edema  Neurologic: lethargic, mumbles,     Assessment/Plan:  1. Toxic/metabolic encephalopathy   2. COVID-19 pneumonitis, not currently on oxygen,   3. ESRD, stopping hemodialysis. The patient has gone for periods of time without hemodialysis in the past  4. Bilateral pulmonary emboli, off anticoagulation due to severe epistaxis  5. Baseline cognitive dysfunction, schizoaffective disorder  6. Epistaxis  7. Cerebrovascular disease  8. Hospice to discuss with the patient's daughter on arrival. The patient is hospice eligible, and perhaps [facility] eligible given active recent decline, pain, dyspnea. Will follow for plan of care.       Patient Active Problem List   Diagnosis Code    Altered mental status R41.82    Essential hypertension I10    Low back pain M54.50    Acquired hypothyroidism E03.9    Panic disorder F41.0    Dystonia G24.9    Chronic kidney disease (CKD) stage G3a/A3, moderately decreased glomerular filtration rate (GFR) between 45-59 mL/min/1.73 square meter and albuminuria creatinine ratio greater than 300 mg/g ([facility]) N18.31    Type 2 diabetes mellitus without complication ([facility]) Z39.6    Ambulatory dysfunction R26.2    Non compliance w medication regimen Z91.14    Hyperlipidemia E78.5    Closed fracture of distal end of fibula S82.839A    Acute metabolic encephalopathy O24.28    SO (acute kidney injury) ([facility] [address] N17.9    Encephalopathy acute G93.40    Closed displaced fracture of shaft of second metacarpal bone of right hand S62.320A    Moderate episode of recurrent major depressive disorder ([facility]) F33.1    Generalized anxiety disorder F41.1    ESRD (end stage renal disease) ([facility]) N18.6    ESRD on hemodialysis ([facility]) N18.6, Z99.2    AVF (arteriovenous fistula) ([facility]) I77.0    Hyperkalemia E87.5    Chronic kidney disease, stage IV (severe) ([facility]) N18.4    Acquired renal cyst N28.1    Allergic rhinitis J30.9    Anemia due to stage 4 chronic kidney disease ([facility]) N18.4, D63.1    Balance disorder R26.89    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    Gastroesophageal reflux disease K21.9    Impaired mobility Z74.09    Insomnia due to other mental disorder F51.05, F99    Iron deficiency E61.1    Memory impairment R41.3    Microalbuminuria due to type 2 diabetes mellitus ([facility] [address] E11.29, R80.9    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    Right inguinal hernia K40.90    Schizoaffective disorder ([facility]) F25.9    Seasonal allergies J30.2    Severe episode of recurrent major depressive disorder, without psychotic features ([facility] [address] F33.2    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

## 2022-01-19 NOTE — PLAN OF CARE
Problem: Pain:  Goal: Pain level will decrease  Description: Pain level will decrease  Outcome: Ongoing  Goal: Control of acute pain  Description: Control of acute pain  Outcome: Ongoing  Goal: Control of chronic pain  Description: Control of chronic pain  Outcome: Ongoing     Problem: Falls - Risk of:  Goal: Will remain free from falls  Description: Will remain free from falls  Outcome: Ongoing  Goal: Absence of physical injury  Description: Absence of physical injury  Outcome: Ongoing     Problem: Skin Integrity:  Goal: Will show no infection signs and symptoms  Description: Will show no infection signs and symptoms  Outcome: Ongoing  Goal: Absence of new skin breakdown  Description: Absence of new skin breakdown  Outcome: Ongoing     Problem: Pain:  Goal: Pain level will decrease  Description: Pain level will decrease  1/19/2022 1058 by Carmen Thapa LPN  Outcome: Ongoing  1/19/2022 1057 by Carmen Thapa LPN  Outcome: Ongoing  Goal: Control of acute pain  Description: Control of acute pain  1/19/2022 1058 by Carmen Thapa LPN  Outcome: Ongoing  1/19/2022 1057 by Carmen Thapa LPN  Outcome: Ongoing  Goal: Control of chronic pain  Description: Control of chronic pain  1/19/2022 1058 by Carmen Thapa LPN  Outcome: Ongoing  1/19/2022 1057 by Carmen Thapa LPN  Outcome: Ongoing     Problem: Falls - Risk of:  Goal: Will remain free from falls  Description: Will remain free from falls  1/19/2022 1058 by Carmen Thapa LPN  Outcome: Ongoing  1/19/2022 1057 by Carmen Thapa LPN  Outcome: Ongoing  Goal: Absence of physical injury  Description: Absence of physical injury  1/19/2022 1058 by Carmen Thapa LPN  Outcome: Ongoing  1/19/2022 1057 by Carmen Thapa LPN  Outcome: Ongoing     Problem: Skin Integrity:  Goal: Will show no infection signs and symptoms  Description: Will show no infection signs and symptoms  1/19/2022 1058 by Carmen Thapa LPN  Outcome: Ongoing  1/19/2022 1057 by Carmen Thapa LPN  Outcome: Ongoing  Goal: Absence of new skin breakdown  Description: Absence of new skin breakdown  1/19/2022 1058 by Mary Alvarado LPN  Outcome: Ongoing  1/19/2022 1057 by Mary Alvarado LPN  Outcome: Ongoing     Problem: Sensory:  Goal: Ability to identify factors that increase the pain will improve  Description: Ability to identify factors that increase the pain will improve  Outcome: Ongoing  Goal: Ability to demonstrate ways to enhance comfort will improve  Description: Ability to demonstrate ways to enhance comfort will improve  Outcome: Ongoing  Goal: General experience of comfort will improve  Description: General experience of comfort will improve  Outcome: Ongoing     Problem: Airway Clearance - Ineffective  Goal: Achieve or maintain patent airway  Outcome: Ongoing     Problem: Gas Exchange - Impaired  Goal: Absence of hypoxia  Outcome: Ongoing  Goal: Promote optimal lung function  Outcome: Ongoing     Problem: Breathing Pattern - Ineffective  Goal: Ability to achieve and maintain a regular respiratory rate  Outcome: Ongoing     Problem:  Body Temperature -  Risk of, Imbalanced  Goal: Ability to maintain a body temperature within defined limits  Outcome: Ongoing  Goal: Will regain or maintain usual level of consciousness  Outcome: Ongoing  Goal: Complications related to the disease process, condition or treatment will be avoided or minimized  Outcome: Ongoing     Problem: Isolation Precautions - Risk of Spread of Infection  Goal: Prevent transmission of infection  Outcome: Ongoing     Problem: Nutrition Deficits  Goal: Optimize nutritional status  Outcome: Ongoing     Problem: Risk for Fluid Volume Deficit  Goal: Maintain normal heart rhythm  Outcome: Ongoing  Goal: Maintain absence of muscle cramping  Outcome: Ongoing  Goal: Maintain normal serum potassium, sodium, calcium, phosphorus, and pH  Outcome: Ongoing     Problem: Loneliness or Risk for Loneliness  Goal: Demonstrate positive use of time alone when socialization is not possible  Outcome: Ongoing     Problem: Fatigue  Goal: Verbalize increase energy and improved vitality  Outcome: Ongoing     Problem: Patient Education: Go to Patient Education Activity  Goal: Patient/Family Education  Outcome: Ongoing     Problem: Bleeding:  Goal: Will show no signs and symptoms of excessive bleeding  Description: Will show no signs and symptoms of excessive bleeding  Outcome: Ongoing

## 2022-01-19 NOTE — PROGRESS NOTES
[facility] HOSPITALIST PROGRESS NOTE      PCP: Nuvia Rocha MD    Date of Admission: 1/13/2022    Subjective:  Resting comfortably     Brief Hospital summary patient is a 71-year-old male with history of hypertension, hyperlipidemia, CVA, diabetes mellitus, dementia, hyperlipidemia and end-stage renal disease was admitted with hypoxia and unresponsiveness. Patient was saturating 70s in the ER, had pinpoint pupils improved after Narcan given, CT showed bilateral PE, positive for COVID, heparin infusion started  Patient was admitted to ICU, cardiology and nephrology consulted  Underwent dialysis, switched to apixaban  Oxygen requirement improved    1/17 patient was refusing dialysis, hospice consulted  Patient had epistaxis, refused dialysis, apixaban and aspirin held  Status post Rhino Rocket and Carlos Eduardo-Synephrine locally, hemoglobin remained stable      Vitals signs: Afebrile, heart rate 80s range, blood pressure 150/77, on room air    Medications: Apixaban, Abilify, aspirin, Lipitor, buspirone, dexamethasone, donepezil, Lantus, sliding scale insulin, levothyroxine, levofloxacin, MS Contin, midodrine, memantine, sertraline, pantoprazole, Seroquel, vancomycin    Antibiotics: *Levofloxacin day 5  Vancomycin stopped     Fluid status: Urine output not documented    Labs: Sodium 140, potassium 4.2, chloride 1, bicarb 26, creatinine 3.2  WBC 8.6, hemoglobin 7.8, platelets 251    Imaging:   CT chest without contrast   1. Suspected small right middle lobe segmental and subsegmental pulmonary   embolus.  Motion significantly degrades the quality of the exam.   2. Small foci of peripheral consolidation in the upper lobes.  Consider mild   COVID pneumonia. 3. Mild bibasilar atelectasis or pneumonia. 4. Splenomegaly.    5. Distended gallbladder.  Mild intra and extrahepatic biliary ductal   Dilatation    Assessment/Plan:     Acute metabolic encephalopathy  Severe sepsis secondary to COVID-19 pneumonia with acute 01/18/22  0926 01/18/22  1615 01/19/22  0126   WBC 7.2 6.7 8.6   HGB 8.9* 8.8* 7.8*   HCT 28.4* 28.9* 25.2*    176 197     Recent Labs     01/17/22  1307 01/18/22  0926 01/19/22  0126    140 140   K 4.9 3.8 4.2    100 101   CO2 23 26 26   BUN 98* 56* 68*   CREATININE 4.6* 3.2* 3.2*   CALCIUM 8.1* 8.2* 8.8   PHOS 3.6 2.3* 2.7     No results for input(s): AST, ALT, BILIDIR, BILITOT, ALKPHOS in the last 72 hours. No results for input(s): INR in the last 72 hours. No results for input(s): Meldon Cuba in the last 72 hours. Urinalysis:      Lab Results   Component Value Date    NITRU NEGATIVE 01/16/2022    WBCUA 1 01/16/2022    BACTERIA RARE 01/16/2022    RBCUA 3 01/16/2022    BLOODU SMALL 01/16/2022    SPECGRAV 1.021 01/16/2022       Radiology:  XR CHEST PORTABLE   Final Result   Mild right basilar opacity. Considerations include atelectasis and mild   pneumonia. Mild cardiac silhouette enlargement. Mild central vascular congestion. CTA CHEST W CONTRAST   Final Result   1. Suspected small right middle lobe segmental and subsegmental pulmonary   embolus. Motion significantly degrades the quality of the exam.   2. Small foci of peripheral consolidation in the upper lobes. Consider mild   COVID pneumonia. 3. Mild bibasilar atelectasis or pneumonia. 4. Splenomegaly. 5. Distended gallbladder. Mild intra and extrahepatic biliary ductal   dilatation. This report was discussed with Dr. Isra Collado at 3:53 a.m. on 01/13/2022. Clement Fly CTA HEAD NECK W CONTRAST   Final Result   1. No large vessel occlusion in the head or neck. 2.  Mild-to-moderate atherosclerotic disease. 3.  Scattered heterogeneous opacities in the visualized lung apices may   relate to an atypical infectious/inflammatory process including atypical or   viral/COVID-19 pneumonia versus mild pulmonary edema.       4.  C4-C5 osseous sclerosis and endplate erosions may relate to advanced degenerative changes. Underlying discitis/osteomyelitis is difficult to   entirely exclude and could be further evaluated with follow-up cervical spine   MRI. CT HEAD WO CONTRAST   Final Result   No acute intracranial hemorrhage, mass effect, midline shift, or   hydrocephalus. Streak artifact over the frontal lobes but no sign of an   acute territorial infarct. Volume loss with prominence of the ventricles and sulci are stable. Critical results were called by Dr. Iris Zepeda MD to Dr Jones Spann   on 1/13/2022 at 02:42.                  Graciela Thomsa MD  1/19/2022 10:09 AM

## 2022-01-19 NOTE — CARE COORDINATION
LSW spoke with pt deyvi/MICHAEL Dan and spoke with her regarding the Hospice referral.  ht is agreeable to [facility]. LSW called  [facility] and spoke Lauren and gave referral.  They will call [facility] to set up a meeting. [facility] is flying in from [place] and will be here tomorrow after 10.

## 2022-01-20 ENCOUNTER — HOSPITAL ENCOUNTER (INPATIENT)
Age: 82
LOS: 6 days | Discharge: HOSPICE | DRG: 951 | End: 2022-01-26
Attending: FAMILY MEDICINE | Admitting: FAMILY MEDICINE
Payer: COMMERCIAL

## 2022-01-20 VITALS
OXYGEN SATURATION: 94 % | TEMPERATURE: 97.7 F | HEIGHT: 67 IN | WEIGHT: 141.4 LBS | DIASTOLIC BLOOD PRESSURE: 61 MMHG | SYSTOLIC BLOOD PRESSURE: 131 MMHG | RESPIRATION RATE: 16 BRPM | BODY MASS INDEX: 22.19 KG/M2 | HEART RATE: 97 BPM

## 2022-01-20 DIAGNOSIS — G92.8 TOXIC METABOLIC ENCEPHALOPATHY: Primary | ICD-10-CM

## 2022-01-20 DIAGNOSIS — N18.6 ESRD (END STAGE RENAL DISEASE) (HCC): ICD-10-CM

## 2022-01-20 DIAGNOSIS — Z51.5 HOSPICE CARE: ICD-10-CM

## 2022-01-20 PROBLEM — E44.0 MODERATE MALNUTRITION (HCC): Chronic | Status: ACTIVE | Noted: 2022-01-20

## 2022-01-20 PROBLEM — G24.3 SPASMODIC TORTICOLLIS: Status: RESOLVED | Noted: 2019-03-11 | Resolved: 2022-01-20

## 2022-01-20 PROBLEM — K40.90 RIGHT INGUINAL HERNIA: Status: RESOLVED | Noted: 2019-03-11 | Resolved: 2022-01-20

## 2022-01-20 PROBLEM — J30.2 SEASONAL ALLERGIES: Status: RESOLVED | Noted: 2020-12-22 | Resolved: 2022-01-20

## 2022-01-20 LAB
APTT: 226.4 SECONDS (ref 25.1–37.1)
APTT: >240 SECONDS (ref 25.1–37.1)
CULTURE: ABNORMAL
GLUCOSE BLD-MCNC: 111 MG/DL (ref 70–99)
Lab: ABNORMAL
SPECIMEN: ABNORMAL

## 2022-01-20 PROCEDURE — 6360000002 HC RX W HCPCS: Performed by: HOSPITALIST

## 2022-01-20 PROCEDURE — 6370000000 HC RX 637 (ALT 250 FOR IP): Performed by: FAMILY MEDICINE

## 2022-01-20 PROCEDURE — 6370000000 HC RX 637 (ALT 250 FOR IP): Performed by: STUDENT IN AN ORGANIZED HEALTH CARE EDUCATION/TRAINING PROGRAM

## 2022-01-20 PROCEDURE — 36415 COLL VENOUS BLD VENIPUNCTURE: CPT

## 2022-01-20 PROCEDURE — 2580000003 HC RX 258: Performed by: FAMILY MEDICINE

## 2022-01-20 PROCEDURE — 6360000002 HC RX W HCPCS: Performed by: STUDENT IN AN ORGANIZED HEALTH CARE EDUCATION/TRAINING PROGRAM

## 2022-01-20 PROCEDURE — 1250000000 HC SEMI PRIVATE HOSPICE R&B

## 2022-01-20 PROCEDURE — 85730 THROMBOPLASTIN TIME PARTIAL: CPT

## 2022-01-20 PROCEDURE — 2580000003 HC RX 258: Performed by: STUDENT IN AN ORGANIZED HEALTH CARE EDUCATION/TRAINING PROGRAM

## 2022-01-20 PROCEDURE — 6360000002 HC RX W HCPCS: Performed by: NURSE PRACTITIONER

## 2022-01-20 PROCEDURE — 82962 GLUCOSE BLOOD TEST: CPT

## 2022-01-20 PROCEDURE — 94761 N-INVAS EAR/PLS OXIMETRY MLT: CPT

## 2022-01-20 RX ORDER — SODIUM CHLORIDE 0.9 % (FLUSH) 0.9 %
5-40 SYRINGE (ML) INJECTION EVERY 12 HOURS SCHEDULED
Status: CANCELLED | OUTPATIENT
Start: 2022-01-20

## 2022-01-20 RX ORDER — ARIPIPRAZOLE 5 MG/1
5 TABLET ORAL DAILY
Status: DISCONTINUED | OUTPATIENT
Start: 2022-01-21 | End: 2022-01-26 | Stop reason: HOSPADM

## 2022-01-20 RX ORDER — ACETAMINOPHEN 650 MG/1
650 SUPPOSITORY RECTAL EVERY 4 HOURS PRN
Status: CANCELLED | OUTPATIENT
Start: 2022-01-20

## 2022-01-20 RX ORDER — LORAZEPAM 2 MG/ML
1 INJECTION INTRAMUSCULAR
Status: DISCONTINUED | OUTPATIENT
Start: 2022-01-20 | End: 2022-01-22

## 2022-01-20 RX ORDER — BISACODYL 10 MG
10 SUPPOSITORY, RECTAL RECTAL DAILY PRN
Status: DISCONTINUED | OUTPATIENT
Start: 2022-01-20 | End: 2022-01-26 | Stop reason: HOSPADM

## 2022-01-20 RX ORDER — ARIPIPRAZOLE 5 MG/1
5 TABLET ORAL DAILY
Status: CANCELLED | OUTPATIENT
Start: 2022-01-20

## 2022-01-20 RX ORDER — GLYCOPYRROLATE 1 MG/5 ML
0.2 SYRINGE (ML) INTRAVENOUS EVERY 4 HOURS PRN
Status: DISCONTINUED | OUTPATIENT
Start: 2022-01-20 | End: 2022-01-26 | Stop reason: HOSPADM

## 2022-01-20 RX ORDER — GLYCOPYRROLATE 0.2 MG/ML
0.2 INJECTION INTRAMUSCULAR; INTRAVENOUS EVERY 4 HOURS PRN
Status: DISCONTINUED | OUTPATIENT
Start: 2022-01-20 | End: 2022-01-20 | Stop reason: CLARIF

## 2022-01-20 RX ORDER — SODIUM CHLORIDE 0.9 % (FLUSH) 0.9 %
5-40 SYRINGE (ML) INJECTION PRN
Status: DISCONTINUED | OUTPATIENT
Start: 2022-01-20 | End: 2022-01-26 | Stop reason: HOSPADM

## 2022-01-20 RX ORDER — HYDROMORPHONE HCL 110MG/55ML
0.5 PATIENT CONTROLLED ANALGESIA SYRINGE INTRAVENOUS
Status: CANCELLED | OUTPATIENT
Start: 2022-01-20

## 2022-01-20 RX ORDER — MORPHINE SULFATE 20 MG/ML
10 SOLUTION ORAL EVERY 4 HOURS PRN
Status: DISCONTINUED | OUTPATIENT
Start: 2022-01-20 | End: 2022-01-20 | Stop reason: HOSPADM

## 2022-01-20 RX ORDER — LORAZEPAM 2 MG/ML
0.5 INJECTION INTRAMUSCULAR
Status: DISCONTINUED | OUTPATIENT
Start: 2022-01-20 | End: 2022-01-22

## 2022-01-20 RX ORDER — LEVOTHYROXINE SODIUM 0.1 MG/1
100 TABLET ORAL DAILY
Status: CANCELLED | OUTPATIENT
Start: 2022-01-20

## 2022-01-20 RX ORDER — SODIUM CHLORIDE 9 MG/ML
25 INJECTION, SOLUTION INTRAVENOUS PRN
Status: DISCONTINUED | OUTPATIENT
Start: 2022-01-20 | End: 2022-01-26 | Stop reason: HOSPADM

## 2022-01-20 RX ORDER — ALBUTEROL SULFATE 90 UG/1
2 AEROSOL, METERED RESPIRATORY (INHALATION) EVERY 4 HOURS PRN
Status: CANCELLED | OUTPATIENT
Start: 2022-01-20

## 2022-01-20 RX ORDER — HALOPERIDOL 5 MG/ML
2 INJECTION INTRAMUSCULAR ONCE
Status: COMPLETED | OUTPATIENT
Start: 2022-01-20 | End: 2022-01-20

## 2022-01-20 RX ORDER — GLYCOPYRROLATE 1 MG/5 ML
0.1 SYRINGE (ML) INTRAVENOUS ONCE
Status: CANCELLED | OUTPATIENT
Start: 2022-01-20 | End: 2022-01-20

## 2022-01-20 RX ORDER — HALOPERIDOL 5 MG/ML
2 INJECTION INTRAMUSCULAR EVERY 6 HOURS PRN
Status: DISCONTINUED | OUTPATIENT
Start: 2022-01-20 | End: 2022-01-20 | Stop reason: HOSPADM

## 2022-01-20 RX ORDER — GLYCOPYRROLATE 0.2 MG/ML
0.2 INJECTION INTRAMUSCULAR; INTRAVENOUS EVERY 4 HOURS PRN
Status: CANCELLED | OUTPATIENT
Start: 2022-01-20

## 2022-01-20 RX ORDER — QUETIAPINE FUMARATE 25 MG/1
75 TABLET, FILM COATED ORAL 2 TIMES DAILY
Status: CANCELLED | OUTPATIENT
Start: 2022-01-20

## 2022-01-20 RX ORDER — ACETAMINOPHEN 650 MG/1
650 SUPPOSITORY RECTAL EVERY 4 HOURS PRN
Status: DISCONTINUED | OUTPATIENT
Start: 2022-01-20 | End: 2022-01-26 | Stop reason: HOSPADM

## 2022-01-20 RX ORDER — SODIUM CHLORIDE 0.9 % (FLUSH) 0.9 %
5-40 SYRINGE (ML) INJECTION EVERY 12 HOURS SCHEDULED
Status: DISCONTINUED | OUTPATIENT
Start: 2022-01-20 | End: 2022-01-26 | Stop reason: HOSPADM

## 2022-01-20 RX ORDER — HALOPERIDOL 5 MG/ML
1 INJECTION INTRAMUSCULAR
Status: DISCONTINUED | OUTPATIENT
Start: 2022-01-20 | End: 2022-01-26 | Stop reason: HOSPADM

## 2022-01-20 RX ORDER — ALBUTEROL SULFATE 90 UG/1
2 AEROSOL, METERED RESPIRATORY (INHALATION) EVERY 4 HOURS PRN
Status: DISCONTINUED | OUTPATIENT
Start: 2022-01-20 | End: 2022-01-26 | Stop reason: HOSPADM

## 2022-01-20 RX ORDER — QUETIAPINE FUMARATE 25 MG/1
75 TABLET, FILM COATED ORAL 2 TIMES DAILY
Status: DISCONTINUED | OUTPATIENT
Start: 2022-01-20 | End: 2022-01-22

## 2022-01-20 RX ORDER — BISACODYL 10 MG
10 SUPPOSITORY, RECTAL RECTAL DAILY PRN
Status: CANCELLED | OUTPATIENT
Start: 2022-01-20

## 2022-01-20 RX ORDER — SODIUM CHLORIDE 9 MG/ML
25 INJECTION, SOLUTION INTRAVENOUS PRN
Status: CANCELLED | OUTPATIENT
Start: 2022-01-20

## 2022-01-20 RX ORDER — SODIUM CHLORIDE 0.9 % (FLUSH) 0.9 %
5-40 SYRINGE (ML) INJECTION PRN
Status: CANCELLED | OUTPATIENT
Start: 2022-01-20

## 2022-01-20 RX ORDER — LORAZEPAM 2 MG/ML
1 INJECTION INTRAMUSCULAR
Status: CANCELLED | OUTPATIENT
Start: 2022-01-20

## 2022-01-20 RX ORDER — LORAZEPAM 2 MG/ML
0.5 INJECTION INTRAMUSCULAR
Status: CANCELLED | OUTPATIENT
Start: 2022-01-20

## 2022-01-20 RX ORDER — HYDROMORPHONE HCL 110MG/55ML
0.5 PATIENT CONTROLLED ANALGESIA SYRINGE INTRAVENOUS
Status: DISCONTINUED | OUTPATIENT
Start: 2022-01-20 | End: 2022-01-26 | Stop reason: HOSPADM

## 2022-01-20 RX ORDER — HALOPERIDOL 5 MG/ML
1 INJECTION INTRAMUSCULAR
Status: CANCELLED | OUTPATIENT
Start: 2022-01-20

## 2022-01-20 RX ORDER — LEVOTHYROXINE SODIUM 0.1 MG/1
100 TABLET ORAL DAILY
Status: DISCONTINUED | OUTPATIENT
Start: 2022-01-21 | End: 2022-01-26

## 2022-01-20 RX ADMIN — MORPHINE SULFATE 15 MG: 15 TABLET, FILM COATED, EXTENDED RELEASE ORAL at 11:36

## 2022-01-20 RX ADMIN — HALOPERIDOL LACTATE 2 MG: 5 INJECTION, SOLUTION INTRAMUSCULAR at 08:38

## 2022-01-20 RX ADMIN — MEMANTINE HYDROCHLORIDE 5 MG: 5 TABLET ORAL at 11:36

## 2022-01-20 RX ADMIN — PANTOPRAZOLE SODIUM 40 MG: 40 TABLET, DELAYED RELEASE ORAL at 11:36

## 2022-01-20 RX ADMIN — MIDODRINE HYDROCHLORIDE 5 MG: 5 TABLET ORAL at 11:37

## 2022-01-20 RX ADMIN — DEXAMETHASONE SODIUM PHOSPHATE 6 MG: 10 INJECTION INTRAMUSCULAR; INTRAVENOUS at 11:38

## 2022-01-20 RX ADMIN — ARIPIPRAZOLE 5 MG: 5 TABLET ORAL at 11:37

## 2022-01-20 RX ADMIN — QUETIAPINE FUMARATE 75 MG: 25 TABLET ORAL at 11:36

## 2022-01-20 RX ADMIN — LORAZEPAM 1 MG: 2 INJECTION INTRAMUSCULAR at 08:26

## 2022-01-20 RX ADMIN — LORAZEPAM 1 MG: 2 INJECTION INTRAMUSCULAR; INTRAVENOUS at 00:03

## 2022-01-20 RX ADMIN — Medication 2000 UNITS: at 11:36

## 2022-01-20 RX ADMIN — SODIUM CHLORIDE, PRESERVATIVE FREE 10 ML: 5 INJECTION INTRAVENOUS at 11:38

## 2022-01-20 RX ADMIN — SERTRALINE HYDROCHLORIDE 50 MG: 50 TABLET ORAL at 11:37

## 2022-01-20 RX ADMIN — SODIUM CHLORIDE, PRESERVATIVE FREE 10 ML: 5 INJECTION INTRAVENOUS at 22:13

## 2022-01-20 RX ADMIN — BUSPIRONE HYDROCHLORIDE 5 MG: 5 TABLET ORAL at 11:36

## 2022-01-20 RX ADMIN — LORAZEPAM 1 MG: 2 INJECTION INTRAMUSCULAR at 03:17

## 2022-01-20 NOTE — PROGRESS NOTES
Occupational Therapy    Per therapy triage process, the OT referral has been discharged. Pt and pt's family have decided upon hospice services. Pt no longer qualifies for skilled therapy services.  Please re-order in future if so desired and pt is medically appropriate    Garret Herbert OTR/L [id number]  6:44 AM,1/20/2022

## 2022-01-20 NOTE — PROGRESS NOTES
[address] [place] [address] follow up      I have collaborated with the hospice nurse, and the patient's daughter has arrived from [place]. Luciana requests comfort focused care, no additional hemodialysis, [facility] due to agitated delirium, and restlessness. I will place discharge readmit orders.     Benitez Cruz MD

## 2022-01-20 NOTE — PROGRESS NOTES
[facility] HOSPITALIST PROGRESS NOTE      PCP: Rajesh Martinez MD    Date of Admission: 1/13/2022    Subjective:  Resting comfortably     Brief Hospital summary patient is a 70-year-old male with history of hypertension, hyperlipidemia, CVA, diabetes mellitus, dementia, hyperlipidemia and end-stage renal disease was admitted with hypoxia and unresponsiveness. Patient was saturating 70s in the ER, had pinpoint pupils improved after Narcan given, CT showed bilateral PE, positive for COVID, heparin infusion started  Patient was admitted to ICU, cardiology and nephrology consulted  Underwent dialysis, switched to apixaban  Oxygen requirement improved    1/17 patient was refusing dialysis, hospice consulted  Patient had epistaxis, refused dialysis, apixaban and aspirin held  Status post Rhino Rocket and Carlos Eduardo-Synephrine locally, hemoglobin remained stable    Hospice consulted    Vitals signs: Afebrile, heart rate 80s range, blood pressure 166/77, on room air    Medications: Apixaban, Abilify, aspirin, Lipitor, buspirone, dexamethasone, donepezil, Lantus, sliding scale insulin, levothyroxine, levofloxacin, MS Contin, midodrine, memantine, sertraline, pantoprazole, Seroquel, vancomycin    Antibiotics: Antibiotic stopped    Fluid status: Urine output not documented    Labs: Sodium 140, potassium 4.2, chloride 1, bicarb 26, creatinine 3.2  WBC 8.6, hemoglobin 7.8, platelets 201    Imaging:   CT chest without contrast   1. Suspected small right middle lobe segmental and subsegmental pulmonary   embolus.  Motion significantly degrades the quality of the exam.   2. Small foci of peripheral consolidation in the upper lobes.  Consider mild   COVID pneumonia. 3. Mild bibasilar atelectasis or pneumonia. 4. Splenomegaly.    5. Distended gallbladder.  Mild intra and extrahepatic biliary ductal   Dilatation    Assessment/Plan:     Acute metabolic encephalopathy  Severe sepsis secondary to COVID-19 pneumonia with acute hypoxic respiratory failure  Pulmonary embolism bilateral  History of dementia  History of mood disorder  Chronic anemia  Mild elevated troponin  Continuous opioid dependence  End-stage renal disease  Diabetes mellitus type 2  Chronic hypertension  Hyperlipidemia  Hypothyroidism  Epistaxis      Patient was admitted with syncope, CT positive for PE, was on heparin, switched to apixaban  COVID-positive, dexamethasone started, oxygen requirement down to 2 L  Patient had acute kidney injury on admission, underwent dialysis, refusing dialysis hospice consulted  Had epistaxis, apixaban was stopped   Will start heparin for now as epistaxis resolved   Hospice consulted     For agitation  As needed Ativan,  Hospice consulted      Continue donepezil, Abilify, sertraline, quetiapine    Continue aspirin statin  Continue levothyroxine    Resume MS Contin    Continue Flomax     DVT prophlaxis:   apixaban       Physical Exam Performed:       BP (!) 166/77   Pulse 98   Temp 97.9 °F (36.6 °C) (Axillary)   Resp 15   Ht 5' 7\" (1.702 m)   Wt 141 lb 6.4 oz (64.1 kg)   SpO2 90%   BMI 22.15 kg/m²     Physical Exam  Constitutional:       General: He is not in acute distress. HENT:      Head: Normocephalic and atraumatic. Right Ear: External ear normal.      Left Ear: External ear normal.      Nose:      Comments: Right nose packing in place   Cardiovascular:      Rate and Rhythm: Normal rate and regular rhythm. Heart sounds: No murmur heard. Pulmonary:      Effort: Pulmonary effort is normal. No respiratory distress. Breath sounds: Normal breath sounds. No wheezing. Abdominal:      General: Bowel sounds are normal. There is no distension. Palpations: Abdomen is soft. Tenderness: There is no abdominal tenderness. Musculoskeletal:         General: No swelling. Cervical back: Normal range of motion. Skin:     General: Skin is warm. Neurological:      Cranial Nerves: No cranial nerve deficit. Comments: Somnolent          Labs:   Recent Labs     01/18/22  1615 01/19/22  0126 01/19/22  1631   WBC 6.7 8.6 11.0*   HGB 8.8* 7.8* 8.5*   HCT 28.9* 25.2* 27.4*    197 227     Recent Labs     01/17/22  1307 01/18/22  0926 01/19/22  0126    140 140   K 4.9 3.8 4.2    100 101   CO2 23 26 26   BUN 98* 56* 68*   CREATININE 4.6* 3.2* 3.2*   CALCIUM 8.1* 8.2* 8.8   PHOS 3.6 2.3* 2.7     No results for input(s): AST, ALT, BILIDIR, BILITOT, ALKPHOS in the last 72 hours. No results for input(s): INR in the last 72 hours. No results for input(s): Skippy Gault in the last 72 hours. Urinalysis:      Lab Results   Component Value Date    NITRU NEGATIVE 01/16/2022    WBCUA 1 01/16/2022    BACTERIA RARE 01/16/2022    RBCUA 3 01/16/2022    BLOODU SMALL 01/16/2022    SPECGRAV 1.021 01/16/2022       Radiology:  XR CHEST PORTABLE   Final Result   Mild right basilar opacity. Considerations include atelectasis and mild   pneumonia. Mild cardiac silhouette enlargement. Mild central vascular congestion. CTA CHEST W CONTRAST   Final Result   1. Suspected small right middle lobe segmental and subsegmental pulmonary   embolus. Motion significantly degrades the quality of the exam.   2. Small foci of peripheral consolidation in the upper lobes. Consider mild   COVID pneumonia. 3. Mild bibasilar atelectasis or pneumonia. 4. Splenomegaly. 5. Distended gallbladder. Mild intra and extrahepatic biliary ductal   dilatation. This report was discussed with Dr. Ronak Kenny at 3:53 a.m. on 01/13/2022. Jonathan Case CTA HEAD NECK W CONTRAST   Final Result   1. No large vessel occlusion in the head or neck. 2.  Mild-to-moderate atherosclerotic disease. 3.  Scattered heterogeneous opacities in the visualized lung apices may   relate to an atypical infectious/inflammatory process including atypical or   viral/COVID-19 pneumonia versus mild pulmonary edema.       4.  C4-C5 osseous sclerosis and endplate erosions may relate to advanced   degenerative changes. Underlying discitis/osteomyelitis is difficult to   entirely exclude and could be further evaluated with follow-up cervical spine   MRI. CT HEAD WO CONTRAST   Final Result   No acute intracranial hemorrhage, mass effect, midline shift, or   hydrocephalus. Streak artifact over the frontal lobes but no sign of an   acute territorial infarct. Volume loss with prominence of the ventricles and sulci are stable. Critical results were called by Dr. Yash Dixon MD to Dr Eboni Dockery   on 1/13/2022 at 02:42.                  Sharad Cook MD  1/20/2022 11:28 AM

## 2022-01-20 NOTE — PLAN OF CARE
Nutrition Problem #1: Moderate malnutrition,In context of acute illness or injury  Intervention: Food and/or Nutrient Delivery: Continue Current Diet,Start Oral Nutrition Supplement  Nutritional Goals: Pt will tolerateat least 50% of his meals and supplement, if appropriate for GOC

## 2022-01-20 NOTE — PROGRESS NOTES
Initiated sitting with the patient as he was very confused/anxious and constantly trying to climb out of bed. Sitting with the patient for safety and to watch IV line as Heparin is running. Daughter is supposed to be coming in around 10:30am and a hospice meeting will be held at noon. Will discuss and re-evaluate with the nurse again about the need for a sitter once daughter arrives.

## 2022-01-20 NOTE — PROGRESS NOTES
Comprehensive Nutrition Assessment    Type and Reason for Visit:  Initial (los 7 d)    Nutrition Recommendations/Plan:   · Continue current diet   · Begin renal oral nutrition supplement bid, as needed/desired    Nutrition Assessment:  Pt was admitted with PE, Covid-19 and encephalopathy. H/O DM, GERD, CKD/HD. Goal of comfort care and no more aggressive therapy noted, family/hospice mtg this morning. PO intake has been poor, Pt needing assistance noted. Significant recent wt loss is noted per Epic history. Pt meets criteria for moderate malnutrition. Thomas order oral supplements and continue to follow as high nutrition risk pending hospice desision. Malnutrition Assessment:  Malnutrition Status: Moderate malnutrition    Context:  Acute Illness     Findings of the 6 clinical characteristics of malnutrition:  Energy Intake:  50% or less of estimated energy requirements for 5 or more days  Weight Loss:  Greater than 7.5% over 3 months     Body Fat Loss:  Unable to assess     Muscle Mass Loss:  Unable to assess    Fluid Accumulation:  No significant fluid accumulation     Strength:  Not Performed    Estimated Daily Nutrient Needs:  Energy (kcal):  0069-1397 (30-35 kcal/kg); Weight Used for Energy Requirements:  Current     Protein (g):  77-83 (1.2-1.3 g/kg); Weight Used for Protein Requirements:  Current        Fluid (ml/day):  6899-7462; Method Used for Fluid Requirements:  1 ml/kcal      Wounds:  None       Current Nutrition Therapies:    ADULT DIET;  Dysphagia - Minced and Moist    Anthropometric Measures:  · Height: 5' 7\" (170.2 cm)  · Current Body Weight: 141 lb 6.4 oz (64.1 kg)   · Admission Body Weight: 162 lb 0.6 oz (73.5 kg)    · Usual Body Weight: 167 lb 11.2 oz (76.1 kg) (9/8/21)     · Ideal Body Weight: 148 lbs; % Ideal Body Weight 95.5 %   · BMI: 22.1  · BMI Categories: Normal Weight (BMI 22.0 to 24.9) age over 72       Nutrition Diagnosis:   · Moderate malnutrition,In context of acute illness or injury related to impaired respiratory function,cognitive or neurological impairment as evidenced by intake 0-25%,poor intake prior to admission    Nutrition Interventions:   Food and/or Nutrient Delivery:  Continue Current Diet,Start Oral Nutrition Supplement  Nutrition Education/Counseling:  No recommendation at this time   Coordination of Nutrition Care:  Continue to monitor while inpatient,Feeding Assistance/Environment Change,Speech Therapy,Swallow Evaluation    Goals:  Pt will tolerateat least 50% of his meals and supplement, if appropriate for GOC       Nutrition Monitoring and Evaluation:   Behavioral-Environmental Outcomes:  None Identified   Food/Nutrient Intake Outcomes:  Food and Nutrient Intake,Supplement Intake  Physical Signs/Symptoms Outcomes:  Biochemical Data,Chewing or Swallowing,GI Status,Meal Time Behavior,Nutrition Focused Physical Findings,Skin,Weight     Discharge Planning:     Too soon to determine     Electronically signed by Jose Enriquez RD, LD on 1/20/22 at 11:15 AM EST    Contact: [phone]

## 2022-01-20 NOTE — PROGRESS NOTES
Stop heparin gtt recent nose bleed  Plan daughter come 1030am and hospice meeting at noon  Goal comfort care and no more aggressive therapy  Seen and examined full note to follow

## 2022-01-20 NOTE — PROGRESS NOTES
Nephrology Progress Note  1/20/2022 5:02 PM  Subjective: Interval History: Rickey Mishra is a 80 y.o. male weak sleepy in bed and I met with family at bedside    Data:   Scheduled Meds:   insulin lispro  0-12 Units SubCUTAneous TID WC    insulin lispro  0-6 Units SubCUTAneous Nightly    tamsulosin  0.4 mg Oral Daily    ARIPiprazole  5 mg Oral Daily    atorvastatin  80 mg Oral Nightly    busPIRone  5 mg Oral BID    donepezil  10 mg Oral Nightly    fluticasone  2 spray Each Nostril Daily    levothyroxine  100 mcg Oral Daily    memantine  5 mg Oral BID    midodrine  5 mg Oral BID    morphine  15 mg Oral Daily    pantoprazole  40 mg Oral Daily    QUEtiapine  75 mg Oral BID    sertraline  50 mg Oral Daily    traZODone  75 mg Oral Nightly    vitamin D  2,000 Units Oral Daily    sodium chloride flush  5-40 mL IntraVENous 2 times per day    dexamethasone  6 mg IntraVENous Q24H    levoFLOXacin  500 mg Oral Every Other Day     Continuous Infusions:   sodium chloride      sodium chloride      sodium chloride      sodium chloride      dextrose           CBC   Recent Labs     01/18/22  1615 01/19/22  0126 01/19/22  1631   WBC 6.7 8.6 11.0*   HGB 8.8* 7.8* 8.5*   HCT 28.9* 25.2* 27.4*    197 227      BMP   Recent Labs     01/18/22  0926 01/19/22  0126    140   K 3.8 4.2    101   CO2 26 26   PHOS 2.3* 2.7   BUN 56* 68*   CREATININE 3.2* 3.2*     Hepatic:   No results for input(s): AST, ALT, ALB, BILITOT, ALKPHOS in the last 72 hours. Troponin: No results for input(s): TROPONINI in the last 72 hours. BNP: No results for input(s): BNP in the last 72 hours. Lipids: No results for input(s): CHOL, HDL in the last 72 hours. Invalid input(s): LDLCALCU  ABGs:   Lab Results   Component Value Date    PO2ART 97 01/13/2022    VQU7HUO 39.0 01/13/2022     INR:   No results for input(s): INR in the last 72 hours.   Renal Labs  Albumin:    Lab Results   Component Value Date    LABALBU 4.1 01/19/2022    LABALBU 3.8 05/19/2021     Calcium:    Lab Results   Component Value Date    CALCIUM 8.8 01/19/2022     Phosphorus:    Lab Results   Component Value Date    PHOS 2.7 01/19/2022     U/A:    Lab Results   Component Value Date    NITRU NEGATIVE 01/16/2022    COLORU YELLOW 01/16/2022    WBCUA 1 01/16/2022    RBCUA 3 01/16/2022    MUCUS RARE 01/16/2022    TRICHOMONAS NONE SEEN 01/16/2022    BACTERIA RARE 01/16/2022    CLARITYU CLEAR 01/16/2022    SPECGRAV 1.021 01/16/2022    UROBILINOGEN NEGATIVE 01/16/2022    BILIRUBINUR NEGATIVE 01/16/2022    BLOODU SMALL 01/16/2022    KETUA NEGATIVE 01/16/2022     ABG:    Lab Results   Component Value Date    YVK2HJT 39.0 01/13/2022    PO2ART 97 01/13/2022    SOP7ZBV 17.1 01/13/2022     HgBA1c:    Lab Results   Component Value Date    LABA1C 7.4 05/02/2020     Microalbumen/Creatinine ratio:  No components found for: RUCREAT  TSH:  No results found for: TSH  IRON:    Lab Results   Component Value Date    IRON 42 05/04/2020     Iron Saturation:  No components found for: PERCENTFE  TIBC:    Lab Results   Component Value Date    TIBC 168 05/04/2020     FERRITIN:    Lab Results   Component Value Date    FERRITIN 1,627 01/13/2022     RPR:  No results found for: RPR  CE:  No results found for: ANATITER, CE  24 Hour Urine for Creatinine Clearance:  No components found for: CREAT4, UHRS10, UTV10      Objective:   I/O: 01/19 0701 - 01/20 0700  In: 10 [I.V.:10]  Out: -   I/O last 3 completed shifts: In: 262.1 [I.V.:10; Blood:252.1]  Out: -   No intake/output data recorded. Vitals: /61   Pulse 97   Temp 97.7 °F (36.5 °C) (Axillary)   Resp 16   Ht 5' 7\" (1.702 m)   Wt 141 lb 6.4 oz (64.1 kg)   SpO2 94%   BMI 22.15 kg/m²  {  General appearance: awake weak dressing nose  HEENT: Head: Normal, normocephalic, atraumatic.   Neck: supple, symmetrical, trachea midline  Lungs: diminished breath sounds bilaterally  Heart: S1, S2 normal  Abdomen: abnormal findings:  soft nt  Extremities: edema trace positive AV fistula  Neurologic: Mental status: alertness: arousable        Assessment and Plan:      IMP:  #1 acute renal failure versus CKD 5 versus end-stage renal disease  #2 COVID-19 pneumonia  #3 positive pulm embolism  #4 hypotension  #5 hepatitis B surface antigen positive  #6 metabolic encephalopathy  #7 metabolic acidosis    Plan     Stop all medicines and labs  Follow-up with hospice reviewed inpatient hospice care  No more dialysis continue supportive treatment  I discussed with daughter at bedside and agree with plan for inpatient hospice  Provide current care and comfort at this time         Mary Bell MD, MD

## 2022-01-20 NOTE — PLAN OF CARE
increase the pain will improve  1/19/2022 2156 by Senia Estrada RN  Outcome: Ongoing  1/19/2022 1058 by Kacy Hernandez LPN  Outcome: Ongoing  Goal: Ability to demonstrate ways to enhance comfort will improve  Description: Ability to demonstrate ways to enhance comfort will improve  1/19/2022 2156 by Senia Estrada RN  Outcome: Ongoing  1/19/2022 1058 by Kacy Hernandez LPN  Outcome: Ongoing  Goal: General experience of comfort will improve  Description: General experience of comfort will improve  1/19/2022 2156 by Senia Estrada RN  Outcome: Ongoing  1/19/2022 1058 by Kacy Hernandez LPN  Outcome: Ongoing     Problem: Airway Clearance - Ineffective  Goal: Achieve or maintain patent airway  1/19/2022 2156 by Senia Estrada RN  Outcome: Ongoing  1/19/2022 1058 by Kacy Hernandez LPN  Outcome: Ongoing     Problem: Gas Exchange - Impaired  Goal: Absence of hypoxia  1/19/2022 2156 by Senia Estrada RN  Outcome: Ongoing  1/19/2022 1058 by Kacy Hernandez LPN  Outcome: Ongoing  Goal: Promote optimal lung function  1/19/2022 2156 by Senia Estrada RN  Outcome: Ongoing  1/19/2022 1058 by Kacy Hernandez LPN  Outcome: Ongoing     Problem: Breathing Pattern - Ineffective  Goal: Ability to achieve and maintain a regular respiratory rate  1/19/2022 2156 by Senia Estrada RN  Outcome: Ongoing  1/19/2022 1058 by Kacy Hernandez LPN  Outcome: Ongoing     Problem:  Body Temperature -  Risk of, Imbalanced  Goal: Ability to maintain a body temperature within defined limits  1/19/2022 2156 by Senia Estrada RN  Outcome: Ongoing  1/19/2022 1058 by Kacy Hernandez LPN  Outcome: Ongoing  Goal: Will regain or maintain usual level of consciousness  1/19/2022 2156 by Senia Estrada RN  Outcome: Ongoing  1/19/2022 1058 by Kacy Hernandez LPN  Outcome: Ongoing  Goal: Complications related to the disease process, condition or treatment will be avoided or minimized  1/19/2022 2156 by Senia Estrada RN  Outcome: Ongoing  1/19/2022 1058 by Chino Chavez LPN  Outcome: Ongoing     Problem: Isolation Precautions - Risk of Spread of Infection  Goal: Prevent transmission of infection  1/19/2022 2156 by Pedro Braxton RN  Outcome: Ongoing  1/19/2022 1058 by Chino Chavez LPN  Outcome: Ongoing     Problem: Nutrition Deficits  Goal: Optimize nutritional status  1/19/2022 2156 by Pedro Braxton RN  Outcome: Ongoing  1/19/2022 1058 by Chion Chavez LPN  Outcome: Ongoing     Problem: Risk for Fluid Volume Deficit  Goal: Maintain normal heart rhythm  1/19/2022 2156 by Pedro Braxton RN  Outcome: Ongoing  1/19/2022 1058 by Chino Chavez LPN  Outcome: Ongoing  Goal: Maintain absence of muscle cramping  1/19/2022 2156 by Pedro Braxton RN  Outcome: Ongoing  1/19/2022 1058 by Chino Chavez LPN  Outcome: Ongoing  Goal: Maintain normal serum potassium, sodium, calcium, phosphorus, and pH  1/19/2022 2156 by Pedro Braxton RN  Outcome: Ongoing  1/19/2022 1058 by Chino Chavez LPN  Outcome: Ongoing     Problem: Loneliness or Risk for Loneliness  Goal: Demonstrate positive use of time alone when socialization is not possible  1/19/2022 2156 by Pedro Braxton RN  Outcome: Ongoing  1/19/2022 1058 by Chino Chavez LPN  Outcome: Ongoing     Problem: Fatigue  Goal: Verbalize increase energy and improved vitality  1/19/2022 2156 by Pedro Braxton RN  Outcome: Ongoing  1/19/2022 1058 by Chino Chavez LPN  Outcome: Ongoing     Problem: Patient Education: Go to Patient Education Activity  Goal: Patient/Family Education  1/19/2022 2156 by Pedro Braxton RN  Outcome: Ongoing  1/19/2022 1058 by Chino Chavez LPN  Outcome: Ongoing     Problem: Bleeding:  Goal: Will show no signs and symptoms of excessive bleeding  Description: Will show no signs and symptoms of excessive bleeding  1/19/2022 2156 by Pedro Braxton RN  Outcome: Ongoing  1/19/2022 1058 by Chino Chavez LPN  Outcome: Ongoing

## 2022-01-20 NOTE — FLOWSHEET NOTE
Nurse called to room by tech patient climbing out of bed and yelling \" he has to get up\" unable to redirect. New order given for haldol IV .

## 2022-01-20 NOTE — PROGRESS NOTES
[address] [place] [address]  Progress Note    Date: 1/20/2022  Name: Moustapha Nogueira  MRN: [id number]  YOB: 1940   Patient's PCP: Fatmata Weinstein MD  Consultants during acute care: Neurology, Nephrology, Cardiology  Nephrology: Dr Masoud Marshall care admission date: 1/13/2022     Subjective: The patient is restless, trying to get out of bed, and it took 3 of us to get him back in bed. No family are here., but the patient's daughter is on the way from [place]. The patient is delirious and provides no history. Objective:     Data reviewed 1/20/2022:  Chest X-ray  1/13/22:  Mild right basilar opacity.  Considerations include atelectasis and mild   pneumonia.       Mild cardiac silhouette enlargement.  Mild central vascular congestion.      CTA chest 1/13/22:  1. Suspected small right middle lobe segmental and subsegmental pulmonary   embolus.  Motion significantly degrades the quality of the exam.   2. Small foci of peripheral consolidation in the upper lobes.  Consider mild   COVID pneumonia. 3. Mild bibasilar atelectasis or pneumonia. 4. Splenomegaly.    5. Distended gallbladder.  Mild intra and extrahepatic biliary ductal   dilatation.      CT-scan of the brain 1/13/22:  No acute intracranial hemorrhage, mass effect, midline shift, or   hydrocephalus.  Streak artifact over the frontal lobes but no sign of an   acute territorial infarct.       Volume loss with prominence of the ventricles and sulci are stable.      CTA head and neck 1/13/22:  1.  No large vessel occlusion in the head or neck.       2.  Mild-to-moderate atherosclerotic disease.       3.  Scattered heterogeneous opacities in the visualized lung apices may   relate to an atypical infectious/inflammatory process including atypical or   viral/COVID-19 pneumonia versus mild pulmonary edema.       4.  C4-C5 osseous sclerosis and endplate erosions may relate to advanced   degenerative changes.  Underlying discitis/osteomyelitis is Neurologic: restless, mumbles      Assessment/Plan:  1. Toxic/metabolic encephalopathy with delirium. Hospice to discuss with the patient's daughter on arrival. The patient is hospice eligible, and perhaps General Inpatient Hospice eligible given active recent decline, pain, dyspnea. Will follow for plan of care. 2. COVID-19 pneumonitis, not currently on oxygen,   3. ESRD, stopping hemodialysis. The patient has gone for periods of time without hemodialysis in the past  4. Bilateral pulmonary emboli, off anticoagulation due to severe epistaxis  5. Baseline cognitive dysfunction, schizoaffective disorder  6. Epistaxis  7.  Cerebrovascular disease      Patient Active Problem List   Diagnosis Code    Altered mental status R41.82    Essential hypertension I10    Low back pain M54.50    Acquired hypothyroidism E03.9    Panic disorder F41.0    Dystonia G24.9    Chronic kidney disease (CKD) stage G3a/A3, moderately decreased glomerular filtration rate (GFR) between 45-59 mL/min/1.73 square meter and albuminuria creatinine ratio greater than 300 mg/g ([facility]) N18.31    Type 2 diabetes mellitus without complication ([facility]) C77.6    Ambulatory dysfunction R26.2    Non compliance w medication regimen Z91.14    Hyperlipidemia E78.5    Closed fracture of distal end of fibula S82.839A    Acute metabolic encephalopathy N81.12    SO (acute kidney injury) ([facility] N17.9    Encephalopathy acute G93.40    Closed displaced fracture of shaft of second metacarpal bone of right hand S62.320A    Moderate episode of recurrent major depressive disorder ([facility]) F33.1    Generalized anxiety disorder F41.1    ESRD (end stage renal disease) ([facility]) N18.6    ESRD on hemodialysis ([facility]) N18.6, Z99.2    AVF (arteriovenous fistula) ([facility]) I77.0    Hyperkalemia E87.5    Chronic kidney disease, stage IV (severe) ([facility]) N18.4    Acquired renal cyst N28.1    Allergic rhinitis J30.9    Anemia due to stage 4 chronic kidney disease ([facility] N18.4, D63.1    Balance disorder R26.89    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    Gastroesophageal reflux disease K21.9    Impaired mobility Z74.09    Insomnia due to other mental disorder F51.05, F99    Iron deficiency E61.1    Memory impairment R41.3    Microalbuminuria due to type 2 diabetes mellitus ([facility] E11.29, R80.9    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

## 2022-01-21 LAB — HEPATITIS B SURFACE ANTIGEN: NON REACTIVE

## 2022-01-21 PROCEDURE — 6360000002 HC RX W HCPCS: Performed by: FAMILY MEDICINE

## 2022-01-21 PROCEDURE — 1250000000 HC SEMI PRIVATE HOSPICE R&B

## 2022-01-21 PROCEDURE — 2580000003 HC RX 258: Performed by: FAMILY MEDICINE

## 2022-01-21 PROCEDURE — 6370000000 HC RX 637 (ALT 250 FOR IP): Performed by: FAMILY MEDICINE

## 2022-01-21 PROCEDURE — 2700000000 HC OXYGEN THERAPY PER DAY

## 2022-01-21 PROCEDURE — 94761 N-INVAS EAR/PLS OXIMETRY MLT: CPT

## 2022-01-21 RX ORDER — HALOPERIDOL 5 MG/ML
1 INJECTION INTRAMUSCULAR EVERY 8 HOURS SCHEDULED
Status: DISCONTINUED | OUTPATIENT
Start: 2022-01-21 | End: 2022-01-22

## 2022-01-21 RX ADMIN — HALOPERIDOL LACTATE 1 MG: 5 INJECTION, SOLUTION INTRAMUSCULAR at 14:52

## 2022-01-21 RX ADMIN — HYDROMORPHONE HYDROCHLORIDE 0.5 MG: 2 INJECTION, SOLUTION INTRAMUSCULAR; INTRAVENOUS; SUBCUTANEOUS at 00:26

## 2022-01-21 RX ADMIN — HYDROMORPHONE HYDROCHLORIDE 0.5 MG: 2 INJECTION, SOLUTION INTRAMUSCULAR; INTRAVENOUS; SUBCUTANEOUS at 03:39

## 2022-01-21 RX ADMIN — LORAZEPAM 1 MG: 2 INJECTION INTRAMUSCULAR at 09:56

## 2022-01-21 RX ADMIN — HYDROMORPHONE HYDROCHLORIDE 0.5 MG: 2 INJECTION, SOLUTION INTRAMUSCULAR; INTRAVENOUS; SUBCUTANEOUS at 09:55

## 2022-01-21 RX ADMIN — LORAZEPAM 1 MG: 2 INJECTION INTRAMUSCULAR at 05:57

## 2022-01-21 RX ADMIN — SODIUM CHLORIDE, PRESERVATIVE FREE 10 ML: 5 INJECTION INTRAVENOUS at 09:57

## 2022-01-21 RX ADMIN — LORAZEPAM 1 MG: 2 INJECTION INTRAMUSCULAR at 03:57

## 2022-01-21 RX ADMIN — HALOPERIDOL LACTATE 1 MG: 5 INJECTION, SOLUTION INTRAMUSCULAR at 17:26

## 2022-01-21 RX ADMIN — HALOPERIDOL LACTATE 1 MG: 5 INJECTION, SOLUTION INTRAMUSCULAR at 21:22

## 2022-01-21 RX ADMIN — SODIUM CHLORIDE, PRESERVATIVE FREE 10 ML: 5 INJECTION INTRAVENOUS at 21:23

## 2022-01-21 RX ADMIN — HALOPERIDOL LACTATE 1 MG: 5 INJECTION, SOLUTION INTRAMUSCULAR at 19:43

## 2022-01-21 RX ADMIN — QUETIAPINE FUMARATE 75 MG: 25 TABLET ORAL at 21:23

## 2022-01-21 ASSESSMENT — PAIN SCALES - GENERAL
PAINLEVEL_OUTOF10: 5
PAINLEVEL_OUTOF10: 8

## 2022-01-21 NOTE — H&P
[address] [place] [address]    Date: 1/20/2022  Name: Jolie Camacho  MRN: [id number]  YOB: 1940   Patient's PCP: Ceci Ramos MD   Consultants during acute care: Neurology, Nephrology, Cardiology  Nephrology: Dr Yoli Prieto care admission date: 1/13 to 1/20/2022   Admit to General Inpatient Hospice: 1/20/2022    Informant: Chart reviewed, discussed with Dr Bernard Rogers. I examined the patient who mumbles and does not provide any information. CC: COVID-19, nose bleed, renal failure    Tangirnaq: This is a 80 y.o. male with history of history of cognitive dysfunction, schizoaffective disorder, hypertension, hyperlipidemia, CVA, diabetes mellitus, hyperlipidemia and end-stage renal disease was admitted on 1/13/2022 from [facility] with hypoxia and unresponsiveness. Patient was saturating in the 70s in the ER, and neuroimaging did not show acute infarct. The patient was found to be COVID-19 positive on 1/13/22, CT chest showed bilateral pulmonary emboli. The patient was anticoagulated with Heparin then Apixiban, now stopped after epistaxis requiring Rhinorocket. The patient has received Dexamethasone and Levofloxacin. The patient had been off hemodialysis for a period of time prior to this admission. He has had hemodialysis this admission and has told Dr Bernard Rogers that he does not want additional dialysis and wants to be comfortable and is ready to die. The patient's daughter, Ilda Robins, is traveling from [place] on 1/20/22. The patient mumbles, and is not able to provide information to me. He is mildly restless but not combative. The patient is listed as limited code without CPR, meds, intubation or cardioversion. I have collaborated with the hospice nurse, and the patient's daughter has arrived from [place].  Luciana requests comfort focused care, no additional hemodialysis, and is agreeable to [facility], which is needed due to agitated delirium, and restlessness.  I placed discharge readmit orders.         Past Medical History:   Diagnosis Date    Anemia     Carotid stenosis     hx per old chart    CKD (chronic kidney disease)     stage 3 hx per old chart    Depression     Diabetes ([facility]     Dystonia per pt    Fracture     per old chart hx fx right hand metacarpal-(9/9/2019) scheduled for surgery 10/4/2019    GERD (gastroesophageal reflux disease)     Hyperlipidemia     Hypertension     Hypothyroidism     Metabolic encephalopathy     dx with admission 6/2018    Neuropathy     Non-smoker per pt    Poor historian     Schizoaffective disorder ([facility]     hx per old chart    Vitamin D deficiency     hx per old chart       Past Surgical History:   Procedure Laterality Date    ANKLE SURGERY Left 06/25/2016    [facility] BACK [facility]GERY      Cervical 3    COLONOSCOPY      ENDOSCOPY, COLON, DIAGNOSTIC      EYE SURGERY  09/2019    Left cataract surgery    FRACTURE SURGERY      Right Hand    HAND SURGERY Right 10/4/2019    HAND OPEN REDUCTION INTERNAL FIXATION RIGHT SECOND METACARPAL performed by Margarita Bonner DO at [address] Right     Inguinal Hernia    TONSILLECTOMY         Social History     Socioeconomic History    Marital status:      Spouse name: Not on file    Number of children: Not on file    Years of education: Not on file    Highest education level: Not on file   Occupational History    Not on file   Tobacco Use    Smoking status: Never Smoker    Smokeless tobacco: Never Used   Vaping Use    Vaping Use: Never used   Substance and Sexual Activity    Alcohol use: No     Alcohol/week: 0.0 standard drinks    Drug use: No    Sexual activity: Not on file   Other Topics Concern    Not on file   Social History Narrative    Not on file     Social Determinants of Health     Financial Resource Strain:     Difficulty of Paying Living Expenses: Not on file   Food Insecurity:     Worried About Running Out of Food in the Last Year: Not on file    Ran Out of Food in the Last Year: Not on file   Transportation Needs:     Lack of Transportation (Medical): Not on file    Lack of Transportation (Non-Medical):  Not on file   Physical Activity:     Days of Exercise per Week: Not on file    Minutes of Exercise per Session: Not on file   Stress:     Feeling of Stress : Not on file   Social Connections:     Frequency of Communication with Friends and Family: Not on file    Frequency of Social Gatherings with Friends and Family: Not on file    Attends [employer] Services: Not on file    Active Member of [address] [place] [address] [employer] or Organizations: Not on file    Attends Club or Organization Meetings: Not on file    Marital Status: Not on file   Intimate Partner Violence:     Fear of Current or Ex-Partner: Not on file    Emotionally Abused: Not on file    Physically Abused: Not on file    Sexually Abused: Not on file   Housing Stability:     Unable to Pay for Housing in the Last Year: Not on file    Number of [place] in the Last Year: Not on file    Unstable Housing in the Last Year: Not on file       Family History   Problem Relation Age of Onset    Diabetes Father     High Cholesterol Father     Heart Disease Maternal Grandmother     Heart Disease Maternal Grandfather     Stroke Maternal Grandfather     Heart Disease Paternal Grandmother     Diabetes Paternal Grandfather     Heart Disease Paternal Grandfather     High Blood Pressure Paternal Grandfather     Depression Daughter         attempted suicide       Allergies   Allergen Reactions    Amitriptyline Other (See Comments)     sedation  Other reaction(s): sedation  sedation    Codeine      States have used this with no issues  Other reaction(s): \"crazy\"  States have used this with no issues    Gabapentin      Neuro toxicity myoclonus     Suprax [Cefixime] Other (See Comments)     Pain       Medication list reviewed  Prior to Admission medications    Medication Sig Start Date End Date Taking? Authorizing Provider   morphine (MS CONTIN) 15 MG extended release tablet Take 1 tablet by mouth daily. 1/8/22   Historical Provider, MD   sertraline (ZOLOFT) 50 MG tablet Take 1 tablet by mouth daily 12/13/21   Historical Provider, MD   vitamin D (CHOLECALCIFEROL) 50 MCG (2000 UT) TABS tablet Take 1 tablet by mouth daily 12/20/21   Historical Provider, MD   ARIPiprazole (ABILIFY) 5 MG tablet Take 5 mg by mouth daily    Historical Provider, MD   acetaminophen (TYLENOL 8 HOUR) 650 MG extended release tablet Take 650 mg by mouth every 6 hours as needed for Pain    Historical Provider, MD   ammonium lactate (LAC-HYDRIN) 12 % lotion Apply topically daily Apply topically as needed. Historical Provider, MD   Multiple Vitamin (DAILY BOLA PO) Take 1 tablet by mouth daily    Historical Provider, MD   fluticasone (FLONASE) 50 MCG/ACT nasal spray 2 sprays by Each Nostril route daily    Historical Provider, MD   gabapentin (NEURONTIN) 300 MG capsule Take 300 mg by mouth 2 times daily.     Historical Provider, MD   SITagliptin (JANUVIA) 50 MG tablet Take 50 mg by mouth daily    Historical Provider, MD   insulin glargine (LANTUS) 100 UNIT/ML injection vial Inject 25 Units into the skin nightly    Historical Provider, MD   sodium zirconium cyclosilicate (LOKELMA) 10 g PACK oral suspension Take 10 g by mouth daily    Historical Provider, MD   loperamide (IMODIUM) 2 MG capsule Take 2 mg by mouth as needed for Diarrhea    Historical Provider, MD   midodrine (PROAMATINE) 5 MG tablet Take 5 mg by mouth 2 times daily    Historical Provider, MD   magnesium hydroxide (MILK OF MAGNESIA) 400 MG/5ML suspension Take by mouth 2 times daily as needed for Constipation    Historical Provider, MD   insulin aspart (NOVOLOG) 100 UNIT/ML injection vial Inject into the skin 3 times daily (before meals) Sliding scale dose    Historical Provider, MD   pantoprazole (PROTONIX) 40 MG tablet Take Last 5 Encounters:   01/20/22 141 lb 6.4 oz (64.1 kg)   09/08/21 167 lb 11.2 oz (76.1 kg)   06/09/21 167 lb 12.8 oz (76.1 kg)   05/13/21 167 lb (75.8 kg)   04/29/21 166 lb 12.8 oz (75.7 kg)       Data reviewed 1/20/2022:  Chest X-ray  1/13/22:  Mild right basilar opacity.  Considerations include atelectasis and mild   pneumonia.       Mild cardiac silhouette enlargement.  Mild central vascular congestion. CTA chest 1/13/22:  1. Suspected small right middle lobe segmental and subsegmental pulmonary   embolus.  Motion significantly degrades the quality of the exam.   2. Small foci of peripheral consolidation in the upper lobes.  Consider mild   COVID pneumonia. 3. Mild bibasilar atelectasis or pneumonia. 4. Splenomegaly. 5. Distended gallbladder.  Mild intra and extrahepatic biliary ductal   dilatation. CT-scan of the brain 1/13/22:  No acute intracranial hemorrhage, mass effect, midline shift, or   hydrocephalus.  Streak artifact over the frontal lobes but no sign of an   acute territorial infarct.       Volume loss with prominence of the ventricles and sulci are stable. CTA head and neck 1/13/22:  1.  No large vessel occlusion in the head or neck.       2.  Mild-to-moderate atherosclerotic disease.       3.  Scattered heterogeneous opacities in the visualized lung apices may   relate to an atypical infectious/inflammatory process including atypical or   viral/COVID-19 pneumonia versus mild pulmonary edema.       4.  C4-C5 osseous sclerosis and endplate erosions may relate to advanced   degenerative changes.  Underlying discitis/osteomyelitis is difficult to   entirely exclude and could be further evaluated with follow-up cervical spine   MRI. Transthoracic Echocardiogram 1/13/22:   Expedited imaging was used to obtain protocol images to reduce the   sonographer's exposure during COVID-19 pandemic.    Left ventricular systolic function is normal.   Ejection fraction is visually estimated at 50-55%. Moderate aortic regurgitation; PHT: 498 msec. Mild tricuspid regurgitation; RVSP: 30 mmHg. No evidence of any pericardial effusion. Hemoglobin A1C: no recent available  Accuchecks:   Recent Labs     01/19/22  1547 01/19/22  1950 01/20/22  0611   POCGLU 139* 155* 111*     Hepatic Function Panel:    Lab Results   Component Value Date    ALKPHOS 79 01/14/2022    ALT 28 01/14/2022    AST 60 01/14/2022    PROT 5.9 01/14/2022    PROT 7.4 08/16/2011    BILITOT 0.3 01/14/2022    BILIDIR 0.2 01/14/2022    IBILI 0.1 01/14/2022    LABALBU 4.1 01/19/2022    LABALBU 3.8 05/19/2021     CBC:   Recent Labs     01/18/22  1615 01/19/22  0126 01/19/22  1631   WBC 6.7 8.6 11.0*   HGB 8.8* 7.8* 8.5*   HCT 28.9* 25.2* 27.4*   MCV 91.2 90.0 89.3    197 227     BMP:   Recent Labs     01/18/22  0926 01/19/22  0126    140   K 3.8 4.2    101   CO2 26 26   BUN 56* 68*   CREATININE 3.2* 3.2*   GLUCOSE 95 168*       Physical Exam:   /71   Pulse 87   Temp 97.9 °F (36.6 °C) (Axillary)   Resp 15   Ht 5' 7\" (1.702 m)   Wt 141 lb 6.4 oz (64.1 kg)   SpO2 90%   BMI 22.15 kg/m²   General: restless, delirious,    HEENT: Right nasal packing in place with dried blood, mucous membranes are dry, mild conjunctival pallor   Heart: distant tones, RRR, S1S2, no murmurs  Lungs:  equal breath sounds bilaterally, diminished at the bases, without rales, rhonchi   Abdomen: soft, bowel sounds present, no apparent tenderness, nondistended  Extremities:  No mottling, left AV fistula,   Neurologic: restless, mumbles      Assessment/Plan:  1. Toxic/metabolic encephalopathy with agitated delirium due to combination of ESRD, COVID-19 viral infection with baseline cognitive dysfunction and schizoaffective disorder. Admit to [address] [place] [address] for management of delirium, pain, congestion, possible end of life care. PPS 20%.    2. COVID-19 pneumonitis, not currently on oxygen,   3.  ESRD, stopping hemodialysis. The patient has gone for periods of time without hemodialysis in the past  4. Bilateral pulmonary emboli, off anticoagulation due to severe epistaxis  5. Baseline cognitive dysfunction, schizoaffective disorder  6. Epistaxis  7.  Cerebrovascular disease      Patient Active Problem List   Diagnosis Code    Altered mental status R41.82    Essential hypertension I10    Low back pain M54.50    Acquired hypothyroidism E03.9    Panic disorder F41.0    Dystonia G24.9    Chronic kidney disease (CKD) stage G3a/A3, moderately decreased glomerular filtration rate (GFR) between 45-59 mL/min/1.73 square meter and albuminuria creatinine ratio greater than 300 mg/g ([facility]) N18.31    Type 2 diabetes mellitus without complication ([facility]) Y37.7    Ambulatory dysfunction R26.2    Non compliance w medication regimen Z91.14    Hyperlipidemia E78.5    Closed fracture of distal end of fibula S82.839A    Acute metabolic encephalopathy T26.27    SO (acute kidney injury) ([facility] N17.9    Toxic metabolic encephalopathy K26.5    Closed displaced fracture of shaft of second metacarpal bone of right hand S62.320A    Moderate episode of recurrent major depressive disorder ([facility]) F33.1    Generalized anxiety disorder F41.1    ESRD (end stage renal disease) ([facility]) N18.6    ESRD on hemodialysis ([facility]) N18.6, Z99.2    AVF (arteriovenous fistula) ([facility]) I77.0    Hyperkalemia E87.5    Chronic kidney disease, stage IV (severe) ([facility]) N18.4    Acquired renal cyst N28.1    Allergic rhinitis J30.9    Anemia due to stage 4 chronic kidney disease ([facility]) N18.4, D63.1    Balance disorder R26.89    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    Gastroesophageal reflux disease K21.9    Impaired mobility Z74.09    Insomnia due to other mental disorder F51.05, F99    Iron deficiency E61.1    Memory impairment R41.3    Microalbuminuria due to type 2 diabetes mellitus ([facility]) E11.29, R80.9    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    Schizoaffective disorder ([facility]) F25.9    Severe episode of recurrent major depressive disorder, without psychotic features ([facility] F33.2    Spinal stenosis of cervical region M48.02    Stenosis of both internal carotid arteries I65.23    Visual disturbance H53.9    Vitamin B12 deficiency E53.8    Vitamin D deficiency E55.9    Pulmonary embolism and infarction (HCC) I26.99    Acute respiratory failure (HCC) J96.00    Moderate malnutrition (HCC) E44.0    Hospice care F59.5       Certification of Terminal Illness: I certify that this patient is eligible for Hospice services for a terminal diagnosis of end stage renal disease with a life expectancy predicted to be less than 6 months if this illness follows its expected course.         Deisy Cantrell MD

## 2022-01-21 NOTE — PROGRESS NOTES
[address] [place] [address]  General Inpatient Hospice  Progress Note    Date: 1/21/2022  Name: Sophy Low  MRN: [id number]  YOB: 1940   Patient's PCP: Rajesh Martinez MD  Consultants during acute care: Neurology, Nephrology, Cardiology  Nephrology: Dr Blaze Giles care admission date: 1/13/2022   Admit to General Inpatient Hospice: 1/20/2022    Subjective: The patient is intermittently restless, moving about in the bed when I visited early morning. I collaborated with the patient's nurse and the hospice nurse. I met with the patient's daughter, Cathryn Perez ([phone]) at the bedside later morning, and he is resting comfortably after a dose of Hydromorphone. He does not appear to be able to safely take oral meds. Hospice was discussed in detail regarding comfort with the patient's daughter and son in law, Noah Campos, and questions were answered. I spent 20 minutes in discussion of [address] with family re: comfort medications, and plan of care. Objective:   Pain is managed with Hydromorphone IV prn with 2 doses in the past 24 hours, Glycopyrrolate IV is available for secretions, Haloperidol for delirium, and Lorazepam is available for anxiety. Data reviewed 1/21/2022:  Chest X-ray  1/13/22:  Mild right basilar opacity.  Considerations include atelectasis and mild   pneumonia.       Mild cardiac silhouette enlargement.  Mild central vascular congestion.      CTA chest 1/13/22:  1. Suspected small right middle lobe segmental and subsegmental pulmonary   embolus.  Motion significantly degrades the quality of the exam.   2. Small foci of peripheral consolidation in the upper lobes.  Consider mild   COVID pneumonia. 3. Mild bibasilar atelectasis or pneumonia. 4. Splenomegaly. 5. Distended gallbladder.  Mild intra and extrahepatic biliary ductal   dilatation.      CT-scan of the brain 1/13/22:  No acute intracranial hemorrhage, mass effect, midline shift, or   hydrocephalus.  Streak artifact over the frontal lobes but no sign of an   acute territorial infarct.       Volume loss with prominence of the ventricles and sulci are stable.      CTA head and neck 1/13/22:  1.  No large vessel occlusion in the head or neck.       2.  Mild-to-moderate atherosclerotic disease.       3.  Scattered heterogeneous opacities in the visualized lung apices may   relate to an atypical infectious/inflammatory process including atypical or   viral/COVID-19 pneumonia versus mild pulmonary edema.       4.  C4-C5 osseous sclerosis and endplate erosions may relate to advanced   degenerative changes.  Underlying discitis/osteomyelitis is difficult to   entirely exclude and could be further evaluated with follow-up cervical spine   MRI.      Transthoracic Echocardiogram 1/13/22:   Expedited imaging was used to obtain protocol images to reduce the   sonographer's exposure during COVID-19 pandemic.   Left ventricular systolic function is normal.   Ejection fraction is visually estimated at 50-55%.   Moderate aortic regurgitation; PHT: 498 msec.   Mild tricuspid regurgitation; RVSP: 30 mmHg.   No evidence of any pericardial effusion.     Hemoglobin A1C: no recent available        Accuchecks: Recent Labs     01/19/22  0737 01/19/22  1055 01/19/22  1547   POCGLU 108* 102* 139*      Hepatic Function Panel:          Lab Results   Component Value Date     ALKPHOS 79 01/14/2022     ALT 28 01/14/2022     AST 60 01/14/2022     PROT 5.9 01/14/2022     PROT 7.4 08/16/2011     BILITOT 0.3 01/14/2022     BILIDIR 0.2 01/14/2022     IBILI 0.1 01/14/2022     LABALBU 4.1 01/19/2022     LABALBU 3.8 05/19/2021      CBC:         Recent Labs     01/18/22  0926 01/18/22  1615 01/19/22  0126   WBC 7.2 6.7 8.6   HGB 8.9* 8.8* 7.8*   HCT 28.4* 28.9* 25.2*   MCV 89.6 91.2 90.0    176 197      BMP:         Recent Labs     01/17/22  1307 01/18/22  0926 01/19/22  0126    140 140   K 4.9 3.8 4.2    100 101   CO2 23 26 26 BUN 98* 56* 68*   CREATININE 4.6* 3.2* 3.2*   GLUCOSE 143* 95 168*        Physical Exam:   BP (!) 157/79   Pulse 90   Temp 97.9 °F (36.6 °C) (Axillary)   Resp 16   SpO2 95%   General: restless, delirious on my initial visit, and peaceful at my second visit  HEENT: mucous membranes are dry, mild conjunctival pallor   Heart: distant tones, tachycardic RRR, S1S2, no murmurs  Lungs:  equal coarse breath sounds bilaterally, diminished at the bases, without rales, rhonchi   Abdomen: soft, bowel sounds present, no apparent tenderness, nondistended  Extremities:  No mottling, left AV fistula,   Neurologic: restless, mumbles      Assessment/Plan:  1. ESRD with toxic/metabolic encephalopathy with delirium. Continue General Inpatient Hospice to manage delirium, pain, dyspnea and follow clinical course. 2. COVID-19 pneumonitis, not currently on oxygen,   3. ESRD, stopping hemodialysis. The patient has gone for periods of time without hemodialysis in the past  4. Bilateral pulmonary emboli, off anticoagulation due to severe epistaxis  5. Baseline cognitive dysfunction, schizoaffective disorder, I added scheduled Haloperidol and continue prn as he doesn't appear to be able to take the oral Abilify and Seroquel from prior to admission. 6. Epistaxis  7. Cerebrovascular disease  8. Dr. Alfie Luis will be covering the General Inpatient Hospice patients for Dr Ronna Ko beginning Saturday January 22 at 0800 until Monday, January 24 at 0800. Dr. Mansoor Snyder can be reached through [address] [place] [address]. 9.  I spent 20 minutes in discussion of [address] [place] [address] Road with family re: comfort medications, and plan of care.        Patient Active Problem List   Diagnosis Code    Altered mental status R41.82    Essential hypertension I10    Low back pain M54.50    Acquired hypothyroidism E03.9    Panic disorder F41.0    Dystonia G24.9    Chronic kidney disease (CKD) stage G3a/A3, moderately decreased glomerular filtration rate (GFR) between 45-59 mL/min/1.73 square meter and albuminuria creatinine ratio greater than 300 mg/g ([facility]) N18.31    Type 2 diabetes mellitus without complication ([facility]) O20.1    Ambulatory dysfunction R26.2    Non compliance w medication regimen Z91.14    Hyperlipidemia E78.5    Closed fracture of distal end of fibula S82.839A    Acute metabolic encephalopathy P02.95    SO (acute kidney injury) ([facility] N17.9    Toxic metabolic encephalopathy B58.1    Closed displaced fracture of shaft of second metacarpal bone of right hand S62.320A    Moderate episode of recurrent major depressive disorder ([facility]) F33.1    Generalized anxiety disorder F41.1    ESRD (end stage renal disease) ([facility]) N18.6    ESRD on hemodialysis ([facility]) N18.6, Z99.2    AVF (arteriovenous fistula) ([facility]) I77.0    Hyperkalemia E87.5    Chronic kidney disease, stage IV (severe) ([facility]) N18.4    Acquired renal cyst N28.1    Allergic rhinitis J30.9    Anemia due to stage 4 chronic kidney disease ([facility]) N18.4, D63.1    Balance disorder R26.89    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    Gastroesophageal reflux disease K21.9    Impaired mobility Z74.09    Insomnia due to other mental disorder F51.05, F99    Iron deficiency E61.1    Memory impairment R41.3    Microalbuminuria due to type 2 diabetes mellitus ([facility]) E11.29, R80.9    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    Schizoaffective disorder ([facility]) F25.9    Severe episode of recurrent major depressive disorder, without psychotic features ([facility] [address] [facility] F33.2    Spinal stenosis of cervical region M48.02    Stenosis of both internal carotid arteries I65.23    Visual disturbance H53.9    Vitamin B12 deficiency E53.8    Vitamin D deficiency E55.9    Pulmonary embolism and infarction ([facility]) I26.99    Acute respiratory failure ([facility]) J96.00    Moderate malnutrition ([facility]) E44.0    Hospice care Z51.5       RUBIO Paulson MD  1/21/2022

## 2022-01-22 PROCEDURE — 6370000000 HC RX 637 (ALT 250 FOR IP): Performed by: FAMILY MEDICINE

## 2022-01-22 PROCEDURE — 6360000002 HC RX W HCPCS: Performed by: FAMILY MEDICINE

## 2022-01-22 PROCEDURE — 1250000000 HC SEMI PRIVATE HOSPICE R&B

## 2022-01-22 PROCEDURE — 6360000002 HC RX W HCPCS: Performed by: INTERNAL MEDICINE

## 2022-01-22 PROCEDURE — 94761 N-INVAS EAR/PLS OXIMETRY MLT: CPT

## 2022-01-22 PROCEDURE — 2580000003 HC RX 258: Performed by: FAMILY MEDICINE

## 2022-01-22 PROCEDURE — 2700000000 HC OXYGEN THERAPY PER DAY

## 2022-01-22 RX ORDER — LORAZEPAM 2 MG/ML
1 INJECTION INTRAMUSCULAR
Status: DISCONTINUED | OUTPATIENT
Start: 2022-01-22 | End: 2022-01-26 | Stop reason: HOSPADM

## 2022-01-22 RX ORDER — HALOPERIDOL 5 MG/ML
4 INJECTION INTRAMUSCULAR EVERY 6 HOURS PRN
Status: DISCONTINUED | OUTPATIENT
Start: 2022-01-22 | End: 2022-01-26 | Stop reason: HOSPADM

## 2022-01-22 RX ORDER — LORAZEPAM 2 MG/ML
2 INJECTION INTRAMUSCULAR
Status: DISCONTINUED | OUTPATIENT
Start: 2022-01-22 | End: 2022-01-26 | Stop reason: HOSPADM

## 2022-01-22 RX ORDER — HALOPERIDOL 5 MG/ML
4 INJECTION INTRAMUSCULAR EVERY 6 HOURS PRN
Status: DISCONTINUED | OUTPATIENT
Start: 2022-01-22 | End: 2022-01-22

## 2022-01-22 RX ORDER — QUETIAPINE FUMARATE 25 MG/1
75 TABLET, FILM COATED ORAL
Status: DISCONTINUED | OUTPATIENT
Start: 2022-01-22 | End: 2022-01-26 | Stop reason: HOSPADM

## 2022-01-22 RX ADMIN — QUETIAPINE FUMARATE 75 MG: 25 TABLET ORAL at 09:40

## 2022-01-22 RX ADMIN — HALOPERIDOL LACTATE 1 MG: 5 INJECTION, SOLUTION INTRAMUSCULAR at 06:05

## 2022-01-22 RX ADMIN — HALOPERIDOL LACTATE 1 MG: 5 INJECTION, SOLUTION INTRAMUSCULAR at 15:26

## 2022-01-22 RX ADMIN — SODIUM CHLORIDE, PRESERVATIVE FREE 10 ML: 5 INJECTION INTRAVENOUS at 09:40

## 2022-01-22 RX ADMIN — SERTRALINE HYDROCHLORIDE 50 MG: 50 TABLET ORAL at 09:40

## 2022-01-22 RX ADMIN — LORAZEPAM 1 MG: 2 INJECTION INTRAMUSCULAR; INTRAVENOUS at 19:37

## 2022-01-22 RX ADMIN — HYDROMORPHONE HYDROCHLORIDE 0.5 MG: 2 INJECTION, SOLUTION INTRAMUSCULAR; INTRAVENOUS; SUBCUTANEOUS at 19:46

## 2022-01-22 RX ADMIN — LORAZEPAM 1 MG: 2 INJECTION INTRAMUSCULAR at 16:45

## 2022-01-22 RX ADMIN — HALOPERIDOL LACTATE 1 MG: 5 INJECTION, SOLUTION INTRAMUSCULAR at 03:44

## 2022-01-22 RX ADMIN — ARIPIPRAZOLE 5 MG: 5 TABLET ORAL at 09:40

## 2022-01-22 RX ADMIN — LEVOTHYROXINE SODIUM 100 MCG: 100 TABLET ORAL at 06:05

## 2022-01-22 RX ADMIN — SODIUM CHLORIDE, PRESERVATIVE FREE 10 ML: 5 INJECTION INTRAVENOUS at 22:44

## 2022-01-22 RX ADMIN — HALOPERIDOL LACTATE 4 MG: 5 INJECTION, SOLUTION INTRAMUSCULAR at 22:42

## 2022-01-22 RX ADMIN — HALOPERIDOL LACTATE 1 MG: 5 INJECTION, SOLUTION INTRAMUSCULAR at 09:40

## 2022-01-22 ASSESSMENT — PAIN SCALES - GENERAL
PAINLEVEL_OUTOF10: 0
PAINLEVEL_OUTOF10: 3

## 2022-01-22 NOTE — PLAN OF CARE
Problem: Falls - Risk of:  Goal: Will remain free from falls  Description: Will remain free from falls  1/22/2022 1130 by Sharad Valdez LPN  Outcome: Ongoing  1/22/2022 1130 by Sharad Valdez LPN  Outcome: Ongoing  Goal: Absence of physical injury  Description: Absence of physical injury  1/22/2022 1130 by Sharad Valdez LPN  Outcome: Ongoing  1/22/2022 1130 by Sharad Valdez LPN  Outcome: Ongoing     Problem: Pain:  Goal: Pain level will decrease  Description: Pain level will decrease  1/22/2022 1130 by Sharad Valdez LPN  Outcome: Ongoing  1/22/2022 1130 by Sharad Valdez LPN  Outcome: Ongoing  Goal: Control of acute pain  Description: Control of acute pain  1/22/2022 1130 by Sharad Valdez LPN  Outcome: Ongoing  1/22/2022 1130 by Sharad Valdez LPN  Outcome: Ongoing  Goal: Control of chronic pain  Description: Control of chronic pain  1/22/2022 1130 by Sharad Valdez LPN  Outcome: Ongoing  1/22/2022 1130 by Sharad Valdez LPN  Outcome: Ongoing     Problem: Sensory:  Goal: Ability to identify factors that increase the pain will improve  Description: Ability to identify factors that increase the pain will improve  1/22/2022 1130 by Sharad Valdez LPN  Outcome: Ongoing  1/22/2022 1130 by Sharad Valdez LPN  Outcome: Ongoing  Goal: Ability to demonstrate ways to enhance comfort will improve  Description: Ability to demonstrate ways to enhance comfort will improve  1/22/2022 1130 by Sharad Valdez LPN  Outcome: Ongoing  1/22/2022 1130 by Sharad Valdez LPN  Outcome: Ongoing  Goal: General experience of comfort will improve  Description: General experience of comfort will improve  1/22/2022 1130 by Sharad Valdez LPN  Outcome: Ongoing  1/22/2022 1130 by Sharad Valdez LPN  Outcome: Ongoing  Goal: Expressions of feelings of enhanced comfort will increase  Description: Expressions of feelings of enhanced comfort will increase  Outcome: Ongoing     Problem: Coping:  Goal: Ability to participate in care decisions during the dying process will improve  Description: Ability to participate in care decisions during the dying process will improve  Outcome: Ongoing  Goal: Family's ability to cope with current situation will improve  Description: Family's ability to cope with current situation will improve  Outcome: Ongoing     Problem: Health Behavior:  Goal: Identification of resources available to assist in meeting health care needs will improve  Description: Identification of resources available to assist in meeting health care needs will improve  Outcome: Ongoing     Problem: Physical Regulation:  Goal: Complications related to the disease process, condition or treatment will be avoided or minimized  Description: Complications related to the disease process, condition or treatment will be avoided or minimized  Outcome: Ongoing     Problem: Respiratory:  Goal: Verbalizations of increased ease of respirations will increase  Description: Verbalizations of increased ease of respirations will increase  Outcome: Ongoing     Problem: Role Relationship:  Goal: The number of positive interactions the caregiver has with the patient will increase  Description: The number of positive interactions the caregiver has with the patient will increase  Outcome: Ongoing     Problem: Skin Integrity:  Goal: Risk for impaired skin integrity will decrease  Description: Risk for impaired skin integrity will decrease  Outcome: Ongoing

## 2022-01-22 NOTE — PROGRESS NOTES
[facility] Inpatient Hospice Progress Note    Date: 1/22/2022  Name: Jayleen Lynn  MRN: [id number]  YOB: 1940   Patient's PCP: Sole Wolfe MD  Admit Date: 1/20/2022 to [facility]  Consultants during acute care: Neurology, Nephrology, Cardiology  Nephrology: Dr Carlos Lorenzo  Acute care admission date: 1/13/2022       Subjective: The patient is frequently restless, moving about in the bed  and stating 'help me'. He also calls for his dtr Luciana and does not recognize she is right here at the bedside. I collaborated with the patient's nurse and the hospice nurse. He has not eaten in days and only takes some sips. He does not appear to be able to safely take oral meds. .      Objective:   Pain is managed with Hydromorphone IV prn with 2 doses in the past 24 hours, Glycopyrrolate IV is available for secretions, Haloperidol for delirium is not effective with q 8 atc plus 2 prns, and Lorazepam is available for anxiety.       Data reviewed 1/21/2022:  Chest X-ray  1/13/22:  Mild right basilar opacity.  Considerations include atelectasis and mild   pneumonia.       Mild cardiac silhouette enlargement.  Mild central vascular congestion.      CTA chest 1/13/22:  1. Suspected small right middle lobe segmental and subsegmental pulmonary   embolus.  Motion significantly degrades the quality of the exam.   2. Small foci of peripheral consolidation in the upper lobes.  Consider mild   COVID pneumonia. 3. Mild bibasilar atelectasis or pneumonia. 4. Splenomegaly.    5. Distended gallbladder.  Mild intra and extrahepatic biliary ductal   dilatation.      CT-scan of the brain 1/13/22:  No acute intracranial hemorrhage, mass effect, midline shift, or   hydrocephalus.  Streak artifact over the frontal lobes but no sign of an   acute territorial infarct.       Volume loss with prominence of the ventricles and sulci are stable.      CTA head and neck 1/13/22:  1.  No large vessel occlusion in the head or neck.       2.  Mild-to-moderate atherosclerotic disease.       3.  Scattered heterogeneous opacities in the visualized lung apices may   relate to an atypical infectious/inflammatory process including atypical or   viral/COVID-19 pneumonia versus mild pulmonary edema.       4.  C4-C5 osseous sclerosis and endplate erosions may relate to advanced   degenerative changes.  Underlying discitis/osteomyelitis is difficult to   entirely exclude and could be further evaluated with follow-up cervical spine   MRI.      Transthoracic Echocardiogram 1/13/22:   Expedited imaging was used to obtain protocol images to reduce the   sonographer's exposure during COVID-19 pandemic.   Left ventricular systolic function is normal.   Ejection fraction is visually estimated at 50-55%.   Moderate aortic regurgitation; PHT: 498 msec.   Mild tricuspid regurgitation; RVSP: 30 mmHg.   No evidence of any pericardial effusion.     Hemoglobin A1C: no recent available            Accuchecks: Recent Labs     01/19/22  0737 01/19/22  1055 01/19/22  1547   POCGLU 108* 102* 139*      Hepatic Function Panel:              Lab Results   Component Value Date     ALKPHOS 79 01/14/2022     ALT 28 01/14/2022     AST 60 01/14/2022     PROT 5.9 01/14/2022     PROT 7.4 08/16/2011     BILITOT 0.3 01/14/2022     BILIDIR 0.2 01/14/2022     IBILI 0.1 01/14/2022     LABALBU 4.1 01/19/2022     LABALBU 3.8 05/19/2021      CBC:             Recent Labs     01/18/22  0926 01/18/22  1615 01/19/22  0126   WBC 7.2 6.7 8.6   HGB 8.9* 8.8* 7.8*   HCT 28.4* 28.9* 25.2*   MCV 89.6 91.2 90.0    176 197      BMP:             Recent Labs     01/17/22  1307 01/18/22  0926 01/19/22  0126    140 140   K 4.9 3.8 4.2    100 101   CO2 23 26 26   BUN 98* 56* 68*   CREATININE 4.6* 3.2* 3.2*   GLUCOSE 143* 95 168*       Physical Exam:   BP (!) 155/75   Pulse 121   Temp 97.8 °F (36.6 °C) (Oral)   Resp 14   SpO2 100% General: restless, delirious on my initial visit, and peaceful at my second visit  HEENT: mucous membranes are dry, mild conjunctival pallor   Heart: distant tones, tachycardic RRR, S1S2, gr 2-3 RESHMA best upper sternal borders  Lungs:  equal coarse breath sounds bilaterally, diminished at the bases, without rales, rhonchi   Abdomen: soft, bowel sounds present, no apparent tenderness, nondistended  Extremities:  No mottling, left AV fistula with excellent thrill,   Skin- warm and dry, feet cool. Neurologic: restless, mumbles and yells help me. Unable to be reoriented or redirected. Repeatedly trying to get out of bed and asking to go home.     Assessment/Plan:  1. ESRD with toxic/metabolic encephalopathy with delirium. Continue General Inpatient Hospice to manage delirium, pain, dyspnea and follow clinical course.    2. COVID-19 pneumonitis, not currently on oxygen,   3. ESRD, stopping hemodialysis. The patient has gone for periods of time without hemodialysis in the past  4. Bilateral pulmonary emboli, off anticoagulation due to severe epistaxis  5. Baseline cognitive dysfunction, schizoaffective disorder, I added scheduled Haloperidol and continue prn as he doesn't appear to be able to take the oral Abilify and Seroquel from prior to admission. 6. Epistaxis  7. Cerebrovascular disease  8. Goals of Care/ACP- discussed pt's expressed wishes to stop dialysis. Dtr notes he has wanted to stop before but family pressured him. If goal is comfort and not life prolongation, and pt us unable to be both comfortable and awake, then dtr agrees comfortable is preferable to awake and seems to be what pt would want. Therefore will adjust sedatives as already on Ability and seroquel and haldol not sedating enough it appears. 9. Delirium with agitation already on antipsychotics due to schizoaffective disorder.   Will try seroquel higher doses, may need thorazine for IV or high doses of ativan or phenobarbital.  Since has delirium with risk of paradoxial to ativan will try more sedating antipsychotic first.  10. Dr. Pepe Bella will be covering the General Inpatient Hospice patients for Dr Ani Pope beginning Saturday January 22 at 0800 until Monday, January 24 at 0800. Dr. Dale Mccallum can be reached through [address] [place] [address].           Patient Active Problem List   Diagnosis Code    Altered mental status R41.82    Essential hypertension I10    Low back pain M54.50    Acquired hypothyroidism E03.9    Panic disorder F41.0    Dystonia G24.9    Chronic kidney disease (CKD) stage G3a/A3, moderately decreased glomerular filtration rate (GFR) between 45-59 mL/min/1.73 square meter and albuminuria creatinine ratio greater than 300 mg/g ([facility]) N18.31    Type 2 diabetes mellitus without complication ([facility]) H24.3    Ambulatory dysfunction R26.2    Non compliance w medication regimen Z91.14    Hyperlipidemia E78.5    Closed fracture of distal end of fibula S82.839A    Acute metabolic encephalopathy Q84.07    SO (acute kidney injury) ([facility] N17.9    Toxic metabolic encephalopathy Z28.5    Closed displaced fracture of shaft of second metacarpal bone of right hand S62.320A    Moderate episode of recurrent major depressive disorder ([facility]) F33.1    Generalized anxiety disorder F41.1    ESRD (end stage renal disease) ([facility]) N18.6    ESRD on hemodialysis ([facility]) N18.6, Z99.2    AVF (arteriovenous fistula) ([facility]) I77.0    Hyperkalemia E87.5    Chronic kidney disease, stage IV (severe) ([facility]) N18.4    Acquired renal cyst N28.1    Allergic rhinitis J30.9    Anemia due to stage 4 chronic kidney disease ([facility]) N18.4, D63.1    Balance disorder R26.89    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    Gastroesophageal reflux disease K21.9    Impaired mobility Z74.09    Insomnia due to other mental disorder F51.05, F99    Iron deficiency E61.1    Memory impairment R41.3    Microalbuminuria due to type 2 diabetes mellitus ([facility]) E11.29, R80.9    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

## 2022-01-23 PROCEDURE — 94761 N-INVAS EAR/PLS OXIMETRY MLT: CPT

## 2022-01-23 PROCEDURE — 6360000002 HC RX W HCPCS: Performed by: FAMILY MEDICINE

## 2022-01-23 PROCEDURE — 2580000003 HC RX 258: Performed by: FAMILY MEDICINE

## 2022-01-23 PROCEDURE — 6360000002 HC RX W HCPCS: Performed by: INTERNAL MEDICINE

## 2022-01-23 PROCEDURE — 1250000000 HC SEMI PRIVATE HOSPICE R&B

## 2022-01-23 PROCEDURE — 6370000000 HC RX 637 (ALT 250 FOR IP): Performed by: FAMILY MEDICINE

## 2022-01-23 PROCEDURE — 6370000000 HC RX 637 (ALT 250 FOR IP): Performed by: INTERNAL MEDICINE

## 2022-01-23 RX ORDER — QUETIAPINE FUMARATE 100 MG/1
100 TABLET, FILM COATED ORAL EVERY 6 HOURS
Status: DISCONTINUED | OUTPATIENT
Start: 2022-01-23 | End: 2022-01-26 | Stop reason: HOSPADM

## 2022-01-23 RX ADMIN — METOPROLOL TARTRATE 12.5 MG: 25 TABLET, FILM COATED ORAL at 22:50

## 2022-01-23 RX ADMIN — HALOPERIDOL LACTATE 4 MG: 5 INJECTION, SOLUTION INTRAMUSCULAR at 20:37

## 2022-01-23 RX ADMIN — QUETIAPINE FUMARATE 100 MG: 100 TABLET ORAL at 11:20

## 2022-01-23 RX ADMIN — HALOPERIDOL LACTATE 4 MG: 5 INJECTION, SOLUTION INTRAMUSCULAR at 07:03

## 2022-01-23 RX ADMIN — SODIUM CHLORIDE, PRESERVATIVE FREE 10 ML: 5 INJECTION INTRAVENOUS at 11:20

## 2022-01-23 RX ADMIN — QUETIAPINE FUMARATE 100 MG: 100 TABLET ORAL at 23:30

## 2022-01-23 RX ADMIN — SODIUM CHLORIDE, PRESERVATIVE FREE 10 ML: 5 INJECTION INTRAVENOUS at 22:56

## 2022-01-23 RX ADMIN — LORAZEPAM 2 MG: 2 INJECTION INTRAMUSCULAR at 01:21

## 2022-01-23 RX ADMIN — LORAZEPAM 2 MG: 2 INJECTION INTRAMUSCULAR at 04:22

## 2022-01-23 RX ADMIN — LORAZEPAM 2 MG: 2 INJECTION INTRAMUSCULAR at 22:53

## 2022-01-23 RX ADMIN — ARIPIPRAZOLE 5 MG: 5 TABLET ORAL at 11:20

## 2022-01-23 RX ADMIN — QUETIAPINE FUMARATE 100 MG: 100 TABLET ORAL at 17:22

## 2022-01-23 RX ADMIN — METOPROLOL TARTRATE 12.5 MG: 25 TABLET, FILM COATED ORAL at 11:20

## 2022-01-23 RX ADMIN — HYDROMORPHONE HYDROCHLORIDE 0.5 MG: 2 INJECTION, SOLUTION INTRAMUSCULAR; INTRAVENOUS; SUBCUTANEOUS at 04:22

## 2022-01-23 ASSESSMENT — PAIN SCALES - GENERAL
PAINLEVEL_OUTOF10: 0
PAINLEVEL_OUTOF10: 0

## 2022-01-23 NOTE — PLAN OF CARE
Problem: Falls - Risk of:  Goal: Will remain free from falls  Description: Will remain free from falls  Outcome: Ongoing  Goal: Absence of physical injury  Description: Absence of physical injury  Outcome: Ongoing     Problem: Pain:  Goal: Pain level will decrease  Description: Pain level will decrease  Outcome: Ongoing  Goal: Control of acute pain  Description: Control of acute pain  Outcome: Ongoing  Goal: Control of chronic pain  Description: Control of chronic pain  Outcome: Ongoing     Problem: Sensory:  Goal: Ability to identify factors that increase the pain will improve  Description: Ability to identify factors that increase the pain will improve  Outcome: Ongoing  Goal: Ability to demonstrate ways to enhance comfort will improve  Description: Ability to demonstrate ways to enhance comfort will improve  Outcome: Ongoing  Goal: General experience of comfort will improve  Description: General experience of comfort will improve  Outcome: Ongoing  Goal: Expressions of feelings of enhanced comfort will increase  Description: Expressions of feelings of enhanced comfort will increase  Outcome: Ongoing     Problem: Coping:  Goal: Ability to participate in care decisions during the dying process will improve  Description: Ability to participate in care decisions during the dying process will improve  Outcome: Ongoing  Goal: Family's ability to cope with current situation will improve  Description: Family's ability to cope with current situation will improve  Outcome: Ongoing     Problem: Health Behavior:  Goal: Identification of resources available to assist in meeting health care needs will improve  Description: Identification of resources available to assist in meeting health care needs will improve  Outcome: Ongoing     Problem: Physical Regulation:  Goal: Complications related to the disease process, condition or treatment will be avoided or minimized  Description: Complications related to the disease process, condition or treatment will be avoided or minimized  Outcome: Ongoing     Problem: Respiratory:  Goal: Verbalizations of increased ease of respirations will increase  Description: Verbalizations of increased ease of respirations will increase  Outcome: Ongoing     Problem: Role Relationship:  Goal: The number of positive interactions the caregiver has with the patient will increase  Description: The number of positive interactions the caregiver has with the patient will increase  Outcome: Ongoing     Problem: Skin Integrity:  Goal: Risk for impaired skin integrity will decrease  Description: Risk for impaired skin integrity will decrease  Outcome: Ongoing  Goal: Will show no infection signs and symptoms  Description: Will show no infection signs and symptoms  Outcome: Ongoing  Goal: Absence of new skin breakdown  Description: Absence of new skin breakdown  Outcome: Ongoing

## 2022-01-23 NOTE — PROGRESS NOTES
[facility]patient [facility] Progress Note    Date: 1/23/2022  Name: Teresa Chappell  MRN: [id number]  YOB: 1940   Patient's PCP: Gabby Nguyen MD  Admit Date: 1/20/2022 to [facility]  Consultants during acute care: Neurology, Nephrology, Cardiology  Nephrology: Dr Carlos Lorenzo  Acute care admission date: 1/13/2022         Subjective: The patient is frequently restless, moving about in the bed  and stating 'help me' or 'I want to get up'. He does not answer other questions about pain or sob that I can discern.   I collaborated with the patient's nurse who notes pt did not have seroquel last night due to deeply sleeping and unable to swallow. She feels ativan at higher dose is effective when given and has not seen paradoxical. He has not eaten in days and only takes some sips.      Objective:   Pain/sob is managed with Hydromorphone IV prn with 2 doses in the past 24 hours, Glycopyrrolate IV is available for secretions, Haloperidol for delirium with 2 prns, and Lorazepam is available for anxiety/agitation with 3 prns.       Data reviewed 1/21/2022:  Chest X-ray  1/13/22:  Mild right basilar opacity.  Considerations include atelectasis and mild   pneumonia.       Mild cardiac silhouette enlargement.  Mild central vascular congestion.      CTA chest 1/13/22:  1. Suspected small right middle lobe segmental and subsegmental pulmonary   embolus.  Motion significantly degrades the quality of the exam.   2. Small foci of peripheral consolidation in the upper lobes.  Consider mild   COVID pneumonia. 3. Mild bibasilar atelectasis or pneumonia. 4. Splenomegaly.    5. Distended gallbladder.  Mild intra and extrahepatic biliary ductal   dilatation.      CT-scan of the brain 1/13/22:  No acute intracranial hemorrhage, mass effect, midline shift, or   hydrocephalus.  Streak artifact over the frontal lobes but no sign of an   acute territorial infarct.       Volume loss with prominence of the ventricles and sulci are stable.      CTA head and neck 1/13/22:  1.  No large vessel occlusion in the head or neck.       2.  Mild-to-moderate atherosclerotic disease.       3.  Scattered heterogeneous opacities in the visualized lung apices may   relate to an atypical infectious/inflammatory process including atypical or   viral/COVID-19 pneumonia versus mild pulmonary edema.       4.  C4-C5 osseous sclerosis and endplate erosions may relate to advanced   degenerative changes.  Underlying discitis/osteomyelitis is difficult to   entirely exclude and could be further evaluated with follow-up cervical spine   MRI.      Transthoracic Echocardiogram 1/13/22:   Expedited imaging was used to obtain protocol images to reduce the   sonographer's exposure during COVID-19 pandemic.   Left ventricular systolic function is normal.   Ejection fraction is visually estimated at 50-55%.   Moderate aortic regurgitation; PHT: 498 msec.   Mild tricuspid regurgitation; RVSP: 30 mmHg.   No evidence of any pericardial effusion.     Hemoglobin A1C: no recent available            Accuchecks: Recent Labs     01/19/22  0737 01/19/22  1055 01/19/22  1547   POCGLU 108* 102* 139*      Hepatic Function Panel:              Lab Results   Component Value Date     ALKPHOS 79 01/14/2022     ALT 28 01/14/2022     AST 60 01/14/2022     PROT 5.9 01/14/2022     PROT 7.4 08/16/2011     BILITOT 0.3 01/14/2022     BILIDIR 0.2 01/14/2022     IBILI 0.1 01/14/2022     LABALBU 4.1 01/19/2022     LABALBU 3.8 05/19/2021      CBC:             Recent Labs     01/18/22  0926 01/18/22  1615 01/19/22  0126   WBC 7.2 6.7 8.6   HGB 8.9* 8.8* 7.8*   HCT 28.4* 28.9* 25.2*   MCV 89.6 91.2 90.0    176 197      BMP:             Recent Labs     01/17/22  1307 01/18/22  0926 01/19/22  0126    140 140   K 4.9 3.8 4.2    100 101   CO2 23 26 26   BUN 98* 56* 68*   CREATININE 4.6* 3.2* 3.2*   GLUCOSE 143* 95 168*          Physical Exam: BP (!) 154/82   Pulse 102   Temp 97.3 °F (36.3 °C) (Oral)   Resp 16   SpO2 92%   General: restless, and trying to get OOB and repeatedly asking to get up or saying help me. HEENT: mucous membranes are dry, mild conjunctival pallor   Heart: distant tones, tachycardic RRR, S1S2, gr 2-3 continuous murmer likely  AR maybe some AS, best upper sternal borders  Lungs:  equal coarse breath sounds bilaterally, diminished at the bases, without rales, rhonchi   Abdomen: soft, bowel sounds present, no apparent tenderness, nondistended  Extremities:  No mottling, left AV fistula with excellent thrill   Skin- warm and dry, feet cool. Neurologic: restless, mumbles and says help me. Unable to be reoriented or redirected. Repeatedly trying to get out of bed and asking to get up. Improved after adjusted in bed to sitting up.     Assessment/Plan:  1. ESRD with toxic/metabolic encephalopathy with delirium. Continue General Inpatient Hospice to manage delirium, pain, dyspnea and follow clinical course.    2. COVID-19 not currently on oxygen, Omicron with fewer pulmonary symptoms but likely contributing to overall decline and decreased intake. 3. ESRD, stopping hemodialysis. The patient has gone for periods of time without hemodialysis in the past  4. Bilateral pulmonary emboli, off anticoagulation due to severe epistaxis  5. Baseline cognitive dysfunction, schizoaffective disorder,  on long term antipsychotics. Tapered zoloft. 6. Epistaxis  7. Cerebrovascular disease  8. Goals of Care/ACP-  Met with dtr Luciana and her  in room on Saturday. discussed pt's expressed wishes to stop dialysis. Dtr notes he has wanted to stop before but family pressured him. If goal is comfort and not life prolongation, and pt us unable to be both comfortable and awake, then dtr agrees comfortable is preferable to awake and seems to be what pt would want.   Therefore will adjust sedatives as already on Ability and seroquel and haldol with this combination not sedating enough it appears. 9. Delirium with agitation already on antipsychotics due to schizoaffective disorder. Repeated agitation on haldol 1mg q 8 plus bid seroquel moderate dose. Trying seroquel higher doses which appears effective when given but then pt sedated and missing subsequent doses. Ideally would be given rectally but this institution does not give by this route. Will lower seroquel and see if we can find a happy compromise between sedation and swallowing, may end up needing repeated sedation with ativan at sedating doses. 8. Dr. Christophe Rodriguez will be covering the General Inpatient Hospice patients for Dr Jeffery Henry beginning Saturday January 22 at 0800 until Monday, January 24 at 0800.  Dr. Streeter [facility] can be reached through [employer].         Patient Active Problem List   Diagnosis Code    Altered mental status R41.82    Essential hypertension I10    Low back pain M54.50    Acquired hypothyroidism E03.9    Panic disorder F41.0    Dystonia G24.9    Chronic kidney disease (CKD) stage G3a/A3, moderately decreased glomerular filtration rate (GFR) between 45-59 mL/min/1.73 square meter and albuminuria creatinine ratio greater than 300 mg/g ([facility]) N18.31    Type 2 diabetes mellitus without complication ([facility]) H39.2    Ambulatory dysfunction R26.2    Non compliance w medication regimen Z91.14    Hyperlipidemia E78.5    Closed fracture of distal end of fibula S82.839A    Acute metabolic encephalopathy Z74.13    SO (acute kidney injury) ([facility] N17.9    Toxic metabolic encephalopathy X30.3    Closed displaced fracture of shaft of second metacarpal bone of right hand S62.320A    Moderate episode of recurrent major depressive disorder ([facility]) F33.1    Generalized anxiety disorder F41.1    ESRD (end stage renal disease) ([facility]) N18.6    ESRD on hemodialysis ([facility]) N18.6, Z99.2    AVF (arteriovenous fistula) ([facility]) I77.0    Hyperkalemia E87.5    Chronic kidney disease, stage IV (severe) (HCC) N18.4    Acquired renal cyst N28.1    Allergic rhinitis J30.9    Anemia due to stage 4 chronic kidney disease (HCC) N18.4, D63.1    Balance disorder R26.89    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    Gastroesophageal reflux disease K21.9    Impaired mobility Z74.09    Insomnia due to other mental disorder F51.05, F99    Iron deficiency E61.1    Memory impairment R41.3    Microalbuminuria due to type 2 diabetes mellitus (HCC) E11.29, R80.9    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

## 2022-01-24 PROCEDURE — 6360000002 HC RX W HCPCS: Performed by: FAMILY MEDICINE

## 2022-01-24 PROCEDURE — 6370000000 HC RX 637 (ALT 250 FOR IP): Performed by: FAMILY MEDICINE

## 2022-01-24 PROCEDURE — 94761 N-INVAS EAR/PLS OXIMETRY MLT: CPT

## 2022-01-24 PROCEDURE — 6370000000 HC RX 637 (ALT 250 FOR IP): Performed by: INTERNAL MEDICINE

## 2022-01-24 PROCEDURE — 1250000000 HC SEMI PRIVATE HOSPICE R&B

## 2022-01-24 PROCEDURE — 2580000003 HC RX 258: Performed by: FAMILY MEDICINE

## 2022-01-24 PROCEDURE — 6360000002 HC RX W HCPCS: Performed by: INTERNAL MEDICINE

## 2022-01-24 RX ADMIN — SODIUM CHLORIDE, PRESERVATIVE FREE 10 ML: 5 INJECTION INTRAVENOUS at 22:55

## 2022-01-24 RX ADMIN — LEVOTHYROXINE SODIUM 100 MCG: 100 TABLET ORAL at 06:01

## 2022-01-24 RX ADMIN — SERTRALINE HYDROCHLORIDE 25 MG: 50 TABLET ORAL at 11:46

## 2022-01-24 RX ADMIN — METOPROLOL TARTRATE 12.5 MG: 25 TABLET, FILM COATED ORAL at 22:54

## 2022-01-24 RX ADMIN — HALOPERIDOL LACTATE 4 MG: 5 INJECTION, SOLUTION INTRAMUSCULAR at 19:05

## 2022-01-24 RX ADMIN — SODIUM CHLORIDE, PRESERVATIVE FREE 10 ML: 5 INJECTION INTRAVENOUS at 11:39

## 2022-01-24 RX ADMIN — QUETIAPINE FUMARATE 100 MG: 100 TABLET ORAL at 06:01

## 2022-01-24 RX ADMIN — QUETIAPINE FUMARATE 100 MG: 100 TABLET ORAL at 11:47

## 2022-01-24 RX ADMIN — HYDROMORPHONE HYDROCHLORIDE 0.5 MG: 2 INJECTION, SOLUTION INTRAMUSCULAR; INTRAVENOUS; SUBCUTANEOUS at 22:55

## 2022-01-24 RX ADMIN — METOPROLOL TARTRATE 12.5 MG: 25 TABLET, FILM COATED ORAL at 11:47

## 2022-01-24 RX ADMIN — QUETIAPINE FUMARATE 100 MG: 100 TABLET ORAL at 22:54

## 2022-01-24 RX ADMIN — QUETIAPINE FUMARATE 100 MG: 100 TABLET ORAL at 17:31

## 2022-01-24 RX ADMIN — ARIPIPRAZOLE 5 MG: 5 TABLET ORAL at 11:47

## 2022-01-24 ASSESSMENT — PAIN SCALES - GENERAL
PAINLEVEL_OUTOF10: 0
PAINLEVEL_OUTOF10: 0

## 2022-01-24 ASSESSMENT — PAIN SCALES - WONG BAKER: WONGBAKER_NUMERICALRESPONSE: 0

## 2022-01-24 NOTE — CARE COORDINATION
LSW was informed by Dr. Silvino Jean that pt may need to return to [address] [place] [address] with Hospice. LSW called Alexis Carrillo with [facility] and left a message. LSW spoke with pt dght regarding what Dr. Silvino Jean had informed this LSW about pt returning to [address] [place] [address]. Pt dght stated she spoke with  Dr. Maribel Flores and he feel pt should stay here. LSW explained that Dr. Silvino Jean is the one that can approve that and pt has to meet the criteria to say GIP. Pt dght informed this LSW if pt can not stay here they do not want pt to return to [facility]. Pt dght requested to talk with Dr. Silvino Jean. While this LSW was talking with dght Dr. Geryl Belton called her on her phone. LSW will wait to make anymore discharge plans.

## 2022-01-24 NOTE — PROGRESS NOTES
[address] [place] [address]      I talked with the patient's daughter, Latonia Larkin at [phone], and son in law, Luis Enrique Peres, by phone after talking with the patient's nurse. They acknowledge that the patient is much more restful today with the medication changes. We discussed alternate plans if the patient stabilizes, and Luciana does not want to return to [facility]. We briefly discussed the Hospice [facility] inpatient unit as an option. For now, the plan is to continue General Inpatient Hospice here and monitor.     Melani Ruggiero MD

## 2022-01-24 NOTE — PROGRESS NOTES
[address] [place] [address]  General Inpatient Hospice Progress Note    Date: 1/24/2022  Name: Kathrine Curtis  MRN: [id number]  YOB: 1940   Patient's PCP: Shane Sandifer, MD  Admit Date: 1/20/2022 to [address] [place] [address]  Consultants during acute care: Neurology, Nephrology, Cardiology  Nephrology: Dr Melody Foster  Acute care admission date: 1/13 to 1/20/2022   Admit to General Inpatient Hospice: 1/20/2022        Subjective: The patient is resting peacefully now, and was intermittently restless in the prior 24 hours. The medications have been adjusted. I have collaborated with the patient's hospice nurse, and Dr Melody Foster. The patient has not eaten in days and only takes some sips.      Objective:   Pain/sob is managed with Hydromorphone IV prn with 0 doses in the past 24 hours, Glycopyrrolate IV is available for secretions, Seroquel 100 mg every 6 hours scheduled, Haloperidol for delirium with 2 prns, and Lorazepam is available for anxiety/agitation with 1 prns.        Data reviewed 1/21/2022:  Chest X-ray  1/13/22:  Mild right basilar opacity.  Considerations include atelectasis and mild   pneumonia.       Mild cardiac silhouette enlargement.  Mild central vascular congestion.      CTA chest 1/13/22:  1. Suspected small right middle lobe segmental and subsegmental pulmonary   embolus.  Motion significantly degrades the quality of the exam.   2. Small foci of peripheral consolidation in the upper lobes.  Consider mild   COVID pneumonia. 3. Mild bibasilar atelectasis or pneumonia. 4. Splenomegaly.    5. Distended gallbladder.  Mild intra and extrahepatic biliary ductal   dilatation.      CT-scan of the brain 1/13/22:  No acute intracranial hemorrhage, mass effect, midline shift, or   hydrocephalus.  Streak artifact over the frontal lobes but no sign of an   acute territorial infarct.       Volume loss with prominence of the ventricles and sulci are stable.      CTA head and neck 1/13/22:  1.  No large vessel occlusion in the head or neck.       2.  Mild-to-moderate atherosclerotic disease.       3.  Scattered heterogeneous opacities in the visualized lung apices may   relate to an atypical infectious/inflammatory process including atypical or   viral/COVID-19 pneumonia versus mild pulmonary edema.       4.  C4-C5 osseous sclerosis and endplate erosions may relate to advanced   degenerative changes.  Underlying discitis/osteomyelitis is difficult to   entirely exclude and could be further evaluated with follow-up cervical spine   MRI.      Transthoracic Echocardiogram 1/13/22:   Expedited imaging was used to obtain protocol images to reduce the   sonographer's exposure during COVID-19 pandemic.   Left ventricular systolic function is normal.   Ejection fraction is visually estimated at 50-55%.   Moderate aortic regurgitation; PHT: 498 msec.   Mild tricuspid regurgitation; RVSP: 30 mmHg.   No evidence of any pericardial effusion.     Hemoglobin A1C: no recent available            Accuchecks: Recent Labs     01/19/22  0737 01/19/22  1055 01/19/22  1547   POCGLU 108* 102* 139*      Hepatic Function Panel:              Lab Results   Component Value Date     ALKPHOS 79 01/14/2022     ALT 28 01/14/2022     AST 60 01/14/2022     PROT 5.9 01/14/2022     PROT 7.4 08/16/2011     BILITOT 0.3 01/14/2022     BILIDIR 0.2 01/14/2022     IBILI 0.1 01/14/2022     LABALBU 4.1 01/19/2022     LABALBU 3.8 05/19/2021      CBC:             Recent Labs     01/18/22  0926 01/18/22  1615 01/19/22  0126   WBC 7.2 6.7 8.6   HGB 8.9* 8.8* 7.8*   HCT 28.4* 28.9* 25.2*   MCV 89.6 91.2 90.0    176 197      BMP:             Recent Labs     01/17/22  1307 01/18/22  0926 01/19/22  0126    140 140   K 4.9 3.8 4.2    100 101   CO2 23 26 26   BUN 98* 56* 68*   CREATININE 4.6* 3.2* 3.2*   GLUCOSE 143* 95 168*        Physical Exam:   BP (!) 165/74   Pulse 112   Temp 98.8 °F (37.1 °C) (Axillary)   Resp 18 SpO2 95%   General: resting peacefully, no facial grimacing,   HEENT: mucous membranes are dry, mild conjunctival pallor   Heart: distant tones, tachycardic RRR, S1S2, II/VI RESHMA at left sternal border   Lungs:  equal coarse breath sounds bilaterally, diminished at the bases, without rales, rhonchi   Abdomen: soft, bowel sounds quietly present, no apparent tenderness, nondistended  Extremities:  No mottling, left upper extremity AV fistula    Skin: warm and dry, feet cool. Neurologic: nonverbal,      Assessment/Plan:  1. ESRD with toxic/metabolic encephalopathy with delirium. Continue General Inpatient Hospice to manage delirium, pain, dyspnea and follow clinical course.    2. COVID-19 not currently on oxygen, Omicron with fewer pulmonary symptoms but likely contributing to overall decline and decreased intake. PPS 20%, prognosis is poor. 3. ESRD, stopped hemodialysis. The patient has gone for periods of time without hemodialysis in the past  4. Bilateral pulmonary emboli, off anticoagulation due to severe epistaxis  5. Baseline cognitive dysfunction, schizoaffective disorder,  on long term antipsychotics. Tapered zoloft. 6. Epistaxis  7. Cerebrovascular disease  8.  Delirium with agitation already on antipsychotics due to schizoaffective disorder, continue current regimen and monitor.        Patient Active Problem List   Diagnosis Code    Altered mental status R41.82    Essential hypertension I10    Low back pain M54.50    Acquired hypothyroidism E03.9    Panic disorder F41.0    Dystonia G24.9    Chronic kidney disease (CKD) stage G3a/A3, moderately decreased glomerular filtration rate (GFR) between 45-59 mL/min/1.73 square meter and albuminuria creatinine ratio greater than 300 mg/g ([facility]) N18.31    Type 2 diabetes mellitus without complication ([facility]) A57.1    Ambulatory dysfunction R26.2    Non compliance w medication regimen Z91.14    Hyperlipidemia E78.5    Closed fracture of distal end of fibula A39.770H    Acute metabolic encephalopathy C50.68    SO (acute kidney injury) ([facility] [address] N17.9    Toxic metabolic encephalopathy R81.4    Closed displaced fracture of shaft of second metacarpal bone of right hand S62.320A    Moderate episode of recurrent major depressive disorder ([facility]) F33.1    Generalized anxiety disorder F41.1    ESRD (end stage renal disease) ([facility] [address] N18.6    ESRD on hemodialysis ([facility]) N18.6, Z99.2    AVF (arteriovenous fistula) ([facility]) I77.0    Hyperkalemia E87.5    Chronic kidney disease, stage IV (severe) ([facility]) N18.4    Acquired renal cyst N28.1    Allergic rhinitis J30.9    Anemia due to stage 4 chronic kidney disease ([facility]) N18.4, D63.1    Balance disorder R26.89    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    Gastroesophageal reflux disease K21.9    Impaired mobility Z74.09    Insomnia due to other mental disorder F51.05, F99    Iron deficiency E61.1    Memory impairment R41.3    Microalbuminuria due to type 2 diabetes mellitus ([facility]) E11.29, R80.9    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    Schizoaffective disorder ([facility]) F25.9    Severe episode of recurrent major depressive disorder, without psychotic features ([facility] [address] F33.2    Spinal stenosis of cervical region M48.02    Stenosis of both internal carotid arteries I65.23    Visual disturbance H53.9    Vitamin B12 deficiency E53.8    Vitamin D deficiency E55.9    Pulmonary embolism and infarction ([facility]) I26.99    Acute respiratory failure ([facility]) J96.00    Moderate malnutrition ([facility]) E44.0    Hospice care Z51.5       Electronically signed by Padmini Oliver MD on 1/24/2022 at 9:11 AM

## 2022-01-24 NOTE — CARE COORDINATION
LSW was informed by Dr. Joshua Cash that pt may need to return to [address] [place] [address] with Hospice. LSW called Luisciara Delgado with [facility] and left a message. LSW spoke with pt dght regarding what Dr. Joshua Cash had informed this LSW about pt returning to [address] [place] [address]. Pt dght stated she spoke with  Dr. Sujey Anderson and he feel pt should stay here. LSW explained that Dr. Joshua Cash is the one that can approve that and pt has to meet the criteria to say GIP. Pt dght informed this LSW if pt can not stay here they do not want pt to return to [facility]. Pt dght requested to talk with Dr. Joshua Cash. While this LSW was talking with dght Dr. Joshua Cash called her on her phone. LSW will wait to make anymore discharge plans.

## 2022-01-25 PROCEDURE — 1250000000 HC SEMI PRIVATE HOSPICE R&B

## 2022-01-25 PROCEDURE — 2580000003 HC RX 258: Performed by: FAMILY MEDICINE

## 2022-01-25 PROCEDURE — 6360000002 HC RX W HCPCS: Performed by: INTERNAL MEDICINE

## 2022-01-25 PROCEDURE — 6360000002 HC RX W HCPCS: Performed by: FAMILY MEDICINE

## 2022-01-25 PROCEDURE — 94761 N-INVAS EAR/PLS OXIMETRY MLT: CPT

## 2022-01-25 RX ADMIN — HYDROMORPHONE HYDROCHLORIDE 0.5 MG: 2 INJECTION, SOLUTION INTRAMUSCULAR; INTRAVENOUS; SUBCUTANEOUS at 13:25

## 2022-01-25 RX ADMIN — HYDROMORPHONE HYDROCHLORIDE 0.5 MG: 2 INJECTION, SOLUTION INTRAMUSCULAR; INTRAVENOUS; SUBCUTANEOUS at 09:26

## 2022-01-25 RX ADMIN — SODIUM CHLORIDE, PRESERVATIVE FREE 10 ML: 5 INJECTION INTRAVENOUS at 20:44

## 2022-01-25 RX ADMIN — LORAZEPAM 2 MG: 2 INJECTION INTRAMUSCULAR at 23:59

## 2022-01-25 RX ADMIN — HYDROMORPHONE HYDROCHLORIDE 0.5 MG: 2 INJECTION, SOLUTION INTRAMUSCULAR; INTRAVENOUS; SUBCUTANEOUS at 18:43

## 2022-01-25 RX ADMIN — HALOPERIDOL LACTATE 4 MG: 5 INJECTION, SOLUTION INTRAMUSCULAR at 20:44

## 2022-01-25 ASSESSMENT — PAIN SCALES - GENERAL
PAINLEVEL_OUTOF10: 5

## 2022-01-25 ASSESSMENT — PAIN SCALES - WONG BAKER: WONGBAKER_NUMERICALRESPONSE: 0

## 2022-01-25 NOTE — PLAN OF CARE
Problem: Falls - Risk of:  Goal: Will remain free from falls  Description: Will remain free from falls  Outcome: Ongoing  Goal: Absence of physical injury  Description: Absence of physical injury  Outcome: Ongoing     Problem: Pain:  Goal: Pain level will decrease  Description: Pain level will decrease  Outcome: Ongoing  Goal: Control of acute pain  Description: Control of acute pain  Outcome: Ongoing  Goal: Control of chronic pain  Description: Control of chronic pain  Outcome: Ongoing     Problem: Sensory:  Goal: Ability to identify factors that increase the pain will improve  Description: Ability to identify factors that increase the pain will improve  Outcome: Ongoing  Goal: Ability to demonstrate ways to enhance comfort will improve  Description: Ability to demonstrate ways to enhance comfort will improve  Outcome: Ongoing  Goal: General experience of comfort will improve  Description: General experience of comfort will improve  Outcome: Ongoing  Goal: Expressions of feelings of enhanced comfort will increase  Description: Expressions of feelings of enhanced comfort will increase  Outcome: Ongoing     Problem: Coping:  Goal: Ability to participate in care decisions during the dying process will improve  Description: Ability to participate in care decisions during the dying process will improve  Outcome: Ongoing  Goal: Family's ability to cope with current situation will improve  Description: Family's ability to cope with current situation will improve  Outcome: Ongoing     Problem: Health Behavior:  Goal: Identification of resources available to assist in meeting health care needs will improve  Description: Identification of resources available to assist in meeting health care needs will improve  Outcome: Ongoing     Problem: Physical Regulation:  Goal: Complications related to the disease process, condition or treatment will be avoided or minimized  Description: Complications related to the disease process, condition or treatment will be avoided or minimized  Outcome: Ongoing

## 2022-01-25 NOTE — PROGRESS NOTES
Crushed pills and mixed with small amount of fluid, inserted into patients mouth with syringe, per hospice instructions. Pt did not move mouth or tongue in any effort to move around mouth or swallow. Pill/fluid combination dribbled back out of patients mouth.

## 2022-01-25 NOTE — PROGRESS NOTES
[address] [place] [address]  General Inpatient Hospice Progress Note    Date: 1/25/2022  Name: Lalit Ch  MRN: [id number]  YOB: 1940   Patient's PCP: Nuha Arteaga MD  Admit Date: 1/20/2022 to [address] [place] [address]  Consultants during acute care: Neurology, Nephrology, Cardiology  Nephrology: Dr Em Dobbs  Acute care admission date: 1/13 to 1/20/2022   Admit to General Inpatient Hospice: 1/20/2022        Subjective: The patient is resting peacefully this morning. The nurse was unable to administer meds oral/sublingual this morning, and has received IV medications in the past 24 hours. I have collaborated with the patient's hospice nurse. The patient has not eaten in many days and only takes some sips.      Objective:   Pain/sob is managed with Hydromorphone IV prn with 2 doses in the past 24 hours, Glycopyrrolate IV is available for secretions, Seroquel 100 mg every 6 hours scheduled, Haloperidol for delirium with 1 prn, and Lorazepam is available for anxiety/agitation with 1 prns.        Data reviewed 1/21/2022:  Chest X-ray  1/13/22:  Mild right basilar opacity.  Considerations include atelectasis and mild   pneumonia.       Mild cardiac silhouette enlargement.  Mild central vascular congestion.      CTA chest 1/13/22:  1. Suspected small right middle lobe segmental and subsegmental pulmonary   embolus.  Motion significantly degrades the quality of the exam.   2. Small foci of peripheral consolidation in the upper lobes.  Consider mild   COVID pneumonia. 3. Mild bibasilar atelectasis or pneumonia. 4. Splenomegaly.    5. Distended gallbladder.  Mild intra and extrahepatic biliary ductal   dilatation.      CT-scan of the brain 1/13/22:  No acute intracranial hemorrhage, mass effect, midline shift, or   hydrocephalus.  Streak artifact over the frontal lobes but no sign of an   acute territorial infarct.       Volume loss with prominence of the ventricles and sulci are stable.      CTA cough Resp 22   SpO2 92%   General: unresponsive, no facial grimacing,   HEENT: mucous membranes are dry  Heart: distant tones, tachycardic tachycardic RRR, S1S2, II/VI RESHMA at left sternal border   Lungs:  equal coarse breath sounds bilaterally, diminished at the bases, without rales, rhonchi   Abdomen: soft, bowel sounds quiet, no apparent tenderness, nondistended  Extremities:  No mottling, left upper extremity AV fistula    Skin: feet are cool. Neurologic: nonverbal,      Assessment/Plan:  1. ESRD with toxic/metabolic encephalopathy with delirium. Continue General Inpatient Hospice to manage delirium, pain, dyspnea and follow clinical course.    2. COVID-19 not currently on oxygen, Omicron with fewer pulmonary symptoms but likely contributing to overall decline and decreased intake. PPS 20%, prognosis is poor. 3. ESRD, stopped hemodialysis. The patient has gone for periods of time without hemodialysis in the past  4. Bilateral pulmonary emboli, off anticoagulation due to severe epistaxis  5. Baseline cognitive dysfunction, schizoaffective disorder,  on long term antipsychotics. Tapered zoloft. 6. Epistaxis  7. Cerebrovascular disease  8.  Delirium with agitation already on antipsychotics due to schizoaffective disorder, continue current regimen and monitor.        Patient Active Problem List   Diagnosis Code    Altered mental status R41.82    Essential hypertension I10    Low back pain M54.50    Acquired hypothyroidism E03.9    Panic disorder F41.0    Dystonia G24.9    Chronic kidney disease (CKD) stage G3a/A3, moderately decreased glomerular filtration rate (GFR) between 45-59 mL/min/1.73 square meter and albuminuria creatinine ratio greater than 300 mg/g ([facility]) N18.31    Type 2 diabetes mellitus without complication ([facility]) J82.7    Ambulatory dysfunction R26.2    Non compliance w medication regimen Z91.14    Hyperlipidemia E78.5    Closed fracture of distal end of fibula S82.839A    Acute metabolic encephalopathy G93.41    SO (acute kidney injury) ([facility] N17.9    Toxic metabolic encephalopathy W62.4    Closed displaced fracture of shaft of second metacarpal bone of right hand S62.320A    Moderate episode of recurrent major depressive disorder (HCC) F33.1    Generalized anxiety disorder F41.1    ESRD (end stage renal disease) ([facility] N18.6    ESRD on hemodialysis ([facility]) N18.6, Z99.2    AVF (arteriovenous fistula) ([facility]) I77.0    Hyperkalemia E87.5    Chronic kidney disease, stage IV (severe) ([facility]) N18.4    Acquired renal cyst N28.1    Allergic rhinitis J30.9    Anemia due to stage 4 chronic kidney disease ([facility]) N18.4, D63.1    Balance disorder R26.89    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    Gastroesophageal reflux disease K21.9    Impaired mobility Z74.09    Insomnia due to other mental disorder F51.05, F99    Iron deficiency E61.1    Memory impairment R41.3    Microalbuminuria due to type 2 diabetes mellitus ([facility]) E11.29, R80.9    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    Schizoaffective disorder ([facility]) F25.9    Severe episode of recurrent major depressive disorder, without psychotic features ([facility] F33.2    Spinal stenosis of cervical region M48.02    Stenosis of both internal carotid arteries I65.23    Visual disturbance H53.9    Vitamin B12 deficiency E53.8    Vitamin D deficiency E55.9    Pulmonary embolism and infarction (HCC) I26.99    Acute respiratory failure (HCC) J96.00    Moderate malnutrition (HCC) E44.0    Hospice care Z51.5       Electronically signed by Estella Manzanares MD on 1/25/2022 at 10:02 AM

## 2022-01-26 VITALS
SYSTOLIC BLOOD PRESSURE: 140 MMHG | TEMPERATURE: 98.4 F | HEART RATE: 100 BPM | DIASTOLIC BLOOD PRESSURE: 70 MMHG | OXYGEN SATURATION: 96 % | RESPIRATION RATE: 16 BRPM

## 2022-01-26 PROCEDURE — 6370000000 HC RX 637 (ALT 250 FOR IP): Performed by: FAMILY MEDICINE

## 2022-01-26 PROCEDURE — 6370000000 HC RX 637 (ALT 250 FOR IP): Performed by: INTERNAL MEDICINE

## 2022-01-26 PROCEDURE — 2580000003 HC RX 258: Performed by: FAMILY MEDICINE

## 2022-01-26 PROCEDURE — 6360000002 HC RX W HCPCS: Performed by: FAMILY MEDICINE

## 2022-01-26 RX ORDER — QUETIAPINE FUMARATE 25 MG/1
75 TABLET, FILM COATED ORAL
Qty: 60 TABLET | Refills: 3 | DISCHARGE
Start: 2022-01-26

## 2022-01-26 RX ORDER — LORAZEPAM 1 MG/1
1-2 TABLET ORAL EVERY 4 HOURS PRN
Status: SHIPPED | OUTPATIENT
Start: 2022-01-26 | End: 2022-02-25

## 2022-01-26 RX ORDER — ARIPIPRAZOLE 5 MG/1
5 TABLET ORAL DAILY
Qty: 30 TABLET | Refills: 3 | DISCHARGE
Start: 2022-01-26

## 2022-01-26 RX ORDER — QUETIAPINE FUMARATE 100 MG/1
100 TABLET, FILM COATED ORAL EVERY 6 HOURS
Qty: 60 TABLET | Refills: 3 | DISCHARGE
Start: 2022-01-26

## 2022-01-26 RX ORDER — BISACODYL 10 MG
10 SUPPOSITORY, RECTAL RECTAL DAILY PRN
DISCHARGE
Start: 2022-01-26

## 2022-01-26 RX ORDER — HYDROMORPHONE HYDROCHLORIDE 2 MG/1
2 TABLET ORAL
Refills: 0 | Status: SHIPPED | OUTPATIENT
Start: 2022-01-26 | End: 2022-02-25

## 2022-01-26 RX ADMIN — SERTRALINE HYDROCHLORIDE 25 MG: 50 TABLET ORAL at 08:30

## 2022-01-26 RX ADMIN — SODIUM CHLORIDE, PRESERVATIVE FREE 10 ML: 5 INJECTION INTRAVENOUS at 08:31

## 2022-01-26 RX ADMIN — QUETIAPINE FUMARATE 100 MG: 100 TABLET ORAL at 13:35

## 2022-01-26 RX ADMIN — ARIPIPRAZOLE 5 MG: 5 TABLET ORAL at 08:31

## 2022-01-26 RX ADMIN — HYDROMORPHONE HYDROCHLORIDE 0.5 MG: 2 INJECTION, SOLUTION INTRAMUSCULAR; INTRAVENOUS; SUBCUTANEOUS at 08:30

## 2022-01-26 RX ADMIN — METOPROLOL TARTRATE 12.5 MG: 25 TABLET, FILM COATED ORAL at 08:30

## 2022-01-26 ASSESSMENT — PAIN SCALES - GENERAL
PAINLEVEL_OUTOF10: 7
PAINLEVEL_OUTOF10: 0

## 2022-01-26 NOTE — PROGRESS NOTES
[address] [place] [address]  General Inpatient Hospice Progress Note    Date: 1/26/2022  Name: Carlos Muro  MRN: [id number]  YOB: 1940   Patient's PCP: Mani Dumont MD  Admit Date: 1/20/2022 to [address] [place] [address]  Consultants during acute care: Neurology, Nephrology, Cardiology  Nephrology: Dr Rivera Arceo  Acute care admission date: 1/13 to 1/20/2022   Admit to General Inpatient Hospice: 1/20/2022        Subjective: The patient is restless this morning after not receiving Quetiapine x 24 hours due to difficulty swallowing and the nurse was unable to administer meds oral/sublingual. IV Haloperidol has not been as effective. The patient has not eaten in 1 week and only takes some sips. I collaborated with case management and hospice nurse liaison. I talked to the patient's daughter, Ted Lynn by phone at [phone] regarding the above. We discussed moving to the [facility] inpatient unit and using a Keisha catheter for rectal administration of the medications, and Luciana agrees.      Objective:   Pain/sob is managed with Hydromorphone IV prn with 3 doses in the past 24 hours, Glycopyrrolate IV is available for secretions, Seroquel 100 mg every 6 hours scheduled (has not received for over 24 hours), Haloperidol for delirium with 1 prn, and Lorazepam is available for anxiety/agitation with 1 prns.        Data reviewed 1/21/2022:  Chest X-ray  1/13/22:  Mild right basilar opacity.  Considerations include atelectasis and mild   pneumonia.       Mild cardiac silhouette enlargement.  Mild central vascular congestion.      CTA chest 1/13/22:  1. Suspected small right middle lobe segmental and subsegmental pulmonary   embolus.  Motion significantly degrades the quality of the exam.   2. Small foci of peripheral consolidation in the upper lobes.  Consider mild   COVID pneumonia. 3. Mild bibasilar atelectasis or pneumonia. 4. Splenomegaly.    5. Distended gallbladder.  Mild intra and extrahepatic biliary ductal   dilatation.      CT-scan of the brain 1/13/22:  No acute intracranial hemorrhage, mass effect, midline shift, or   hydrocephalus.  Streak artifact over the frontal lobes but no sign of an   acute territorial infarct.       Volume loss with prominence of the ventricles and sulci are stable.      CTA head and neck 1/13/22:  1.  No large vessel occlusion in the head or neck.       2.  Mild-to-moderate atherosclerotic disease.       3.  Scattered heterogeneous opacities in the visualized lung apices may   relate to an atypical infectious/inflammatory process including atypical or   viral/COVID-19 pneumonia versus mild pulmonary edema.       4.  C4-C5 osseous sclerosis and endplate erosions may relate to advanced   degenerative changes.  Underlying discitis/osteomyelitis is difficult to   entirely exclude and could be further evaluated with follow-up cervical spine   MRI.      Transthoracic Echocardiogram 1/13/22:   Expedited imaging was used to obtain protocol images to reduce the   sonographer's exposure during COVID-19 pandemic.   Left ventricular systolic function is normal.   Ejection fraction is visually estimated at 50-55%.   Moderate aortic regurgitation; PHT: 498 msec.   Mild tricuspid regurgitation; RVSP: 30 mmHg.   No evidence of any pericardial effusion.     Hemoglobin A1C: no recent available            Accuchecks: Recent Labs     01/19/22  0737 01/19/22  1055 01/19/22  1547   POCGLU 108* 102* 139*      Hepatic Function Panel:              Lab Results   Component Value Date     ALKPHOS 79 01/14/2022     ALT 28 01/14/2022     AST 60 01/14/2022     PROT 5.9 01/14/2022     PROT 7.4 08/16/2011     BILITOT 0.3 01/14/2022     BILIDIR 0.2 01/14/2022     IBILI 0.1 01/14/2022     LABALBU 4.1 01/19/2022     LABALBU 3.8 05/19/2021      CBC:             Recent Labs     01/18/22  0926 01/18/22  1615 01/19/22  0126   WBC 7.2 6.7 8.6   HGB 8.9* 8.8* 7.8*   HCT 28.4* 28.9* 25.2*   MCV 89.6 91.2 90.0    176 197      BMP:             Recent Labs     01/17/22  1307 01/18/22  0926 01/19/22  0126    140 140   K 4.9 3.8 4.2    100 101   CO2 23 26 26   BUN 98* 56* 68*   CREATININE 4.6* 3.2* 3.2*   GLUCOSE 143* 95 168*        Physical Exam:   BP (!) 144/74   Pulse 106   Temp 98.6 °F (37 °C) (Axillary)   Resp 30   SpO2 96%   General: restless (but not as agitated as last weekend), + facial grimacing,   HEENT: mucous membranes are dry  Heart: distant tones, tachycardic RRR, S1S2, II/VI RESHMA at left sternal border   Lungs:  equal coarse breath sounds bilaterally, diminished at the bases, without rales, rhonchi   Abdomen: soft, bowel sounds quiet, no apparent tenderness, nondistended  Extremities:  No mottling, left upper extremity AV fistula    Skin: feet are cool. Neurologic: restless, mumbles     Assessment/Plan:  1. ESRD with toxic/metabolic encephalopathy with delirium. Continue General Inpatient Hospice to manage delirium, pain, dyspnea. The patient would benefit from [facility] catheter for medications, and will work on details to transfer to [address] [place] [address] inpatient unit.  PPS 20%, prognosis is poor. Will stop less important medications. 2. COVID-19 not currently on oxygen, Omicron with fewer pulmonary symptoms but likely contributing to overall decline and decreased intake. 3. ESRD, stopped hemodialysis. The patient has gone for periods of time without hemodialysis in the past  4. Bilateral pulmonary emboli, off anticoagulation due to severe epistaxis  5. Baseline cognitive dysfunction, schizoaffective disorder,  on long term antipsychotics. Tapered zoloft. 6. Epistaxis  7. Cerebrovascular disease  8.  Delirium with agitation already on antipsychotics due to schizoaffective disorder, continue current regimen and monitor.        Patient Active Problem List   Diagnosis Code    Altered mental status R41.82    Essential hypertension I10    Low back pain M54.50    Acquired hypothyroidism E03.9    Panic disorder F41.0    Dystonia G24.9    Chronic kidney disease (CKD) stage G3a/A3, moderately decreased glomerular filtration rate (GFR) between 45-59 mL/min/1.73 square meter and albuminuria creatinine ratio greater than 300 mg/g ([facility]) N18.31    Type 2 diabetes mellitus without complication ([facility]) O48.7    Ambulatory dysfunction R26.2    Non compliance w medication regimen Z91.14    Hyperlipidemia E78.5    Closed fracture of distal end of fibula S82.839A    Acute metabolic encephalopathy S87.25    SO (acute kidney injury) ([facility] N17.9    Toxic metabolic encephalopathy S83.1    Closed displaced fracture of shaft of second metacarpal bone of right hand S62.320A    Moderate episode of recurrent major depressive disorder ([facility]) F33.1    Generalized anxiety disorder F41.1    ESRD (end stage renal disease) ([facility]) N18.6    ESRD on hemodialysis ([facility]) N18.6, Z99.2    AVF (arteriovenous fistula) ([facility]) I77.0    Hyperkalemia E87.5    Chronic kidney disease, stage IV (severe) ([facility]) N18.4    Acquired renal cyst N28.1    Allergic rhinitis J30.9    Anemia due to stage 4 chronic kidney disease ([facility]) N18.4, D63.1    Balance disorder R26.89    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    Gastroesophageal reflux disease K21.9    Impaired mobility Z74.09    Insomnia due to other mental disorder F51.05, F99    Iron deficiency E61.1    Memory impairment R41.3    Microalbuminuria due to type 2 diabetes mellitus ([facility]) E11.29, R80.9    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    Schizoaffective disorder ([facility]) F25.9    Severe episode of recurrent major depressive disorder, without psychotic features ([facility] F33.2    Spinal stenosis of cervical region M48.02    Stenosis of both internal carotid arteries I65.23    Visual disturbance H53.9    Vitamin B12 deficiency E53.8    Vitamin D deficiency E55.9    Pulmonary embolism and infarction ([facility]) I26.99    Acute respiratory failure ([facility]) J96.00    Moderate malnutrition ([facility]) E44.0    Hospice care Z51.5       Electronically signed by Lissa Ivy MD on 1/26/2022 at 8:44 AM

## 2022-01-26 NOTE — CARE COORDINATION
Dr. Delia Bennett informed this LSW that Hospice RN and SW are working on getting pt transferred to the in hospice in [place]. Family is agreeable.

## 2022-01-26 NOTE — DISCHARGE SUMMARY
[facility]    Patient Demographics    Patient. [address]  Date of Birth. 1940  MRN. [id number]     Primary care provider. Sole Wolfe MD  (Tel: [phone])    Admit date: 1/13/2022    Discharge date (blank if same as Note Date): 1/20/2022  Note Date: 1/26/2022     Reason for Hospitalization. Chief Complaint   Patient presents with    Altered Mental Status     last seen by nursing home staff at 830pm, 70% room air, 80% now on 15 L for EMS, narcan given and awoke a little more          Diagnosis. Principal Problem:    ESRD (end stage renal disease) ([facility]  Active Problems:    Hospice care    Generalized anxiety disorder    Toxic metabolic encephalopathy    Schizoaffective disorder (HCC)    Pulmonary embolism and infarction (HCC)    Acute respiratory failure (HCC)    Moderate malnutrition ([facility]  Resolved Problems:    * No resolved hospital problems. [facility] Course:      patient is a 81-year-old male with history of hypertension, hyperlipidemia, CVA, diabetes mellitus, dementia, hyperlipidemia and end-stage renal disease was admitted with hypoxia and unresponsiveness. Patient was saturating 70s in the ER, had pinpoint pupils improved after Narcan given, CT showed bilateral PE, positive for COVID, heparin infusion started  Patient was admitted to ICU, cardiology and nephrology consulted  Underwent dialysis, switched to apixaban  Oxygen requirement improved     1/17 patient was refusing dialysis, hospice consulted  Patient had epistaxis, refused dialysis, apixaban and aspirin held  Status post Rhino Rocket and Carlos Eduardo-Synephrine locally, hemoglobin remained stable     Hospice consulted  Was discharged to hospice once accepted     Invasive procedures and treatments. 1. None     Consults.   IP CONSULT TO PHARMACY  PHARMACY TO CHANGE BASE FLUIDS  IP CONSULT TO HOSPITALIST  IP CONSULT TO CARDIOLOGY  IP CONSULT TO NEUROLOGY  PHARMACY TO DOSE VANCOMYCIN  IP CONSULT TO HOSPICE  IP CONSULT TO OTOLARYNGOLOGY    Physical examination on discharge day. /61   Pulse 97   Temp 97.7 °F (36.5 °C) (Axillary)   Resp 16   Ht 5' 7\" (1.702 m)   Wt 141 lb 6.4 oz (64.1 kg)   SpO2 94%   BMI 22.15 kg/m²   General appearance. Alert. Looks comfortable. HEENT. Sclera clear. Moist mucus membranes. Cardiovascular. Regular rate and rhythm, normal S1, S2. No murmur. Respiratory. Not using accessory muscles. Clear to auscultation bilaterally, no wheeze. Gastrointestinal. Abdomen soft, non-tender, not distended, normal bowel sounds  Neurology. Facial symmetry. No speech deficits. Moving all extremities equally. Extremities. No edema in lower extremities. Skin. Warm, dry, normal turgor    Condition at time of discharge stable    Medication instructions provided to patient at discharge.      Medication List      ASK your doctor about these medications    albuterol sulfate  (90 Base) MCG/ACT inhaler     ammonium lactate 12 % lotion  Commonly known as: LAC-HYDRIN     aspirin 81 MG chewable tablet  Take 1 tablet by mouth daily     atorvastatin 80 MG tablet  Commonly known as: LIPITOR     busPIRone 5 MG tablet  Commonly known as: BUSPAR  Take 1 tablet by mouth 2 times daily     DAILY BOLA PO     donepezil 10 MG tablet  Commonly known as: ARICEPT     fluticasone 50 MCG/ACT nasal spray  Commonly known as: FLONASE     gabapentin 300 MG capsule  Commonly known as: NEURONTIN     hydrOXYzine 50 MG tablet  Commonly known as: ATARAX  Take 1 tablet by mouth daily as needed for Itching     insulin glargine 100 UNIT/ML injection vial  Commonly known as: LANTUS     Januvia 50 MG tablet  Generic drug: SITagliptin     levothyroxine 100 MCG tablet  Commonly known as: SYNTHROID  Take 1 tablet by mouth Daily     Lexapro 20 MG tablet  Generic drug: escitalopram     Lokelma 10 g Pack oral suspension  Generic drug: sodium zirconium cyclosilicate     loperamide 2 MG capsule  Commonly known as: IMODIUM     magnesium hydroxide 400 MG/5ML suspension  Commonly known as: MILK OF MAGNESIA     melatonin 3 MG Tabs tablet  Take 1 tablet by mouth nightly as needed (sleep)     memantine 5 MG tablet  Commonly known as: NAMENDA  Take 1 tablet by mouth 2 times daily     midodrine 5 MG tablet  Commonly known as: PROAMATINE     morphine 15 MG extended release tablet  Commonly known as: MS CONTIN     NovoLOG 100 UNIT/ML injection vial  Generic drug: insulin aspart     pantoprazole 40 MG tablet  Commonly known as: PROTONIX     QUEtiapine 50 MG tablet  Commonly known as: SEROQUEL  Take 1 tablet by mouth nightly     senna 8.6 MG tablet  Commonly known as: SENOKOT     sertraline 50 MG tablet  Commonly known as: ZOLOFT     traMADol 50 MG tablet  Commonly known as: ULTRAM     traZODone 50 MG tablet  Commonly known as: DESYREL     Tylenol 8 Hour 650 MG extended release tablet  Generic drug: acetaminophen     * vitamin D 50 MCG (2000 UT) Tabs tablet  Commonly known as: CHOLECALCIFEROL     * Vitamin D3 50 MCG (2000 UT) Caps         * This list has 2 medication(s) that are the same as other medications prescribed for you. Read the directions carefully, and ask your doctor or other care provider to review them with you. Discharge recommendations given to patient. Follow Up. PCP in 1 week   Disposition. home  Activity. As tolerated  Diet: No diet orders on file      Spent 23 minutes in discharge process.     Signed:  Mickey Batista MD     1/26/2022 10:53 AM

## 2022-01-26 NOTE — CARE COORDINATION
JUDEW spoke with Billy De La Garza with Hospice and pt is being  by [facility] 4:00 - 4:15 to take pt to Hospice in in [place].

## 2022-01-26 NOTE — DISCHARGE INSTR - COC
Continuity of Care Form    Patient Name: Linda Nieves   :  1940  MRN:  [id number]    Admit date:  2022  Discharge date:  2022    Code Status Order: DNR-CC   Advance Directives:      Admitting Physician:  Claire Melton MD  PCP: Shanique Thorpe MD    Discharging Nurse: [facility] Unit/Room#: 3006/3006-A  Discharging Unit Phone Number: ***    Emergency Contact:   Extended Emergency Contact Information  Primary Emergency Contact: Flynn Res  Address: [address] [place] [address]           Heriberto Joiner [address] [place] [address] of [address] [place] [address] Phone: [phone]  Relation: Child  Secondary Emergency Contact: Durga Madsen  Address: [address] of [address] [place] [address] Phone: [phone]  Relation: Other    Past Surgical History:  Past Surgical History:   Procedure Laterality Date    ANKLE SURGERY Left 2016    O.R.I.F.     BACK SURGERY      Cervical 3    COLONOSCOPY      ENDOSCOPY, COLON, DIAGNOSTIC      EYE SURGERY  2019    Left cataract surgery    FRACTURE SURGERY      Right Hand    HAND SURGERY Right 10/4/2019    HAND OPEN REDUCTION INTERNAL FIXATION RIGHT SECOND METACARPAL performed by Shy Ventura DO at [address] Right     Inguinal Hernia    TONSILLECTOMY         Immunization History:   Immunization History   Administered Date(s) Administered    COVID-19, Pfizer Purple top, DILUTE for use, 12+ yrs, 30mcg/0.3mL dose 2021, 2021    Influenza Virus Vaccine 2003    Pneumococcal Conjugate Vaccine 10/22/2001    Pneumococcal Vaccine 10/22/2001    Tdap (Boostrix, Adacel) 2013       Active Problems:  Patient Active Problem List   Diagnosis Code    Altered mental status R41.82    Essential hypertension I10    Low back pain M54.50    Acquired hypothyroidism E03.9    Panic disorder F41.0    Dystonia G24.9    Chronic kidney disease (CKD) stage G3a/A3, moderately decreased glomerular filtration rate (GFR) between 45-59 mL/min/1.73 square meter and albuminuria creatinine ratio greater than 300 mg/g ([facility]) N18.31    Type 2 diabetes mellitus without complication ([facility]) R65.7    Ambulatory dysfunction R26.2    Non compliance w medication regimen Z91.14    Hyperlipidemia E78.5    Closed fracture of distal end of fibula I98.816B    Acute metabolic encephalopathy P30.72    SO (acute kidney injury) ([facility] Q73.0    Toxic metabolic encephalopathy G29.3    Closed displaced fracture of shaft of second metacarpal bone of right hand S62.320A    Moderate episode of recurrent major depressive disorder ([facility]) F33.1    Generalized anxiety disorder F41.1    ESRD (end stage renal disease) ([facility]) N18.6    ESRD on hemodialysis ([facility]) N18.6, Z99.2    AVF (arteriovenous fistula) ([facility]) I77.0    Hyperkalemia E87.5    Chronic kidney disease, stage IV (severe) ([facility]) N18.4    Acquired renal cyst N28.1    Allergic rhinitis J30.9    Anemia due to stage 4 chronic kidney disease ([facility]) N18.4, D63.1    Balance disorder R26.89    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    Gastroesophageal reflux disease K21.9    Impaired mobility Z74.09    Insomnia due to other mental disorder F51.05, F99    Iron deficiency E61.1    Memory impairment R41.3    Microalbuminuria due to type 2 diabetes mellitus ([facility]) E11.29, R80.9    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    Schizoaffective disorder ([facility]) F25.9    Severe episode of recurrent major depressive disorder, without psychotic features ([facility] F33.2    Spinal stenosis of cervical region M48.02    Stenosis of both internal carotid arteries I65.23    Visual disturbance H53.9    Vitamin B12 deficiency E53.8    Vitamin D deficiency E55.9    Pulmonary embolism and infarction ([facility]) I26.99    Acute respiratory failure ([facility]) J96.00    Moderate malnutrition ([facility]) E44.0    Hospice care Z51.5       Isolation/Infection:   Isolation            Droplet Plus          Patient Infection Status       Infection Onset Added Last Indicated Last Indicated By Review Planned Expiration Resolved Resolved By    COVID-19 01/13/22 01/13/22 01/13/22 Respiratory Panel, Molecular, with COVID-19 (Restricted: peds pts or suitable admitted adults) 01/20/22 01/27/22      Resolved    COVID-19 (Rule Out) 01/13/22 01/13/22 01/13/22 Respiratory Panel, Molecular, with COVID-19 (Restricted: peds pts or suitable admitted adults) (Ordered)   01/13/22 Rule-Out Test Resulted    COVID-19 (Rule Out) 05/07/20 05/07/20 05/07/20 COVID-19 (Ordered)   05/07/20 Rule-Out Test Resulted            Nurse Assessment:  Last Vital Signs: BP (!) 144/74   Pulse 106   Temp 98.6 °F (37 °C) (Axillary)   Resp 30   SpO2 96%     Last documented pain score (0-10 scale): Pain Level: 0  Last Weight:   Wt Readings from Last 1 Encounters:   01/20/22 141 lb 6.4 oz (64.1 kg)     Mental Status:  disoriented    IV Access:  - Peripheral IV - site  R Antecubital, insertion date: 1/20/2022    Nursing Mobility/ADLs:  Walking   Dependent  Transfer  Dependent  Bathing  Dependent  Dressing  Dependent  Toileting  Dependent  Feeding  Dependent  Med Admin  Dependent  Med Delivery   crushed and prefers mixed with applesauce or pudding    Wound Care Documentation and Therapy:        Elimination:  Continence: Bowel: {YES / CO:62890}  Bladder: {YES / DK:96306}  Urinary Catheter:  None   Colostomy/Ileostomy/Ileal Conduit: Yes       Date of Last BM: ***  No intake or output data in the 24 hours ending 01/26/22 1211  No intake/output data recorded. Impairments/Disabilities:      Speech    Nutrition Therapy:  Current Nutrition Therapy:   - Oral Diet:  Dysphagia 1 pureed    Routes of Feeding: Oral  Liquids: Thin Liquids  Daily Fluid Restriction: no  Last Modified Barium Swallow with Video (Video Swallowing Test): not done    Treatments at the Time of Hospital Discharge:   Respiratory Treatments: ***  Oxygen Therapy:  is not on home oxygen therapy.   Ventilator:    - No ventilator support    Rehab Therapies: pt Hospice    Weight Bearing Status/Restrictions: No weight bearing restirctions  Other Medical Equipment (for information only, NOT a DME order):  hospital bed  Other Treatments: Hospice    Patient's personal belongings (please select all that are sent with patient):  None    RN SIGNATURE:  Marti Estrella RN    CASE MANAGEMENT/SOCIAL WORK SECTION    Inpatient Status Date: ***    Readmission Risk Assessment Score:  Readmission Risk              Risk of Unplanned Readmission:  20           Discharging to Facility/ Agency   Name:   Address:  Phone:  Fax:    Dialysis Facility (if applicable)   Name:  Address:  Dialysis Schedule:  Phone:  Fax:    / signature: {Esignature:[id number]}    PHYSICIAN SECTION

## 2022-01-26 NOTE — DISCHARGE SUMMARY
[facility] Discharge Summary    Date: 1/26/2022  Name: Farhana Rangel  MRN: [id number]  YOB: 1940     Patient's PCP: Katy Mata MD   Consultants during acute care: Neurology, Nephrology, Cardiology  Nephrology: Dr Montel Kehr  Acute care admission date: 1/13 to 1/20/2022   Date of Admission: 1/20/2022 to [address] [place] [address]  Date of Discharge: 1/26/2022  (Disposition: Hospice [facility], [facility] inpatient unit)  Admitting Physician: Meaghan Canada MD to [address] [place] [address]  Discharge Physician: Bertram Harry MD  Consultation: none  Disposition: [facility] inpatient unit   Advanced directives: DNR-comfort care    Invasive procedures: none during General Inpatient Hospice    Discharge Diagnoses:   1. ESRD with toxic/metabolic encephalopathy with delirium. Continue General Inpatient Hospice to manage delirium, pain, dyspnea. The patient would benefit from [facility] catheter for medications, and will work on details to transfer to [address] [place] [address] inpatient unit.  PPS 20%, prognosis is poor. Will stop less important medications. 2. COVID-19 swab positive on 1/13/22, not currently on oxygen, Omicron with fewer pulmonary symptoms but likely contributing to overall decline and decreased intake. 3. ESRD, stopped hemodialysis. The patient has gone for periods of time without hemodialysis in the past  4. Bilateral pulmonary emboli, off anticoagulation due to severe epistaxis  5. Baseline cognitive dysfunction, schizoaffective disorder,  on long term antipsychotics  6. Epistaxis, resolved  7. Cerebrovascular disease  8. Delirium with agitation already on antipsychotics due to schizoaffective disorder, continue current regimen and monitor.        Patient Active Problem List   Diagnosis Code    Altered mental status R41.82    Essential hypertension I10    Low back pain M54.50    Acquired hypothyroidism E03.9    Panic disorder F41.0    Dystonia G24.9    Chronic kidney disease (CKD) stage G3a/A3, moderately decreased glomerular filtration rate (GFR) between 45-59 mL/min/1.73 square meter and albuminuria creatinine ratio greater than 300 mg/g ([facility]) N18.31    Type 2 diabetes mellitus without complication ([facility]) H47.9    Ambulatory dysfunction R26.2    Non compliance w medication regimen Z91.14    Hyperlipidemia E78.5    Closed fracture of distal end of fibula S82.839A    Acute metabolic encephalopathy W61.43    SO (acute kidney injury) ([facility] N17.9    Toxic metabolic encephalopathy S71.3    Closed displaced fracture of shaft of second metacarpal bone of right hand S62.320A    Moderate episode of recurrent major depressive disorder ([facility]) F33.1    Generalized anxiety disorder F41.1    ESRD (end stage renal disease) ([facility]) N18.6    ESRD on hemodialysis ([facility]) N18.6, Z99.2    AVF (arteriovenous fistula) ([facility]) I77.0    Hyperkalemia E87.5    Chronic kidney disease, stage IV (severe) ([facility]) N18.4    Acquired renal cyst N28.1    Allergic rhinitis J30.9    Anemia due to stage 4 chronic kidney disease ([facility]) N18.4, D63.1    Balance disorder R26.89    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    Gastroesophageal reflux disease K21.9    Impaired mobility Z74.09    Insomnia due to other mental disorder F51.05, F99    Iron deficiency E61.1    Memory impairment R41.3    Microalbuminuria due to type 2 diabetes mellitus ([facility]) E11.29, R80.9    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    Schizoaffective disorder ([facility]) F25.9    Severe episode of recurrent major depressive disorder, without psychotic features ([facility] F33.2    Spinal stenosis of cervical region M48.02    Stenosis of both internal carotid arteries I65.23    Visual disturbance H53.9    Vitamin B12 deficiency E53.8    Vitamin D deficiency E55.9    Pulmonary embolism and infarction ([facility]) I26.99    Acute respiratory failure ([facility]) J96.00    Moderate malnutrition ([facility]) E44.0    Hospice care Z51.5       Brief History, reason for admission: Please see the acute care H+P, discharge summary, consultants notes, and my notes. The patient was admitted to [facility]. This is a 80 y.o. male with history of history of cognitive dysfunction, schizoaffective disorder, hypertension, hyperlipidemia, CVA, diabetes mellitus, hyperlipidemia and end-stage renal disease was admitted on 1/13/2022 from [facility] with hypoxia and unresponsiveness. Patient was saturating in the 70s in the ER, and neuroimaging did not show acute infarct. The patient was found to be COVID-19 positive on 1/13/22, CT chest showed bilateral pulmonary emboli. The patient was anticoagulated with Heparin then Apixiban, now stopped after epistaxis requiring Rhinorocket (now removed and off anticoagulation). The patient has received Dexamethasone and Levofloxacin.        The patient had been off hemodialysis for a period of time prior to this admission. He has had hemodialysis this admission and has told Dr Mallory Dye that he does not want additional dialysis and wants to be comfortable and is ready to die. The patient's daughter, Brady Bauman, is traveling from [place] on 1/20/22.        The patient mumbles, and is not able to provide information to me. He is mildly restless but not combative. The patient is listed as limited code without CPR, meds, intubation or cardioversion.        I have collaborated with the hospice nurse, and the patient's daughter arrived from [place]. Luciana requests comfort focused care, no additional hemodialysis, and is agreeable to General Inpatient Hospice, which is needed due to agitated delirium, and restlessness. I placed discharge readmit orders 1/20/22.       Hospital Course:  The patient was admitted on 1/20/2022  to [facility]  with the above, for complete details, please see the History and Physicial. The patient had previously expressed his wish to discontinue hemodialysis to his daughter, and his Nephrology. They requested comfort focused care. The patient was treated with continued Abilify and Seroquel with prn Haloperidol, Hydromorphone for pain. Over the weekend, the patient was delirious, and did not seem to respond to higher doses of Haloperidol. Dr. Veronica Graham increased the dose of Quetiapine which was beneficial, and the patient became more calm. Unfortunately, it became hard to give the patient his comfort medications orally or sublingually, and the meds were held. The patient became more delirious and restless. At [facility], we do not have the option of using rectal dosing. This was discussed with the patient's daughter, Ilda Robins, and she was agreeable to transfer to the [address] [facility] inpatient unit for management of agitated delirium. I will continue the same dosing of Quetiapine (100 mg every 6 hours plus prn) which was previously beneficial. Hydromorphone and Lorazepam are also available. COVID-19 swab positive on 1/13/22, not currently on oxygen and no respiratory symptoms. I spent 50 minutes in preparation for discharge, coordination with the patient's daughter and the hospice nurse. Significant Diagnostic Studies:  See computerized record in Epic  Chest X-ray  1/13/22:  Mild right basilar opacity.  Considerations include atelectasis and mild   pneumonia.       Mild cardiac silhouette enlargement.  Mild central vascular congestion.      CTA chest 1/13/22:  1. Suspected small right middle lobe segmental and subsegmental pulmonary   embolus.  Motion significantly degrades the quality of the exam.   2. Small foci of peripheral consolidation in the upper lobes.  Consider mild   COVID pneumonia. 3. Mild bibasilar atelectasis or pneumonia. 4. Splenomegaly.    5. Distended gallbladder.  Mild intra and extrahepatic biliary ductal   dilatation.      CT-scan of the brain 1/13/22:  No acute intracranial hemorrhage, mass effect, midline shift, or   hydrocephalus.  Streak artifact over the frontal lobes but no sign of an   acute territorial infarct.       Volume loss with prominence of the ventricles and sulci are stable.      CTA head and neck 1/13/22:  1.  No large vessel occlusion in the head or neck.       2.  Mild-to-moderate atherosclerotic disease.       3.  Scattered heterogeneous opacities in the visualized lung apices may   relate to an atypical infectious/inflammatory process including atypical or   viral/COVID-19 pneumonia versus mild pulmonary edema.       4.  C4-C5 osseous sclerosis and endplate erosions may relate to advanced   degenerative changes.  Underlying discitis/osteomyelitis is difficult to   entirely exclude and could be further evaluated with follow-up cervical spine   MRI.      Transthoracic Echocardiogram 1/13/22:   Expedited imaging was used to obtain protocol images to reduce the   sonographer's exposure during COVID-19 pandemic.   Left ventricular systolic function is normal.   Ejection fraction is visually estimated at 50-55%.   Moderate aortic regurgitation; PHT: 498 msec.   Mild tricuspid regurgitation; RVSP: 30 mmHg.   No evidence of any pericardial effusion.     Hemoglobin A1C: no recent available  Accuchecks:         Recent Labs     01/19/22  1547 01/19/22  1950 01/20/22  0611   POCGLU 139* 155* 111*      Hepatic Function Panel:          Lab Results   Component Value Date     ALKPHOS 79 01/14/2022     ALT 28 01/14/2022     AST 60 01/14/2022     PROT 5.9 01/14/2022     PROT 7.4 08/16/2011     BILITOT 0.3 01/14/2022     BILIDIR 0.2 01/14/2022     IBILI 0.1 01/14/2022     LABALBU 4.1 01/19/2022     LABALBU 3.8 05/19/2021      CBC:         Recent Labs     01/18/22  1615 01/19/22  0126 01/19/22  1631   WBC 6.7 8.6 11.0*   HGB 8.8* 7.8* 8.5*   HCT 28.9* 25.2* 27.4*   MCV 91.2 90.0 89.3    197 227      BMP:        Recent Labs     01/18/22  0926 01/19/22  0126    140   K 3.8 4.2    101   CO2 26 26   BUN 56* 68*   CREATININE 3.2* 3.2*   GLUCOSE 95 168*         Physical Exam:   /71   Pulse 87   Temp 97.9 °F (36.6 °C) (Axillary)   Resp 15   Ht 5' 7\" (1.702 m)   Wt 141 lb 6.4 oz (64.1 kg)   SpO2 90%   BMI 22.15 kg/m²   General: restless, delirious,    HEENT: Right nasal packing in place with dried blood, mucous membranes are dry, mild conjunctival pallor   Heart: distant tones, RRR, S1S2, no murmurs  Lungs:  equal breath sounds bilaterally, diminished at the bases, without rales, rhonchi   Abdomen: soft, bowel sounds present, no apparent tenderness, nondistended  Extremities:  No mottling, left AV fistula,   Neurologic: restless, mumbles     Continuity of care:  Prognosis: Poor  Condition at Discharge: Terminal  Rehab Potential (if transferring to Rehab): Poor  Recommended Labs or Other Treatments After Discharge:   1. no need for labs  2. May use Whiting catheter and give comfort medications rectally if not alert enough to safely swallow  3. May continue saline lock as long as patent, and if infiltrates may leave it out  Physician Certification: I certify the above information and transfer of Carolann Collado  is necessary for the continuing treatment of the diagnosis listed and that he requires Hospice for greater 30 days. Update Admission H&P: Changes in H&P as follows - see discharge summary  PHYSICIAN SIGNATURE:  Electronically signed by Paulette Rodriguez MD on 1/26/22 at 12:14 PM EST    Discharge medications   Current Discharge Medication List           Details   bisacodyl (DULCOLAX) 10 MG suppository Place 1 suppository rectally daily as needed for Constipation      HYDROmorphone (DILAUDID) 2 MG tablet Take 1 tablet by mouth every 3 hours as needed for Pain (or sob) for up to 30 days.  (may administer rectally by [facility]IN catheter if not alert enough to safely swallow)  Refills: 0    Comments: Reduce doses taken as pain becomes manageable  Associated Diagnoses:

## 2022-01-26 NOTE — FLOWSHEET NOTE
PT pulled up in bed, repositioned. Attempted to feed pt lunch, took 2 bites of mashed potato's and refused any more food. Stated he was done and no more.

## 2023-05-02 NOTE — PROGRESS NOTES
Patient seen and examined this morning full note to follow    Patient well-known to me and I discussed with him and his daughter about CODE STATUS and care. Patient was to be a limited code no CPR or resuscitation if his heart stops. For now we agreed to maintain with dialysis but he is unsure if that something he wants to maintain long-term either. Daughter is aware of all this and we discussed this in detail. Hydroxyzine Counseling: Patient advised that the medication is sedating and not to drive a car after taking this medication.  Patient informed of potential adverse effects including but not limited to dry mouth, urinary retention, and blurry vision.  The patient verbalized understanding of the proper use and possible adverse effects of hydroxyzine.  All of the patient's questions and concerns were addressed.

## (undated) DEVICE — BANDAGE COMPR W3INXL5YD WHT BGE POLY COT M E WRP WV HK AND

## (undated) DEVICE — BANDAGE,ELASTIC,ESMARK,STERILE,4"X9',LF: Brand: MEDLINE

## (undated) DEVICE — SYRINGE IRRIG 60ML SFT PLIABLE BLB EZ TO GRP 1 HND USE W/

## (undated) DEVICE — GAUZE,SPONGE,4"X4",16PLY,XRAY,STRL,LF: Brand: MEDLINE

## (undated) DEVICE — SPONGE GZ W4XL8IN COT WVN 12 PLY

## (undated) DEVICE — MARKER SURG SKIN UTIL REGULAR/FINE 2 TIP W/ RUL AND 9 LBL

## (undated) DEVICE — SURGICAL INSTRUMENT OR ACCESSORY FOR ORTHOPEDIC BONE FIXATION: Brand: DRILL BIT KIT, 2.4MM

## (undated) DEVICE — GLOVE SURG SZ 8 L12IN THK75MIL DK GRN LTX FREE

## (undated) DEVICE — SUTURE VCRL SZ 2-0 L18IN ABSRB UD CT-1 L36MM 1/2 CIR J839D

## (undated) DEVICE — DRAPE SURGICAL HAND PROX AURORA

## (undated) DEVICE — TOWEL,OR,DSP,ST,BLUE,STD,6/PK,12PK/CS: Brand: MEDLINE

## (undated) DEVICE — PADDING,UNDERCAST,COTTON, 4"X4YD STERILE: Brand: MEDLINE

## (undated) DEVICE — PACK,BASIC,IX: Brand: MEDLINE

## (undated) DEVICE — DRESSING,GAUZE,XEROFORM,CURAD,1"X8",ST: Brand: CURAD

## (undated) DEVICE — SURGICAL INSTRUMENT OR ACCESSORY FOR ORTHOPEDIC BONE FIXATION: Brand: H&F PLATE TRIALS, 1.0MM & 1.5MM

## (undated) DEVICE — SNAP KOVER: Brand: UNBRANDED

## (undated) DEVICE — TUBING, SUCTION, 9/32" X 10', STRAIGHT: Brand: MEDLINE

## (undated) DEVICE — SUTURE VCRL SZ 2-0 L27IN ABSRB UD L26MM SH 1/2 CIR J417H

## (undated) DEVICE — LINER,SEMI-RIGID,3000CC,50EA/CS: Brand: MEDLINE

## (undated) DEVICE — DRAPE SHEET ULTRAGARD: Brand: MEDLINE

## (undated) DEVICE — SURGICAL INSTRUMENT OR ACCESSORY FOR ORTHOPEDIC BONE FIXATION: Brand: K-WIRE KIT, HAND, SMALL

## (undated) DEVICE — INTENDED FOR TISSUE SEPARATION, AND OTHER PROCEDURES THAT REQUIRE A SHARP SURGICAL BLADE TO PUNCTURE OR CUT.: Brand: BARD-PARKER ® STAINLESS STEEL BLADES

## (undated) DEVICE — YANKAUER,FLEXIBLE HANDLE,REGLR CAPACITY: Brand: MEDLINE INDUSTRIES, INC.

## (undated) DEVICE — PENCIL ES CRD L10FT HND SWCHING ROCK SWCH W/ EDGE COAT BLDE

## (undated) DEVICE — GLOVE ORANGE PI 7 1/2   MSG9075

## (undated) DEVICE — LINER SUCT CANSTR 1500CC SEMI RIG W/ POR HYDROPHOBIC SHUT

## (undated) DEVICE — ELECTRODE ES AD CRDLSS PT RET REM POLYHESIVE

## (undated) DEVICE — DRAPE,U/ SHT,SPLIT,PLAS,STERIL: Brand: MEDLINE

## (undated) DEVICE — SOLUTION IV IRRIG WATER 1000ML POUR BRL 2F7114

## (undated) DEVICE — ANESTHESIA CIRCUIT ADULT-LF: Brand: MEDLINE INDUSTRIES, INC.

## (undated) DEVICE — SURGICAL INSTRUMENT OR ACCESSORY FOR ORTHOPEDIC BONE FIXATION: Brand: 2.5MM DRILL BIT, AO, FOR 2.4MM SCREWS

## (undated) DEVICE — PADDING,UNDERCAST,COTTON, 3X4YD STERILE: Brand: MEDLINE